# Patient Record
Sex: FEMALE | Race: WHITE | NOT HISPANIC OR LATINO | Employment: OTHER | URBAN - METROPOLITAN AREA
[De-identification: names, ages, dates, MRNs, and addresses within clinical notes are randomized per-mention and may not be internally consistent; named-entity substitution may affect disease eponyms.]

---

## 2019-01-16 ENCOUNTER — HOSPITAL ENCOUNTER (EMERGENCY)
Facility: HOSPITAL | Age: 24
Discharge: HOME | End: 2019-01-17
Attending: EMERGENCY MEDICINE | Admitting: EMERGENCY MEDICINE

## 2019-01-16 DIAGNOSIS — R10.31 RIGHT LOWER QUADRANT ABDOMINAL PAIN: Primary | ICD-10-CM

## 2019-01-16 LAB
ALBUMIN SERPL-MCNC: 4.4 G/DL (ref 3.5–5.2)
ALBUMIN/GLOB SERPL: 1.4 G/DL
ALP SERPL-CCNC: 56 U/L (ref 39–117)
ALT SERPL W P-5'-P-CCNC: 13 U/L (ref 1–33)
ANION GAP SERPL CALCULATED.3IONS-SCNC: 11.7 MMOL/L
AST SERPL-CCNC: 13 U/L (ref 1–32)
BASOPHILS # BLD AUTO: 0.02 10*3/MM3 (ref 0–0.2)
BASOPHILS NFR BLD AUTO: 0.3 % (ref 0–1.5)
BILIRUB SERPL-MCNC: 0.4 MG/DL (ref 0.1–1.2)
BUN BLD-MCNC: 12 MG/DL (ref 6–20)
BUN/CREAT SERPL: 16.7 (ref 7–25)
CALCIUM SPEC-SCNC: 9.7 MG/DL (ref 8.6–10.5)
CHLORIDE SERPL-SCNC: 102 MMOL/L (ref 98–107)
CO2 SERPL-SCNC: 27.3 MMOL/L (ref 22–29)
CREAT BLD-MCNC: 0.72 MG/DL (ref 0.57–1)
DEPRECATED RDW RBC AUTO: 47.3 FL (ref 37–54)
EOSINOPHIL # BLD AUTO: 0.19 10*3/MM3 (ref 0–0.7)
EOSINOPHIL NFR BLD AUTO: 2.5 % (ref 0.3–6.2)
ERYTHROCYTE [DISTWIDTH] IN BLOOD BY AUTOMATED COUNT: 13.1 % (ref 11.7–13)
GFR SERPL CREATININE-BSD FRML MDRD: 101 ML/MIN/1.73
GLOBULIN UR ELPH-MCNC: 3.1 GM/DL
GLUCOSE BLD-MCNC: 111 MG/DL (ref 65–99)
HCG SERPL QL: NEGATIVE
HCT VFR BLD AUTO: 38.6 % (ref 35.6–45.5)
HGB BLD-MCNC: 12.4 G/DL (ref 11.9–15.5)
IMM GRANULOCYTES # BLD AUTO: 0.02 10*3/MM3 (ref 0–0.03)
IMM GRANULOCYTES NFR BLD AUTO: 0.3 % (ref 0–0.5)
LIPASE SERPL-CCNC: 21 U/L (ref 13–60)
LYMPHOCYTES # BLD AUTO: 2.91 10*3/MM3 (ref 0.9–4.8)
LYMPHOCYTES NFR BLD AUTO: 38.4 % (ref 19.6–45.3)
MCH RBC QN AUTO: 31.5 PG (ref 26.9–32)
MCHC RBC AUTO-ENTMCNC: 32.1 G/DL (ref 32.4–36.3)
MCV RBC AUTO: 98 FL (ref 80.5–98.2)
MONOCYTES # BLD AUTO: 0.6 10*3/MM3 (ref 0.2–1.2)
MONOCYTES NFR BLD AUTO: 7.9 % (ref 5–12)
NEUTROPHILS # BLD AUTO: 3.83 10*3/MM3 (ref 1.9–8.1)
NEUTROPHILS NFR BLD AUTO: 50.6 % (ref 42.7–76)
NRBC BLD AUTO-RTO: 0 /100 WBC (ref 0–0)
PLATELET # BLD AUTO: 199 10*3/MM3 (ref 140–500)
PMV BLD AUTO: 10 FL (ref 6–12)
POTASSIUM BLD-SCNC: 3.5 MMOL/L (ref 3.5–5.2)
PROT SERPL-MCNC: 7.5 G/DL (ref 6–8.5)
RBC # BLD AUTO: 3.94 10*6/MM3 (ref 3.9–5.2)
SODIUM BLD-SCNC: 141 MMOL/L (ref 136–145)
WBC NRBC COR # BLD: 7.57 10*3/MM3 (ref 4.5–10.7)

## 2019-01-16 PROCEDURE — 96374 THER/PROPH/DIAG INJ IV PUSH: CPT

## 2019-01-16 PROCEDURE — 84703 CHORIONIC GONADOTROPIN ASSAY: CPT | Performed by: NURSE PRACTITIONER

## 2019-01-16 PROCEDURE — 85025 COMPLETE CBC W/AUTO DIFF WBC: CPT | Performed by: NURSE PRACTITIONER

## 2019-01-16 PROCEDURE — 25010000002 ONDANSETRON PER 1 MG: Performed by: PHYSICIAN ASSISTANT

## 2019-01-16 PROCEDURE — 96361 HYDRATE IV INFUSION ADD-ON: CPT

## 2019-01-16 PROCEDURE — 80053 COMPREHEN METABOLIC PANEL: CPT | Performed by: NURSE PRACTITIONER

## 2019-01-16 PROCEDURE — 83690 ASSAY OF LIPASE: CPT | Performed by: PHYSICIAN ASSISTANT

## 2019-01-16 PROCEDURE — 99284 EMERGENCY DEPT VISIT MOD MDM: CPT

## 2019-01-16 RX ORDER — ONDANSETRON 2 MG/ML
4 INJECTION INTRAMUSCULAR; INTRAVENOUS ONCE
Status: COMPLETED | OUTPATIENT
Start: 2019-01-16 | End: 2019-01-16

## 2019-01-16 RX ORDER — SODIUM CHLORIDE 0.9 % (FLUSH) 0.9 %
10 SYRINGE (ML) INJECTION AS NEEDED
Status: DISCONTINUED | OUTPATIENT
Start: 2019-01-16 | End: 2019-01-17 | Stop reason: HOSPADM

## 2019-01-16 RX ADMIN — SODIUM CHLORIDE 1000 ML: 9 INJECTION, SOLUTION INTRAVENOUS at 23:14

## 2019-01-16 RX ADMIN — ONDANSETRON 4 MG: 2 INJECTION INTRAMUSCULAR; INTRAVENOUS at 23:17

## 2019-01-17 ENCOUNTER — APPOINTMENT (OUTPATIENT)
Dept: ULTRASOUND IMAGING | Facility: HOSPITAL | Age: 24
End: 2019-01-17

## 2019-01-17 ENCOUNTER — APPOINTMENT (OUTPATIENT)
Dept: CT IMAGING | Facility: HOSPITAL | Age: 24
End: 2019-01-17

## 2019-01-17 VITALS
BODY MASS INDEX: 18.49 KG/M2 | WEIGHT: 111 LBS | DIASTOLIC BLOOD PRESSURE: 72 MMHG | RESPIRATION RATE: 18 BRPM | HEART RATE: 88 BPM | OXYGEN SATURATION: 99 % | SYSTOLIC BLOOD PRESSURE: 127 MMHG | TEMPERATURE: 98.3 F | HEIGHT: 65 IN

## 2019-01-17 LAB
BILIRUB UR QL STRIP: NEGATIVE
CLARITY UR: CLEAR
COLOR UR: YELLOW
GLUCOSE UR STRIP-MCNC: NEGATIVE MG/DL
HGB UR QL STRIP.AUTO: NEGATIVE
KETONES UR QL STRIP: NEGATIVE
LEUKOCYTE ESTERASE UR QL STRIP.AUTO: NEGATIVE
NITRITE UR QL STRIP: NEGATIVE
PH UR STRIP.AUTO: 7.5 [PH] (ref 5–8)
PROT UR QL STRIP: NEGATIVE
SP GR UR STRIP: <1.005 (ref 1–1.03)
UROBILINOGEN UR QL STRIP: ABNORMAL

## 2019-01-17 PROCEDURE — 93976 VASCULAR STUDY: CPT

## 2019-01-17 PROCEDURE — 25010000002 IOPAMIDOL 61 % SOLUTION: Performed by: PHYSICIAN ASSISTANT

## 2019-01-17 PROCEDURE — 81003 URINALYSIS AUTO W/O SCOPE: CPT | Performed by: NURSE PRACTITIONER

## 2019-01-17 PROCEDURE — 76830 TRANSVAGINAL US NON-OB: CPT

## 2019-01-17 PROCEDURE — 74177 CT ABD & PELVIS W/CONTRAST: CPT

## 2019-01-17 RX ORDER — ONDANSETRON 4 MG/1
4 TABLET, ORALLY DISINTEGRATING ORAL EVERY 8 HOURS PRN
Qty: 12 TABLET | Refills: 0 | OUTPATIENT
Start: 2019-01-17 | End: 2019-12-31

## 2019-01-17 RX ADMIN — IOPAMIDOL 85 ML: 612 INJECTION, SOLUTION INTRAVENOUS at 00:35

## 2019-01-17 NOTE — ED PROVIDER NOTES
EMERGENCY DEPARTMENT ENCOUNTER    CHIEF COMPLAINT  Chief Complaint: Abdominal pain  History given by: patient   History limited by: n/a  Room Number: 26/26  PMD: System, Provider Not In      HPI:  Pt is a 23 y.o. female who presents complaining of abd pain that have been ongoing for the past 2 days. Pt states that initially, the pain was generalized, but it has since localized to the RLQ. Pt states that the pain is exacerbated by movement. She also reports nausea, but denies vomiting, fever, chills, vaginal discharge, or urinary sx. Pt states that she was seen at an [facility] [place], [place] and referred to the ED for further evaluation.    Duration:  2 days   Onset: gradual  Timing: constant   Location: RLQ  Radiation: none  Quality: pain  Intensity/Severity: moderate  Progression: unchanged   Associated Symptoms: nausea  Aggravating Factors: movement   Alleviating Factors: none    PAST MEDICAL HISTORY  Active Ambulatory Problems     Diagnosis Date Noted   • No Active Ambulatory Problems     Resolved Ambulatory Problems     Diagnosis Date Noted   • No Resolved Ambulatory Problems     Past Medical History:   Diagnosis Date   • Gastric polyp    • GERD (gastroesophageal reflux disease)        PAST SURGICAL HISTORY  Past Surgical History:   Procedure Laterality Date   • ENDOSCOPY  02/10/2015    gastric polyp       FAMILY HISTORY  History reviewed. No pertinent family history.    SOCIAL HISTORY  Social History     Socioeconomic History   • Marital status: Single     Spouse name: Not on file   • Number of children: Not on file   • Years of education: Not on file   • Highest education level: Not on file   Social Needs   • Financial resource strain: Not on file   • Food insecurity - worry: Not on file   • Food insecurity - inability: Not on file   • Transportation needs - medical: Not on file   • Transportation needs - non-medical: Not on file   Occupational History   • Not on file   Tobacco Use   • Smoking status: Never Smoker   • Smokeless  tobacco: Never Used   Substance and Sexual Activity   • Alcohol use: No   • Drug use: No   • Sexual activity: Defer   Other Topics Concern   • Not on file   Social History Narrative   • Not on file       ALLERGIES  Patient has no known allergies.    REVIEW OF SYSTEMS  Review of Systems   Constitutional: Negative for fever.   HENT: Negative for sore throat.    Eyes: Negative.    Respiratory: Negative for cough and shortness of breath.    Cardiovascular: Negative for chest pain.   Gastrointestinal: Positive for abdominal pain and nausea. Negative for diarrhea and vomiting.   Genitourinary: Negative for dysuria.   Musculoskeletal: Negative for neck pain.   Skin: Negative for rash.   Neurological: Negative for weakness, numbness and headaches.   Hematological: Negative.    Psychiatric/Behavioral: Negative.    All other systems reviewed and are negative.      PHYSICAL EXAM  ED Triage Vitals [01/16/19 2226]   Temp Heart Rate Resp BP SpO2   98.3 °F (36.8 °C) (!) 121 16 -- 100 %      Temp src Heart Rate Source Patient Position BP Location FiO2 (%)   Tympanic Monitor -- -- --       Physical Exam   Constitutional: She is oriented to person, place, and time. No distress.   HENT:   Head: Normocephalic and atraumatic.   Eyes: EOM are normal. Pupils are equal, round, and reactive to light.   Neck: Normal range of motion. Neck supple.   Cardiovascular: Normal rate, regular rhythm and normal heart sounds.   Pulmonary/Chest: Effort normal and breath sounds normal. No respiratory distress.   Abdominal: Soft. Bowel sounds are hypoactive. There is tenderness in the right lower quadrant. There is no rebound and no guarding.   Musculoskeletal: Normal range of motion. She exhibits no edema.   Neurological: She is alert and oriented to person, place, and time. She has normal sensation and normal strength.   Skin: Skin is warm and dry. No rash noted.   Psychiatric: Mood and affect normal.   Nursing note and vitals reviewed.      LAB  RESULTS  Lab Results (last 24 hours)     Procedure Component Value Units Date/Time    POC Urinalysis Dipstick, Multipro (Automated dipstick) [[id number]]  (Abnormal) Collected:  01/16/19 1928    Specimen:  Urine Updated:  01/16/19 1929     Color Dark Yellow     Clarity, UA Cloudy     Glucose, UA Negative mg/dL      Bilirubin Negative     Ketones, UA Negative     Specific Gravity  1.030     Blood, UA Trace     pH, Urine 6.5     Protein, POC Negative mg/dL      Urobilinogen, UA Normal     Nitrite, UA Negative     Leukocytes Trace    POCT Pregnancy, urine [[id number]]  (Normal) Collected:  01/16/19 1929    Specimen:  Urine Updated:  01/16/19 1930     HCG, Urine, QL Negative     Lot Number [id number]     Internal Positive Control Positive     Internal Negative Control Negative    CBC & Differential [[id number]] Collected:  01/16/19 2307    Specimen:  Blood Updated:  01/16/19 2322    Narrative:       The following orders were created for panel order CBC & Differential.  Procedure                               Abnormality         Status                     ---------                               -----------         ------                     CBC Auto Differential[[id number]]         Abnormal            Final result                 Please view results for these tests on the individual orders.    Comprehensive Metabolic Panel [[id number]]  (Abnormal) Collected:  01/16/19 2307    Specimen:  Blood Updated:  01/16/19 2339     Glucose 111 mg/dL      BUN 12 mg/dL      Creatinine 0.72 mg/dL      Sodium 141 mmol/L      Potassium 3.5 mmol/L      Chloride 102 mmol/L      CO2 27.3 mmol/L      Calcium 9.7 mg/dL      Total Protein 7.5 g/dL      Albumin 4.40 g/dL      ALT (SGPT) 13 U/L      AST (SGOT) 13 U/L      Alkaline Phosphatase 56 U/L      Total Bilirubin 0.4 mg/dL      eGFR Non African Amer 101 mL/min/1.73      Globulin 3.1 gm/dL      A/G Ratio 1.4 g/dL      BUN/Creatinine Ratio 16.7     Anion Gap 11.7 mmol/L     hCG, Serum, Qualitative  [[id number]]  (Normal) Collected:  01/16/19 2307    Specimen:  Blood Updated:  01/16/19 2340     HCG Qualitative Negative    CBC Auto Differential [[id number]]  (Abnormal) Collected:  01/16/19 2307    Specimen:  Blood Updated:  01/16/19 2322     WBC 7.57 10*3/mm3      RBC 3.94 10*6/mm3      Hemoglobin 12.4 g/dL      Hematocrit 38.6 %      MCV 98.0 fL      MCH 31.5 pg      MCHC 32.1 g/dL      RDW 13.1 %      RDW-SD 47.3 fl      MPV 10.0 fL      Platelets 199 10*3/mm3      Neutrophil % 50.6 %      Lymphocyte % 38.4 %      Monocyte % 7.9 %      Eosinophil % 2.5 %      Basophil % 0.3 %      Immature Grans % 0.3 %      Neutrophils, Absolute 3.83 10*3/mm3      Lymphocytes, Absolute 2.91 10*3/mm3      Monocytes, Absolute 0.60 10*3/mm3      Eosinophils, Absolute 0.19 10*3/mm3      Basophils, Absolute 0.02 10*3/mm3      Immature Grans, Absolute 0.02 10*3/mm3      nRBC 0.0 /100 WBC     Lipase [[id number]]  (Normal) Collected:  01/16/19 2307    Specimen:  Blood Updated:  01/16/19 2339     Lipase 21 U/L     Urinalysis With Microscopic If Indicated (No Culture) - Urine, Clean Catch [[id number]]  (Abnormal) Collected:  01/17/19 0017    Specimen:  Urine, Clean Catch Updated:  01/17/19 0027     Color, UA Yellow     Appearance, UA Clear     pH, UA 7.5     Specific Gravity, UA <1.005     Glucose, UA Negative     Ketones, UA Negative     Bilirubin, UA Negative     Blood, UA Negative     Protein, UA Negative     Leuk Esterase, UA Negative     Nitrite, UA Negative     Urobilinogen, UA 0.2 E.U./dL    Narrative:       Urine microscopic not indicated.          I ordered the above labs and reviewed the results    RADIOLOGY  US Non-ob Transvaginal   Final Result   Normal pelvic sonogram       This report was finalized on 1/17/2019 2:20 AM by Srinath Aviles M.D.          CT Abdomen Pelvis With Contrast   Final Result   IMPRESSION :    1. No acute inflammation or abnormal enhancement                           This report was finalized on 1/17/2019  1:01 AM by Srinath Aviles M.D.          US Testicular or Ovarian Vascular Limited    (Results Pending)        I ordered the above noted radiological studies. Interpreted by radiologist. Reviewed by me in PACS.       PROCEDURES  Procedures      PROGRESS AND CONSULTS       22:31  CMP, CBC, hcg, and UA ordered.     22:47  HR- (!) 121 Temp- 98.3 °F (36.8 °C) (Tympanic) O2 sat- 100%  Informed pt of the plan for labs and CT abd/pelvis. Pt understands and agrees with the plan, all questions answered.    22:53  Lipase and CT abd/pelvis ordered. IVF ordered for hydration. Zofran ordered to treat nausea.     01:00  Discussed pt with Dr. Gibbs who agrees with plan of care.     01:12  U/S pelvis ordered.     01:17  BP- 121/79 HR- 96 Temp- 98.3 °F (36.8 °C) (Tympanic) O2 sat- 100%  Rechecked the patient who is in NAD and is resting comfortably. Informed pt that the labs and CT abd/pelvis show NAD, plan for U/S pelvis. Pt understands and agrees with the plan, all questions answered.     02:32  BP- 122/75 HR- 96 Temp- 98.3 °F (36.8 °C) (Tympanic) O2 sat- 99%  Rechecked the patient who is in NAD and is resting comfortably. Informed pt that the U/S shows NAD. Informed her of the plan for d/c. Pt understands and agrees with the plan, all questions answered.    MEDICAL DECISION MAKING  Results were reviewed/discussed with the patient and they were also made aware of online access. Pt also made aware that some labs, such as cultures, will not be resulted during ER visit and follow up with PMD is necessary.     MDM  Number of Diagnoses or Management Options     Amount and/or Complexity of Data Reviewed  Clinical lab tests: ordered and reviewed (WBC=7.57)  Tests in the radiology section of CPT®: ordered and reviewed (CT abd/pelvis shows NAD, U/S pelvis shows NAD)    Patient Progress  Patient progress: stable         DIAGNOSIS  Final diagnoses:   Right lower quadrant abdominal pain       DISPOSITION  DISCHARGE    Patient discharged in  stable condition.    Reviewed implications of results, diagnosis, meds, responsibility to follow up, warning signs and symptoms of possible worsening, potential complications and reasons to return to ER.    Patient/Family voiced understanding of above instructions.    Discussed plan for discharge, as there is no emergent indication for admission. Patient referred to primary care provider for BP management due to today's BP. Pt/family is agreeable and understands need for follow up and repeat testing.  Pt is aware that discharge does not mean that nothing is wrong but it indicates no emergency is present that requires admission and they must continue care with follow-up as given below or physician of their choice.     FOLLOW-UP  PATIENT LIAISON [facility] [address]  [phone]  Schedule an appointment as soon as possible for a visit in 1 week           Medication List      New Prescriptions    ondansetron ODT 4 MG disintegrating tablet  Commonly known as:  ZOFRAN ODT  Take 1 tablet by mouth Every 8 (Eight) Hours As Needed for Nausea.          Latest Documented Vital Signs:  As of 3:01 AM  BP- 127/72 HR- 88 Temp- 98.3 °F (36.8 °C) (Tympanic) O2 sat- 99%    --  Documentation assistance provided by luann Dempsey for Long Michelle PA-C.  Information recorded by the scribe was done at my direction and has been verified and validated by me.       Cheri Dempsey  01/17/19 0232       Long Michelle PA  01/17/19 3135

## 2019-01-17 NOTE — ED NOTES
Pt sent from [facility] for right lower abdomen pain. Pain beginning Monday morning. Pt describes the pain as constant, worse with movement, and rated 6/10 at this time. +Nausea      Liliane Pang RN  01/16/19 6810

## 2019-01-17 NOTE — ED PROVIDER NOTES
MD ATTESTATION NOTE    The CARTER and I have discussed this patient's history, physical exam, and treatment plan.  I have reviewed the documentation and personally had a face to face interaction with the patient. I affirm the documentation and agree with the treatment and plan.  The attached note describes my personal findings.    Pt with generalized abd pain that began 2 days ago and has localized to the RLQ. She reports some nausea, but denies any vomiting. No  sxs.    GENERAL: not distressed  HENT: nares patent  EYES: no scleral icterus  RESPIRATORY: normal effort  ABDOMEN: soft, RLQ tenderness. No rebound or guarding.   MUSCULOSKELETAL: no deformity  NEURO: alert, CRUZ, FC  SKIN: warm, dry    Vital signs and nursing notes reviewed.    Blood work is unremarkable. CT abd/pel negative. Will get pelvic US.    Documentation assistance provided by luann Álvarez for Dr. Gibbs.  Information recorded by the scribe was done at my direction and has been verified and validated by me.         Robert Álvarez  01/17/19 0109       Robert Álvarez  01/17/19 0111       Ant Gibbs II, MD  01/17/19 0157

## 2019-01-17 NOTE — DISCHARGE INSTRUCTIONS
Home, rest, medicine as directed, tylenol or ibuprofen as needed, keep well hydrated, home medicine as prescribed, follow up with PCP for recheck. Return to care if symptoms worsen or with further concerns.

## 2019-03-01 ENCOUNTER — OFFICE VISIT (OUTPATIENT)
Dept: FAMILY MEDICINE CLINIC | Facility: CLINIC | Age: 24
End: 2019-03-01

## 2019-03-01 VITALS
RESPIRATION RATE: 20 BRPM | DIASTOLIC BLOOD PRESSURE: 50 MMHG | HEART RATE: 115 BPM | SYSTOLIC BLOOD PRESSURE: 98 MMHG | OXYGEN SATURATION: 98 % | BODY MASS INDEX: 17.65 KG/M2 | WEIGHT: 105.9 LBS | HEIGHT: 65 IN | TEMPERATURE: 98 F

## 2019-03-01 DIAGNOSIS — Z00.00 ANNUAL PHYSICAL EXAM: Primary | ICD-10-CM

## 2019-03-01 DIAGNOSIS — R63.6 UNDERWEIGHT: ICD-10-CM

## 2019-03-01 DIAGNOSIS — R00.0 TACHYCARDIA: ICD-10-CM

## 2019-03-01 DIAGNOSIS — R09.81 CONGESTION OF NASAL SINUS: ICD-10-CM

## 2019-03-01 PROCEDURE — 99395 PREV VISIT EST AGE 18-39: CPT | Performed by: FAMILY MEDICINE

## 2019-03-01 PROCEDURE — 93000 ELECTROCARDIOGRAM COMPLETE: CPT | Performed by: FAMILY MEDICINE

## 2019-03-01 NOTE — PROGRESS NOTES
Chief Complaint   Patient presents with   • Establish Care     NP to [facility]   • Sinus Problem     drainage, stuffy then runny nose, ear feel clogged since this am   Previously seen by Dr. Alas. Pt is new to me, problems are new to me.      Subjective   Letitia Hernandez is a 23 y.o. female.     History of Present Illness   Acute illness  Sore throat, bad headache, nasal congestion. She took airborne, slept some more. Mainly her head is killing her and her ears keep popping. Also having running nose now. Throat feels swollen.  Started today.     Follows with Dr. Bains  She is feeling better. She is taking the pantoprazole daily but just ran out and substituting with probiotic culturelle and seems to be helping. She has been doing this for about 1.5 months.   Wakes up with her stomach feeling hollow and irritated, food can help and other times she has vomiting. Vomiting all day. Started about 5 years ago. Last time this happened was 3-4 months ago. Happens about 2-3 times per year.     Tachycardia  Noted over the last 1.5 years she has had a few times where she has darkening of her vision, ringing in the ears, and feels dizzy.  She says sometimes her skin is pale and she has blue lips when this happens.  Seems like cold water on her neck or face helps.  Has not happened recently.  She also has a habit of going 2-3 days sometimes with poor appetite and only having intermittent snacks.  For the most part she has breakfast at , and then prepares her meals during the evening hours for lunch and dinner.  She has about 3 or 4, 12 ounce glasses of water per day.    Sees gynecology   Dr. Ro Mejia  She is physically active more so on the more months.  She is a runner at that time outside.  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  Has been taking birth control from age 18 age 22.  She did get out of a long-term relationship recently and when she tried to resume the  "birth control she had breakout over her chest area with acne.  She decided no birth control.  Now she uses condoms when she has intercourse.    The following portions of the patient's history were reviewed and updated as appropriate: allergies, current medications, past family history, past medical history, past social history, past surgical history and problem list.    Review of Systems   Constitutional: Negative for activity change, appetite change and unexpected weight change.   HENT: Positive for congestion.    Respiratory: Negative for shortness of breath.    Cardiovascular: Negative for chest pain and leg swelling.   Gastrointestinal: Negative for blood in stool, constipation and diarrhea.   Neurological: Positive for headaches.       Vitals:    03/01/19 1404   BP: 98/50   BP Location: Left arm   Patient Position: Sitting   Cuff Size: Adult   Pulse: 115   Resp: 20   Temp: 98 °F (36.7 °C)   TempSrc: Oral   SpO2: 98%   Weight: 48 kg (105 lb 14.4 oz)   Height: 165.1 cm (65\")       Objective   Physical Exam   Constitutional: She is oriented to person, place, and time. She appears well-nourished. No distress.   HENT:   Right Ear: External ear normal.   Left Ear: External ear normal.   Nose: Nose normal.   Mouth/Throat: Oropharynx is clear and moist. No oropharyngeal exudate.   Bilateral ear canals and tympanic membranes appear normal.   Eyes: Conjunctivae and EOM are normal. Right eye exhibits no discharge. Left eye exhibits no discharge. No scleral icterus.   Neck: Neck supple. No thyromegaly present.   Cardiovascular: Normal rate, regular rhythm, normal heart sounds and intact distal pulses. Exam reveals no gallop and no friction rub.   No murmur heard.  Pulmonary/Chest: Effort normal and breath sounds normal. No respiratory distress. She has no wheezes. She has no rales. She exhibits no tenderness.   Abdominal: Soft. Bowel sounds are normal. She exhibits no distension and no mass. There is no tenderness. There " is no guarding.   Musculoskeletal: She exhibits no edema or deformity.   Lymphadenopathy:     She has no cervical adenopathy.   Neurological: She is alert and oriented to person, place, and time. She exhibits normal muscle tone. Coordination normal.   Skin: Skin is warm and dry.   Psychiatric: She has a normal mood and affect. Her behavior is normal.   Vitals reviewed.      Assessment/Plan   Letitia was seen today for establish care and sinus problem.    Diagnoses and all orders for this visit:    Annual physical exam  We discussed her nutrition status.  Her BMI is below normal.  Patient reports she is always been on the smaller side.  However her eating habits and symptoms sounding a bit like hypotension because concerns of her inadequate nutrition.  We discussed fluid intake and regular meals.  I encouraged her to increase her fluid consumption to take 6 of her 12 ounce glasses per day and on days with poor appetite that she really concentrate on eating frequent small snacks if she is unable to eat a meal.  Going to have her follow-up in a few months to check in on her weight and how she is doing with her nutrition.  Congestion of nasal sinus  Likely viral.  Her exam is normal.  She was given some Zyrtec and counseled on using stay hist or Mucinex for her rhinorrhea and congestion respectively.  She can also alternate Tylenol and Advil for her headache.  Encouraged her to stay well-hydrated with cold and warm fluids.  Tachycardia  -     ECG 12 Lead  Normal on EKG.  Was some concern given her symptoms with episodes sounding like presyncopal in nature that there was some potential for arrhythmia like Summer-Parkinson-White.  I think is more likely related to her nutrition status.  Underweight  Close follow-up.  Counseled heavily on her nutrition status and recommendations for her diet and fluid consumption.

## 2019-03-01 NOTE — PROGRESS NOTES
Procedure     ECG 12 Lead  Date/Time: 3/1/2019 3:06 PM  Performed by: Karyna Mcneal MD  Authorized by: Karyna Mcneal MD   Comparison: not compared with previous ECG   Rhythm: sinus rhythm  Rate: normal  ST Segments: ST segments normal  T Waves: T waves normal  QRS axis: normal  Other: no other findings    Clinical impression: normal ECG

## 2019-04-18 ENCOUNTER — TELEPHONE (OUTPATIENT)
Dept: FAMILY MEDICINE CLINIC | Facility: CLINIC | Age: 24
End: 2019-04-18

## 2019-04-18 NOTE — TELEPHONE ENCOUNTER
Pt called about her weight and wanted to know if you could recommend a nutritionist for her to go to?

## 2019-04-30 ENCOUNTER — TELEPHONE (OUTPATIENT)
Dept: FAMILY MEDICINE CLINIC | Facility: CLINIC | Age: 24
End: 2019-04-30

## 2019-05-02 NOTE — TELEPHONE ENCOUNTER
I put in a referral on 4/18/19. I can't tell what has been done toward this. I would recommend checking with Samantha. Thanks.

## 2019-05-02 NOTE — TELEPHONE ENCOUNTER
Per Samantha she mailed patient list of all nutrionist that she had, pt has to schedule her own appt, insurance doesn't pay.

## 2019-10-24 ENCOUNTER — TELEPHONE (OUTPATIENT)
Dept: CARDIOLOGY | Facility: CLINIC | Age: 24
End: 2019-10-24

## 2019-10-24 NOTE — TELEPHONE ENCOUNTER
Patient's mother called today to report that her daughter was at a health fair where [facility]. She had an EKG done and Linda Matthews is the one that did it and referred her to you due to her having an arrhythmia that needs treating. Mother did not say what type of arrhythmia was.    When would you like me to get her in for a new patient appt ? She can't tomorrow because she works. So anytime next week would be fine.    Your next available new patient appt is 11/6.    Let me know what you would like me to do ?    Thanks  Suzette Muse RN

## 2019-10-25 NOTE — TELEPHONE ENCOUNTER
Appointment spot has been held and I just called the mother and left a voice mail to call the office for appt time. Thanks

## 2019-10-28 ENCOUNTER — OFFICE VISIT (OUTPATIENT)
Dept: CARDIOLOGY | Facility: CLINIC | Age: 24
End: 2019-10-28

## 2019-10-28 VITALS
WEIGHT: 109 LBS | DIASTOLIC BLOOD PRESSURE: 68 MMHG | HEIGHT: 65 IN | BODY MASS INDEX: 18.16 KG/M2 | SYSTOLIC BLOOD PRESSURE: 102 MMHG | HEART RATE: 97 BPM

## 2019-10-28 DIAGNOSIS — R55 NEAR SYNCOPE: Primary | ICD-10-CM

## 2019-10-28 PROCEDURE — 99204 OFFICE O/P NEW MOD 45 MIN: CPT | Performed by: INTERNAL MEDICINE

## 2019-10-28 PROCEDURE — 93000 ELECTROCARDIOGRAM COMPLETE: CPT | Performed by: INTERNAL MEDICINE

## 2019-10-28 NOTE — PROGRESS NOTES
Subjective:     Encounter Date:10/28/2019      Patient ID: Letitia Hernandez is a 24 y.o. female.    Chief Complaint: Near syncope.  History of Present Illness    24-year-old female who presents today for evaluation.  Patient was at a health fair and underwent rhythm screening.  Patient had a T wave inversion as well as a merged P wave with a QRS.  She occasionally has some shortness of breath both at rest or with exertion but more over she is had some intermittent episodes of near syncope for the past 4 years.  Patient says that she will start getting tunnel vision one time she was looking in her mirror and her lips turned blue.  She says she usually puts her head under a cold faucet which can stop the event.  Afterwards she says it takes about 20 to 30 minutes to recover.  She says it happens about 2 or 3 times a year it happens very randomly and is not associated with anything in specific.    Review of Systems   Constitution: Positive for malaise/fatigue.   Eyes: Positive for blurred vision.   Respiratory: Positive for shortness of breath.    Neurological: Positive for dizziness.   Psychiatric/Behavioral: The patient is nervous/anxious.          ECG 12 Lead  Date/Time: 10/28/2019 9:51 AM  Performed by: Migue Matthews MD  Authorized by: Migue Matthews MD   Comparison: compared with previous ECG from 3/1/2019  Similar to previous ECG  Rhythm: sinus rhythm    Clinical impression: normal ECG               Objective:     Physical Exam   Constitutional: She is oriented to person, place, and time. She appears well-developed.   HENT:   Head: Normocephalic.   Eyes: Conjunctivae are normal.   Neck: Normal range of motion.   Cardiovascular: Normal rate, regular rhythm and normal heart sounds.   Pulmonary/Chest: Breath sounds normal.   Abdominal: Soft. Bowel sounds are normal.   Musculoskeletal: Normal range of motion. She exhibits no edema.   Neurological: She is alert and oriented to person, place, and time.    Skin: Skin is warm and dry.   Psychiatric: She has a normal mood and affect. Her behavior is normal.   Vitals reviewed.      Lab Review:       Assessment:          Diagnosis Plan   1. Near syncope  Adult Transthoracic Echo Complete W/ Cont if Necessary Per Protocol          Plan:       1.  Recurrent syncope/near syncope.  It interesting one time she said she completely lost vision during wise episodes and her friend had to guide her outside to cool air and she subsequently recovered.  She really does not describe her heart racing extensively she says that it has occurred in groups from time to time.  Her ECG is not really remarkable I do not see a delta wave.  Her symptoms are breaking as if that was an SVT but she does not describe fast rate.  I will start with an echocardiogram to rule out structural heart disease.  If her echo is unremarkable then I told her to contact my office when she has another event give her a 30-day monitor to see if we can capture any types of arrhythmias.  Also patient has a prodrome so we do not need to limit any activities at this time.

## 2019-11-07 ENCOUNTER — HOSPITAL ENCOUNTER (OUTPATIENT)
Dept: CARDIOLOGY | Facility: HOSPITAL | Age: 24
Discharge: HOME | End: 2019-11-07
Admitting: INTERNAL MEDICINE

## 2019-11-07 VITALS
DIASTOLIC BLOOD PRESSURE: 52 MMHG | HEART RATE: 92 BPM | BODY MASS INDEX: 18.16 KG/M2 | WEIGHT: 109 LBS | HEIGHT: 65 IN | SYSTOLIC BLOOD PRESSURE: 90 MMHG

## 2019-11-07 DIAGNOSIS — R55 NEAR SYNCOPE: ICD-10-CM

## 2019-11-07 PROCEDURE — 93306 TTE W/DOPPLER COMPLETE: CPT

## 2019-11-07 PROCEDURE — 93306 TTE W/DOPPLER COMPLETE: CPT | Performed by: INTERNAL MEDICINE

## 2019-11-07 PROCEDURE — 25010000002 PERFLUTREN (DEFINITY) 8.476 MG IN SODIUM CHLORIDE 0.9 % 10 ML INJECTION: Performed by: INTERNAL MEDICINE

## 2019-11-07 RX ADMIN — PERFLUTREN 1.5 ML: 6.52 INJECTION, SUSPENSION INTRAVENOUS at 09:35

## 2019-11-08 LAB
AORTIC ARCH: 1.9 CM
AORTIC ROOT ANNULUS: 1.4 CM
ASCENDING AORTA: 1.9 CM
BH CV ECHO MEAS - ACS: 1.5 CM
BH CV ECHO MEAS - AO MAX PG (FULL): 5.8 MMHG
BH CV ECHO MEAS - AO MAX PG: 8.8 MMHG
BH CV ECHO MEAS - AO MEAN PG (FULL): 2.9 MMHG
BH CV ECHO MEAS - AO MEAN PG: 4.9 MMHG
BH CV ECHO MEAS - AO V2 MAX: 148 CM/SEC
BH CV ECHO MEAS - AO V2 MEAN: 106.7 CM/SEC
BH CV ECHO MEAS - AO V2 VTI: 32.4 CM
BH CV ECHO MEAS - ASC AORTA: 1.9 CM
BH CV ECHO MEAS - AVA(I,A): 1.2 CM^2
BH CV ECHO MEAS - AVA(I,D): 1.2 CM^2
BH CV ECHO MEAS - AVA(V,A): 1.2 CM^2
BH CV ECHO MEAS - AVA(V,D): 1.2 CM^2
BH CV ECHO MEAS - BSA(HAYCOCK): 1.5 M^2
BH CV ECHO MEAS - BSA: 1.5 M^2
BH CV ECHO MEAS - BZI_BMI: 18.1 KILOGRAMS/M^2
BH CV ECHO MEAS - BZI_METRIC_HEIGHT: 165.1 CM
BH CV ECHO MEAS - BZI_METRIC_WEIGHT: 49.4 KG
BH CV ECHO MEAS - EDV(MOD-SP2): 58 ML
BH CV ECHO MEAS - EDV(MOD-SP4): 51 ML
BH CV ECHO MEAS - EDV(TEICH): 57.5 ML
BH CV ECHO MEAS - EF(CUBED): 73.6 %
BH CV ECHO MEAS - EF(MOD-BP): 56 %
BH CV ECHO MEAS - EF(MOD-SP2): 55.2 %
BH CV ECHO MEAS - EF(MOD-SP4): 54.9 %
BH CV ECHO MEAS - EF(TEICH): 66.3 %
BH CV ECHO MEAS - ESV(MOD-SP2): 26 ML
BH CV ECHO MEAS - ESV(MOD-SP4): 23 ML
BH CV ECHO MEAS - ESV(TEICH): 19.4 ML
BH CV ECHO MEAS - FS: 35.9 %
BH CV ECHO MEAS - IVS/LVPW: 1.1
BH CV ECHO MEAS - IVSD: 0.98 CM
BH CV ECHO MEAS - LAT PEAK E' VEL: 22 CM/SEC
BH CV ECHO MEAS - LV DIASTOLIC VOL/BSA (35-75): 33.4 ML/M^2
BH CV ECHO MEAS - LV MASS(C)D: 104.9 GRAMS
BH CV ECHO MEAS - LV MASS(C)DI: 68.6 GRAMS/M^2
BH CV ECHO MEAS - LV MAX PG: 3 MMHG
BH CV ECHO MEAS - LV MEAN PG: 2 MMHG
BH CV ECHO MEAS - LV SYSTOLIC VOL/BSA (12-30): 15 ML/M^2
BH CV ECHO MEAS - LV V1 MAX: 86.5 CM/SEC
BH CV ECHO MEAS - LV V1 MEAN: 68.6 CM/SEC
BH CV ECHO MEAS - LV V1 VTI: 17.9 CM
BH CV ECHO MEAS - LVIDD: 3.7 CM
BH CV ECHO MEAS - LVIDS: 2.4 CM
BH CV ECHO MEAS - LVLD AP2: 6.8 CM
BH CV ECHO MEAS - LVLD AP4: 6.9 CM
BH CV ECHO MEAS - LVLS AP2: 5.6 CM
BH CV ECHO MEAS - LVLS AP4: 6 CM
BH CV ECHO MEAS - LVOT AREA (M): 2 CM^2
BH CV ECHO MEAS - LVOT AREA: 2.1 CM^2
BH CV ECHO MEAS - LVOT DIAM: 1.6 CM
BH CV ECHO MEAS - LVPWD: 0.93 CM
BH CV ECHO MEAS - MED PEAK E' VEL: 13 CM/SEC
BH CV ECHO MEAS - MV A DUR: 0.1 SEC
BH CV ECHO MEAS - MV A MAX VEL: 47.6 CM/SEC
BH CV ECHO MEAS - MV DEC SLOPE: 482.2 CM/SEC^2
BH CV ECHO MEAS - MV DEC TIME: 0.18 SEC
BH CV ECHO MEAS - MV E MAX VEL: 91.7 CM/SEC
BH CV ECHO MEAS - MV E/A: 1.9
BH CV ECHO MEAS - MV MAX PG: 5.7 MMHG
BH CV ECHO MEAS - MV MEAN PG: 1.9 MMHG
BH CV ECHO MEAS - MV P1/2T MAX VEL: 92.5 CM/SEC
BH CV ECHO MEAS - MV P1/2T: 56.2 MSEC
BH CV ECHO MEAS - MV V2 MAX: 119.4 CM/SEC
BH CV ECHO MEAS - MV V2 MEAN: 64.6 CM/SEC
BH CV ECHO MEAS - MV V2 VTI: 31.4 CM
BH CV ECHO MEAS - MVA P1/2T LCG: 2.4 CM^2
BH CV ECHO MEAS - MVA(P1/2T): 3.9 CM^2
BH CV ECHO MEAS - MVA(VTI): 1.2 CM^2
BH CV ECHO MEAS - PA ACC TIME: 0.16 SEC
BH CV ECHO MEAS - PA MAX PG (FULL): 2.2 MMHG
BH CV ECHO MEAS - PA MAX PG: 5 MMHG
BH CV ECHO MEAS - PA PR(ACCEL): 6.5 MMHG
BH CV ECHO MEAS - PA V2 MAX: 111.3 CM/SEC
BH CV ECHO MEAS - PULM A REVS DUR: 0.09 SEC
BH CV ECHO MEAS - PULM A REVS VEL: 21.9 CM/SEC
BH CV ECHO MEAS - PULM DIAS VEL: 53.1 CM/SEC
BH CV ECHO MEAS - PULM S/D: 0.98
BH CV ECHO MEAS - PULM SYS VEL: 52.3 CM/SEC
BH CV ECHO MEAS - PVA(V,A): 1.2 CM^2
BH CV ECHO MEAS - PVA(V,D): 1.2 CM^2
BH CV ECHO MEAS - QP/QS: 0.82
BH CV ECHO MEAS - RAP SYSTOLE: 8 MMHG
BH CV ECHO MEAS - RV MAX PG: 2.7 MMHG
BH CV ECHO MEAS - RV MEAN PG: 1.6 MMHG
BH CV ECHO MEAS - RV V1 MAX: 82.3 CM/SEC
BH CV ECHO MEAS - RV V1 MEAN: 60.5 CM/SEC
BH CV ECHO MEAS - RV V1 VTI: 19.2 CM
BH CV ECHO MEAS - RVOT AREA: 1.6 CM^2
BH CV ECHO MEAS - RVOT DIAM: 1.4 CM
BH CV ECHO MEAS - SI(CUBED): 24.1 ML/M^2
BH CV ECHO MEAS - SI(LVOT): 24.6 ML/M^2
BH CV ECHO MEAS - SI(MOD-SP2): 20.9 ML/M^2
BH CV ECHO MEAS - SI(MOD-SP4): 18.3 ML/M^2
BH CV ECHO MEAS - SI(TEICH): 25 ML/M^2
BH CV ECHO MEAS - SUP REN AO DIAM: 1.3 CM
BH CV ECHO MEAS - SV(CUBED): 36.8 ML
BH CV ECHO MEAS - SV(LVOT): 37.5 ML
BH CV ECHO MEAS - SV(MOD-SP2): 32 ML
BH CV ECHO MEAS - SV(MOD-SP4): 28 ML
BH CV ECHO MEAS - SV(RVOT): 30.7 ML
BH CV ECHO MEAS - SV(TEICH): 38.2 ML
BH CV ECHO MEAS - TAPSE (>1.6): 2.1 CM2
BH CV ECHO MEASUREMENTS AVERAGE E/E' RATIO: 5.24
BH CV XLRA - RV BASE: 2.3 CM
BH CV XLRA - RV LENGTH: 5.9 CM
BH CV XLRA - RV MID: 2.4 CM
BH CV XLRA - TDI S': 15 CM/SEC
LEFT ATRIUM VOLUME INDEX: 13 ML/M2
MAXIMAL PREDICTED HEART RATE: 197 BPM
SINUS: 2.2 CM
STJ: 2.1 CM
STRESS TARGET HR: 167 BPM

## 2019-11-11 ENCOUNTER — TELEPHONE (OUTPATIENT)
Dept: CARDIOLOGY | Facility: CLINIC | Age: 24
End: 2019-11-11

## 2019-11-11 NOTE — TELEPHONE ENCOUNTER
"Pt left msg for ECHO results from 11/07/19.    [phone] <--Letitia        Interpretation Summary     · Left ventricular systolic function is normal.  · Left ventricular systolic function is normal. Calculated EF = 56%. Estimated EF was in agreement with the calculated EF. Normal left ventricular cavity size and wall thickness noted. All left ventricular wall segments contract normally.  · Left ventricular diastolic function is normal.  · No significant valvular stenosis or insufficiency noted.      Patient Hx Of Height, Weight, and Vitals     Height Weight BSA (Calculated - sq m) BMI (kg/m2) Pulse BP   165.1 cm (65\") 49.4 kg (109 lb) 1.53 sq meters 18.18 92 90/52   Reason For Exam     Syncope   Dx: Near syncope [R55 (ICD-10-CM)]   Cardiac History     No past medical history on file.   Study Description     A two-dimensional transthoracic echocardiogram with color flow and Doppler was performed.   The study is technically good for diagnosis.Verbal consent was obtained from the patient to use Definity contrast in order to optimize the study. The use of Definity was indicated as two or more contiguous segments of the left ventricular endocardial border were unable to be adequately visualized by standard imaging methods. 1.3 mL of mechanically activated Definity was mixed with 8.7 mL of normal saline. A total of 1.5 mL of the resulting Definity solution was administered, and the remaining contrast was wasted and discarded.   No adverse reaction to contrast was noted.   O2 sat 99%.   Echocardiogram Findings     Left Ventricle Left ventricular systolic function is normal. Calculated EF = 56%. Estimated EF was in agreement with the calculated EF. Normal left ventricular cavity size and wall thickness noted. All left ventricular wall segments contract normally. Left ventricular diastolic function is normal.   Right Ventricle Normal right ventricular cavity size, wall thickness, systolic function and septal motion noted. "   Left Atrium Normal left atrial size and volume noted.   Right Atrium Normal right atrial size noted.   Aortic Valve The aortic valve is structurally normal. No aortic valve regurgitation is present. No aortic valve stenosis is present.   Mitral Valve The mitral valve is normal in structure. No mitral valve regurgitation is present. No significant mitral valve stenosis is present.   Tricuspid Valve The tricuspid valve is normal. No tricuspid valve regurgitation is present.   Pulmonic Valve The pulmonic valve is structurally normal. There is no significant pulmonic valve stenosis present. There is no pulmonic valve regurgitation present.   Greater Vessels No dilation of the aortic root is present. No dilation of the sinuses of Valsalva is present. No dilation of the proximal aorta is present. No dilation of the ascending aorta is present. No dilation of the aortic arch is present. No dilation of the descending aorta present. The inferior vena cava is normally sized. Partial IVC inspiratory collapse of less than 50% noted.   Pericardium The pericardium is normal. There is no evidence of pericardial effusion

## 2020-09-25 ENCOUNTER — OFFICE VISIT (OUTPATIENT)
Dept: FAMILY MEDICINE CLINIC | Facility: CLINIC | Age: 25
End: 2020-09-25

## 2020-09-25 VITALS
TEMPERATURE: 97.7 F | DIASTOLIC BLOOD PRESSURE: 70 MMHG | RESPIRATION RATE: 16 BRPM | HEIGHT: 65 IN | BODY MASS INDEX: 19.86 KG/M2 | WEIGHT: 119.2 LBS | HEART RATE: 104 BPM | SYSTOLIC BLOOD PRESSURE: 124 MMHG | OXYGEN SATURATION: 98 %

## 2020-09-25 DIAGNOSIS — R10.13 EPIGASTRIC PAIN: ICD-10-CM

## 2020-09-25 DIAGNOSIS — Z00.00 ANNUAL PHYSICAL EXAM: Primary | ICD-10-CM

## 2020-09-25 PROCEDURE — 90471 IMMUNIZATION ADMIN: CPT | Performed by: FAMILY MEDICINE

## 2020-09-25 PROCEDURE — 90686 IIV4 VACC NO PRSV 0.5 ML IM: CPT | Performed by: FAMILY MEDICINE

## 2020-09-25 PROCEDURE — 99395 PREV VISIT EST AGE 18-39: CPT | Performed by: FAMILY MEDICINE

## 2020-09-25 RX ORDER — OMEPRAZOLE 40 MG/1
40 CAPSULE, DELAYED RELEASE ORAL DAILY
Qty: 90 CAPSULE | Refills: 0 | Status: SHIPPED | OUTPATIENT
Start: 2020-09-25 | End: 2022-01-18 | Stop reason: SDUPTHER

## 2020-09-26 LAB
ALBUMIN SERPL-MCNC: 5.2 G/DL (ref 3.5–5.2)
ALBUMIN/GLOB SERPL: 2.6 G/DL
ALP SERPL-CCNC: 62 U/L (ref 39–117)
ALT SERPL-CCNC: 11 U/L (ref 1–33)
AST SERPL-CCNC: 16 U/L (ref 1–32)
BASOPHILS # BLD AUTO: 0.04 10*3/MM3 (ref 0–0.2)
BASOPHILS NFR BLD AUTO: 0.5 % (ref 0–1.5)
BILIRUB SERPL-MCNC: 0.4 MG/DL (ref 0–1.2)
BUN SERPL-MCNC: 14 MG/DL (ref 6–20)
BUN/CREAT SERPL: 19.4 (ref 7–25)
CALCIUM SERPL-MCNC: 9.2 MG/DL (ref 8.6–10.5)
CHLORIDE SERPL-SCNC: 102 MMOL/L (ref 98–107)
CO2 SERPL-SCNC: 23.9 MMOL/L (ref 22–29)
CREAT SERPL-MCNC: 0.72 MG/DL (ref 0.57–1)
EOSINOPHIL # BLD AUTO: 0.27 10*3/MM3 (ref 0–0.4)
EOSINOPHIL NFR BLD AUTO: 3.5 % (ref 0.3–6.2)
ERYTHROCYTE [DISTWIDTH] IN BLOOD BY AUTOMATED COUNT: 11.9 % (ref 12.3–15.4)
GLOBULIN SER CALC-MCNC: 2 GM/DL
GLUCOSE SERPL-MCNC: 75 MG/DL (ref 65–99)
HCT VFR BLD AUTO: 40.8 % (ref 34–46.6)
HCV AB S/CO SERPL IA: 0.1 S/CO RATIO (ref 0–0.9)
HGB BLD-MCNC: 14 G/DL (ref 12–15.9)
IMM GRANULOCYTES # BLD AUTO: 0.03 10*3/MM3 (ref 0–0.05)
IMM GRANULOCYTES NFR BLD AUTO: 0.4 % (ref 0–0.5)
LYMPHOCYTES # BLD AUTO: 2.98 10*3/MM3 (ref 0.7–3.1)
LYMPHOCYTES NFR BLD AUTO: 39.2 % (ref 19.6–45.3)
MCH RBC QN AUTO: 31.5 PG (ref 26.6–33)
MCHC RBC AUTO-ENTMCNC: 34.3 G/DL (ref 31.5–35.7)
MCV RBC AUTO: 91.9 FL (ref 79–97)
MONOCYTES # BLD AUTO: 0.61 10*3/MM3 (ref 0.1–0.9)
MONOCYTES NFR BLD AUTO: 8 % (ref 5–12)
NEUTROPHILS # BLD AUTO: 3.68 10*3/MM3 (ref 1.7–7)
NEUTROPHILS NFR BLD AUTO: 48.4 % (ref 42.7–76)
NRBC BLD AUTO-RTO: 0 /100 WBC (ref 0–0.2)
PLATELET # BLD AUTO: 227 10*3/MM3 (ref 140–450)
POTASSIUM SERPL-SCNC: 3.8 MMOL/L (ref 3.5–5.2)
PROT SERPL-MCNC: 7.2 G/DL (ref 6–8.5)
RBC # BLD AUTO: 4.44 10*6/MM3 (ref 3.77–5.28)
SODIUM SERPL-SCNC: 136 MMOL/L (ref 136–145)
TSH SERPL DL<=0.005 MIU/L-ACNC: 0.67 UIU/ML (ref 0.27–4.2)
WBC # BLD AUTO: 7.61 10*3/MM3 (ref 3.4–10.8)

## 2021-03-26 ENCOUNTER — OFFICE VISIT (OUTPATIENT)
Dept: FAMILY MEDICINE CLINIC | Facility: CLINIC | Age: 26
End: 2021-03-26

## 2021-03-26 VITALS
SYSTOLIC BLOOD PRESSURE: 100 MMHG | WEIGHT: 125.3 LBS | HEIGHT: 65 IN | HEART RATE: 109 BPM | RESPIRATION RATE: 17 BRPM | OXYGEN SATURATION: 100 % | BODY MASS INDEX: 20.88 KG/M2 | TEMPERATURE: 96.8 F | DIASTOLIC BLOOD PRESSURE: 62 MMHG

## 2021-03-26 DIAGNOSIS — R11.0 NAUSEA: ICD-10-CM

## 2021-03-26 DIAGNOSIS — R10.30 LOWER ABDOMINAL PAIN: Primary | ICD-10-CM

## 2021-03-26 DIAGNOSIS — N92.6 IRREGULAR MENSES: ICD-10-CM

## 2021-03-26 DIAGNOSIS — R30.9 PAIN WITH URINATION: ICD-10-CM

## 2021-03-26 DIAGNOSIS — R82.998 OTHER ABNORMAL FINDINGS IN URINE: ICD-10-CM

## 2021-03-26 DIAGNOSIS — R10.31 RLQ ABDOMINAL PAIN: ICD-10-CM

## 2021-03-26 LAB
ALBUMIN SERPL-MCNC: 4.8 G/DL (ref 3.5–5.2)
ALBUMIN/GLOB SERPL: 2 G/DL
ALP SERPL-CCNC: 66 U/L (ref 39–117)
ALT SERPL-CCNC: 9 U/L (ref 1–33)
AST SERPL-CCNC: 12 U/L (ref 1–32)
BASOPHILS # BLD AUTO: 0.03 10*3/MM3 (ref 0–0.2)
BASOPHILS NFR BLD AUTO: 0.5 % (ref 0–1.5)
BILIRUB BLD-MCNC: NEGATIVE MG/DL
BILIRUB SERPL-MCNC: 0.4 MG/DL (ref 0–1.2)
BUN SERPL-MCNC: 10 MG/DL (ref 6–20)
BUN/CREAT SERPL: 14.5 (ref 7–25)
CALCIUM SERPL-MCNC: 9.6 MG/DL (ref 8.6–10.5)
CHLORIDE SERPL-SCNC: 103 MMOL/L (ref 98–107)
CLARITY, POC: CLEAR
CO2 SERPL-SCNC: 24.9 MMOL/L (ref 22–29)
COLOR UR: YELLOW
CREAT SERPL-MCNC: 0.69 MG/DL (ref 0.57–1)
EOSINOPHIL # BLD AUTO: 0.23 10*3/MM3 (ref 0–0.4)
EOSINOPHIL NFR BLD AUTO: 3.5 % (ref 0.3–6.2)
ERYTHROCYTE [DISTWIDTH] IN BLOOD BY AUTOMATED COUNT: 12.1 % (ref 12.3–15.4)
GLOBULIN SER CALC-MCNC: 2.4 GM/DL
GLUCOSE SERPL-MCNC: 101 MG/DL (ref 65–99)
GLUCOSE UR STRIP-MCNC: NEGATIVE MG/DL
HCT VFR BLD AUTO: 41.3 % (ref 34–46.6)
HGB BLD-MCNC: 13.9 G/DL (ref 12–15.9)
IMM GRANULOCYTES # BLD AUTO: 0.01 10*3/MM3 (ref 0–0.05)
IMM GRANULOCYTES NFR BLD AUTO: 0.2 % (ref 0–0.5)
KETONES UR QL: NEGATIVE
LEUKOCYTE EST, POC: ABNORMAL
LYMPHOCYTES # BLD AUTO: 2.33 10*3/MM3 (ref 0.7–3.1)
LYMPHOCYTES NFR BLD AUTO: 35.4 % (ref 19.6–45.3)
MCH RBC QN AUTO: 30.8 PG (ref 26.6–33)
MCHC RBC AUTO-ENTMCNC: 33.7 G/DL (ref 31.5–35.7)
MCV RBC AUTO: 91.4 FL (ref 79–97)
MONOCYTES # BLD AUTO: 0.46 10*3/MM3 (ref 0.1–0.9)
MONOCYTES NFR BLD AUTO: 7 % (ref 5–12)
NEUTROPHILS # BLD AUTO: 3.52 10*3/MM3 (ref 1.7–7)
NEUTROPHILS NFR BLD AUTO: 53.4 % (ref 42.7–76)
NITRITE UR-MCNC: NEGATIVE MG/ML
NRBC BLD AUTO-RTO: 0 /100 WBC (ref 0–0.2)
PH UR: 7 [PH] (ref 5–8)
PLATELET # BLD AUTO: 258 10*3/MM3 (ref 140–450)
POTASSIUM SERPL-SCNC: 4.2 MMOL/L (ref 3.5–5.2)
PROT SERPL-MCNC: 7.2 G/DL (ref 6–8.5)
PROT UR STRIP-MCNC: NEGATIVE MG/DL
RBC # BLD AUTO: 4.52 10*6/MM3 (ref 3.77–5.28)
RBC # UR STRIP: NEGATIVE /UL
SODIUM SERPL-SCNC: 140 MMOL/L (ref 136–145)
SP GR UR: 1.02 (ref 1–1.03)
UROBILINOGEN UR QL: NORMAL
WBC # BLD AUTO: 6.58 10*3/MM3 (ref 3.4–10.8)

## 2021-03-26 PROCEDURE — 99214 OFFICE O/P EST MOD 30 MIN: CPT | Performed by: FAMILY MEDICINE

## 2021-03-26 PROCEDURE — 81003 URINALYSIS AUTO W/O SCOPE: CPT | Performed by: FAMILY MEDICINE

## 2021-03-26 RX ORDER — ONDANSETRON 4 MG/1
4 TABLET, FILM COATED ORAL EVERY 8 HOURS PRN
Qty: 20 TABLET | Refills: 0 | Status: SHIPPED | OUTPATIENT
Start: 2021-03-26 | End: 2022-01-18 | Stop reason: SDUPTHER

## 2021-03-26 NOTE — PROGRESS NOTES
"Chief Complaint  GI Problem (stomach issues, right. 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 Lifting is not bad but if child is sitting on her belly, any of her body weight is very painful.   Running around ok.   She can't think of any new activities where she would have pulled a muscle or hurt.   Says no changes to her BMs, no blood in the stool.   She does get a little nauseous, worst today. Still has appetite. He has not vomited.   Says no fever.   If she urinates with a full bladder she has pain in the right lower side, pain then and better after urination.   No blood in the urine.   Says her periods have changed. Little inconsistent. She has different timing. End of the month to middle of the month  Was having bad cramps historically and now the cramps are not as bad. Usually can't do much the first day of her period and now not bad.   Last period was send week of march.   She has irritation with tampons.  Pain is more so on the lower right side.   She is maybe having some more vaginal mucous with wiping after urination but says no abnormal or new vaginal discharge.   She has not been sexually active in 2 years.   She noticed recently that she has some dark spots flat with the skin in the labial fold on the left side only. She also noticed a white little pimple at the vaginal opening on the lower left as well.     Objective   Vital Signs:   /62 (BP Location: Right arm, Patient Position: Sitting, Cuff Size: Adult)   Pulse 109   Temp 96.8 °F (36 °C) (Infrared)   Resp 17   Ht 165.1 cm (65\")   Wt 56.8 kg (125 lb 4.8 oz)   SpO2 100%  "  BMI 20.85 kg/m²     Physical Exam  Vitals reviewed.   Constitutional:       General: She is not in acute distress.  Eyes:      General: No scleral icterus.     Conjunctiva/sclera: Conjunctivae normal.   Pulmonary:      Effort: Pulmonary effort is normal. No respiratory distress.   Abdominal:      General: Bowel sounds are normal. There is no distension.      Palpations: Abdomen is soft. There is no mass.      Tenderness: There is no abdominal tenderness. There is no guarding or rebound.      Comments: Bowel sounds soft, little sluggish.   Genitourinary:     Comments: There are several hyperpigmented macules, small just mm diameter, irregular border  Neurological:      Mental Status: She is alert and oriented to person, place, and time.   Psychiatric:         Mood and Affect: Mood normal.         Behavior: Behavior normal.        Result Review :                 Assessment and Plan    Diagnoses and all orders for this visit:    1. Lower abdominal pain (Primary)  -     Comprehensive Metabolic Panel  -     CBC & Differential  -     POC Urinalysis Dipstick, Automated    2. RLQ abdominal pain  -     Comprehensive Metabolic Panel  -     CBC & Differential  -     POC Urinalysis Dipstick, Automated    3. Pain with urination  -     Comprehensive Metabolic Panel  -     CBC & Differential  -     POC Urinalysis Dipstick, Automated    4. Other abnormal findings in urine  -     Urine Culture - Urine, Urine, Clean Catch; Future  -     Urine Culture - Urine, Urine, Clean Catch    5. Nausea    6. Irregular menses    Other orders  -     ondansetron (Zofran) 4 MG tablet; Take 1 tablet by mouth Every 8 (Eight) Hours As Needed for Nausea or Vomiting.  Dispense: 20 tablet; Refill: 0        Follow Up   No follow-ups on file.  Patient was given instructions and counseling regarding her condition or for health maintenance advice. Please see specific information pulled into the AVS if appropriate.     Overall she is doing a little better  today. The abdominal pain is LRQ, pinching pr tugging sensation. Feels more with urination like there is a forward/anterior pulling sensation that worsens with urination and improves after. Also pain with outward pressure on her abdomen like when the little girl she nannies for sits on her lap and leans back on the stomach. Had bad day, good day, bad day and now today good day again so far.   We checked the urine. Not overwhelming for infection so we will hold abx and culture.   Check other labs for signs of infection or biliary disruption.   She has had changes to her periods. She has been having inconsistent timing and more frequent periods though less painful cramping then prior periods.   If her pain persists I recommend pelvic and transvaginal ultrasound. She has close follow up already arranged with my partner, if she is better could cancel.

## 2021-03-28 LAB
BACTERIA UR CULT: NORMAL
BACTERIA UR CULT: NORMAL

## 2021-06-22 ENCOUNTER — TELEPHONE (OUTPATIENT)
Dept: FAMILY MEDICINE CLINIC | Facility: CLINIC | Age: 26
End: 2021-06-22

## 2021-06-22 NOTE — TELEPHONE ENCOUNTER
Caller: Letitia Hernandez    Relationship to patient: Self    Best call back number: [phone]    Patient is needing: PATIENT HAS HAD A SCRATCHY THROAT AND FEELS LIKE IT MAY BE SWOLLEN. AND HER CHEST FEELS VERY CONGESTED. SHE IS WONDERING IF SHE SHOULD KEEP TAKING OVER THE COUNTER MEDICATION OR IF AN ANTIBIOTIC WOULD BE BETTER. PLEASE REACH OUT AND ADVISE

## 2021-06-23 ENCOUNTER — OFFICE VISIT (OUTPATIENT)
Dept: FAMILY MEDICINE CLINIC | Facility: CLINIC | Age: 26
End: 2021-06-23

## 2021-06-23 VITALS
BODY MASS INDEX: 20.71 KG/M2 | HEART RATE: 73 BPM | SYSTOLIC BLOOD PRESSURE: 108 MMHG | OXYGEN SATURATION: 100 % | TEMPERATURE: 97.5 F | HEIGHT: 65 IN | DIASTOLIC BLOOD PRESSURE: 69 MMHG | WEIGHT: 124.3 LBS

## 2021-06-23 DIAGNOSIS — J02.9 PHARYNGITIS, UNSPECIFIED ETIOLOGY: Primary | ICD-10-CM

## 2021-06-23 PROCEDURE — 99213 OFFICE O/P EST LOW 20 MIN: CPT | Performed by: NURSE PRACTITIONER

## 2021-06-23 NOTE — PROGRESS NOTES
"Chief Complaint  Sore Throat (c/o achy throat, throat feels swollen,  persistant cough, L earache since friday )    Subjective          Letitia Hernandez presents to [facility]  History of Present Illness new patient to me.  She is here for sore throat that began this past Friday, seems to be improving a little bit recently.  Seems worse in the morning and tends to improve over the course of the day.  Yesterday she had a little bit of a left earache but today it seems better.  She feels like there is something thicker achy in her throat and makes it hard to swallow.  She denies any sick contacts.  She is up-to-date on Covid vaccine.  She has no fever no chills.  She has no congestion, runny nose.  She does have a dry cough, no dyspnea.  She does not have much in the way of seasonal allergies.    She has started having a little bit of nausea over the last day or so.    She has had a history of abdominal pain and acid reflux.  She is no longer on a PPI.  She also has quite a bit of motion sickness and her father would like her to have a test for H. pylori.  She does not know if this was ever tested for previously with EGD.      Objective   Vital Signs:   /69   Pulse 73   Temp 97.5 °F (36.4 °C)   Ht 165.1 cm (65\")   Wt 56.4 kg (124 lb 4.8 oz)   SpO2 100%   BMI 20.68 kg/m²     Physical Exam  Vitals and nursing note reviewed.   Constitutional:       General: She is not in acute distress.     Appearance: She is well-developed. She is not ill-appearing or diaphoretic.   HENT:      Head: Normocephalic and atraumatic.      Right Ear: Tympanic membrane, ear canal and external ear normal.      Left Ear: Tympanic membrane, ear canal and external ear normal.      Ears:      Comments: Left TM is a little pink, no bulge     Mouth/Throat:      Mouth: Mucous membranes are moist.      Pharynx: No oropharyngeal exudate or posterior oropharyngeal erythema.   Eyes:      General:         Right " eye: No discharge.         Left eye: No discharge.      Conjunctiva/sclera: Conjunctivae normal.   Cardiovascular:      Rate and Rhythm: Normal rate and regular rhythm.      Heart sounds: Normal heart sounds.   Pulmonary:      Effort: Pulmonary effort is normal.      Breath sounds: Normal breath sounds.   Abdominal:      General: Bowel sounds are normal.      Palpations: Abdomen is soft.      Tenderness: There is no abdominal tenderness.   Musculoskeletal:         General: No deformity.      Cervical back: Neck supple.      Comments: Gait smooth and steady   Lymphadenopathy:      Cervical: Cervical adenopathy (right anterior cervical LAD) present.   Skin:     General: Skin is warm and dry.   Neurological:      General: No focal deficit present.      Mental Status: She is alert and oriented to person, place, and time.   Psychiatric:         Mood and Affect: Mood normal.         Behavior: Behavior normal.        Result Review :                 Assessment and Plan    Diagnoses and all orders for this visit:    1. Pharyngitis, unspecified etiology (Primary)  -     H. Pylori Breath Test - Breath, Lung      Her exam was pretty benign.  She does have some right anterior cervical lymphadenopathy so suspect there may be drainage causing a cough and the sensation of difficulty swallowing due to PND.  We will have her start Mucinex daily with extra hydration and let me know how she is doing in the next several days.  If this does not seem to improve her symptoms I will have her restart her PPI.  I wonder if she has some acid reflux causing her symptoms.  Reasonable to get an H. pylori test today given her history of GI complaints.    Discussed signs and symptoms of worsening that would require follow-up visit.    Will have her follow-up with PCP.          Follow Up   Return if symptoms worsen or fail to improve.  Patient was given instructions and counseling regarding her condition or for health maintenance advice. Please see  specific information pulled into the AVS if appropriate.

## 2021-06-24 LAB — UREA BREATH TEST QL: NEGATIVE

## 2021-08-10 ENCOUNTER — TELEPHONE (OUTPATIENT)
Dept: FAMILY MEDICINE CLINIC | Facility: CLINIC | Age: 26
End: 2021-08-10

## 2021-08-10 NOTE — TELEPHONE ENCOUNTER
Caller: Letitia Hernandez    Relationship: Self    Best call back number:     What is the medical concern/diagnosis:     What specialty or service is being requested: ALLERGY    What is the provider, practice or medical service name:     What is the office location:     What is the office phone number:     Any additional details: PATIENT IS CALLING IN WANTING TO KNOW IF DR MORALES CAN REFER HER TO SEE SOMEONE SO THAT SHE CAN BE TESTED FOR ALLERGIES.

## 2021-08-13 DIAGNOSIS — T78.40XS ALLERGY, SEQUELA: Primary | ICD-10-CM

## 2021-12-27 ENCOUNTER — TELEPHONE (OUTPATIENT)
Dept: FAMILY MEDICINE CLINIC | Facility: CLINIC | Age: 26
End: 2021-12-27

## 2021-12-27 NOTE — TELEPHONE ENCOUNTER
Caller: Letitia Hernandez    Relationship to patient: Self    Best call back number: [phone]       Date of positive COVID19 test: 12/24/2021    COVID19 symptoms:     HEADACHE, BODY ACHES, RUNNY NOSE, SWOLLEN THROAT, SHORTNESS OF AIR WHEN LAYING DOWN, AND LOW GRADE FEVER AT TIMES    Date of initial quarantine: 12/24/2021    Additional information or concerns:     PATIENT IS EATING SOME NOT A LOT, DRINKING PLENTY OF FLUIDS.      What is the patients preferred pharmacy:     [id number] [address] AT [facility] [address] - [phone] [facility] [phone]

## 2021-12-28 NOTE — TELEPHONE ENCOUNTER
Patient symptoms are mild. She has been advised to push fluids, treat her symptoms, and if symptoms become worse and she is unable to keep fluids down, becomes sob, and is unable to control her fever to go to ER. She and her family have received the COVID vaccine. She has been advised to continue the quartine per the  notes.

## 2022-01-03 ENCOUNTER — TELEPHONE (OUTPATIENT)
Dept: FAMILY MEDICINE CLINIC | Facility: CLINIC | Age: 27
End: 2022-01-03

## 2022-01-03 LAB — QT INTERVAL: 333 MS

## 2022-01-03 PROCEDURE — 93005 ELECTROCARDIOGRAM TRACING: CPT

## 2022-01-03 PROCEDURE — 93010 ELECTROCARDIOGRAM REPORT: CPT | Performed by: INTERNAL MEDICINE

## 2022-01-03 PROCEDURE — 99283 EMERGENCY DEPT VISIT LOW MDM: CPT

## 2022-01-03 PROCEDURE — 93005 ELECTROCARDIOGRAM TRACING: CPT | Performed by: EMERGENCY MEDICINE

## 2022-01-03 PROCEDURE — 99284 EMERGENCY DEPT VISIT MOD MDM: CPT

## 2022-01-04 ENCOUNTER — APPOINTMENT (OUTPATIENT)
Dept: GENERAL RADIOLOGY | Facility: HOSPITAL | Age: 27
End: 2022-01-04

## 2022-01-04 ENCOUNTER — HOSPITAL ENCOUNTER (EMERGENCY)
Facility: HOSPITAL | Age: 27
Discharge: HOME | End: 2022-01-04
Attending: EMERGENCY MEDICINE | Admitting: EMERGENCY MEDICINE

## 2022-01-04 ENCOUNTER — APPOINTMENT (OUTPATIENT)
Dept: CT IMAGING | Facility: HOSPITAL | Age: 27
End: 2022-01-04

## 2022-01-04 VITALS
RESPIRATION RATE: 20 BRPM | WEIGHT: 125 LBS | TEMPERATURE: 97.7 F | SYSTOLIC BLOOD PRESSURE: 101 MMHG | BODY MASS INDEX: 20.83 KG/M2 | DIASTOLIC BLOOD PRESSURE: 68 MMHG | HEIGHT: 65 IN | HEART RATE: 91 BPM | OXYGEN SATURATION: 100 %

## 2022-01-04 DIAGNOSIS — R06.00 DYSPNEA, UNSPECIFIED TYPE: Primary | ICD-10-CM

## 2022-01-04 DIAGNOSIS — R07.89 CHEST WALL PAIN: ICD-10-CM

## 2022-01-04 LAB
ALBUMIN SERPL-MCNC: 4.9 G/DL (ref 3.5–5.2)
ALBUMIN/GLOB SERPL: 1.9 G/DL
ALP SERPL-CCNC: 72 U/L (ref 39–117)
ALT SERPL W P-5'-P-CCNC: 20 U/L (ref 1–33)
ANION GAP SERPL CALCULATED.3IONS-SCNC: 12.8 MMOL/L (ref 5–15)
AST SERPL-CCNC: 19 U/L (ref 1–32)
BASOPHILS # BLD AUTO: 0.04 10*3/MM3 (ref 0–0.2)
BASOPHILS NFR BLD AUTO: 0.4 % (ref 0–1.5)
BILIRUB SERPL-MCNC: 0.3 MG/DL (ref 0–1.2)
BUN SERPL-MCNC: 11 MG/DL (ref 6–20)
BUN/CREAT SERPL: 18.3 (ref 7–25)
CALCIUM SPEC-SCNC: 9.8 MG/DL (ref 8.6–10.5)
CHLORIDE SERPL-SCNC: 102 MMOL/L (ref 98–107)
CO2 SERPL-SCNC: 25.2 MMOL/L (ref 22–29)
CREAT SERPL-MCNC: 0.6 MG/DL (ref 0.57–1)
DEPRECATED RDW RBC AUTO: 39.9 FL (ref 37–54)
EOSINOPHIL # BLD AUTO: 0.14 10*3/MM3 (ref 0–0.4)
EOSINOPHIL NFR BLD AUTO: 1.6 % (ref 0.3–6.2)
ERYTHROCYTE [DISTWIDTH] IN BLOOD BY AUTOMATED COUNT: 12.3 % (ref 12.3–15.4)
GFR SERPL CREATININE-BSD FRML MDRD: 122 ML/MIN/1.73
GLOBULIN UR ELPH-MCNC: 2.6 GM/DL
GLUCOSE SERPL-MCNC: 110 MG/DL (ref 65–99)
HCG SERPL QL: NEGATIVE
HCT VFR BLD AUTO: 39.3 % (ref 34–46.6)
HGB BLD-MCNC: 13.5 G/DL (ref 12–15.9)
HOLD SPECIMEN: NORMAL
HOLD SPECIMEN: NORMAL
IMM GRANULOCYTES # BLD AUTO: 0.03 10*3/MM3 (ref 0–0.05)
IMM GRANULOCYTES NFR BLD AUTO: 0.3 % (ref 0–0.5)
LYMPHOCYTES # BLD AUTO: 2.9 10*3/MM3 (ref 0.7–3.1)
LYMPHOCYTES NFR BLD AUTO: 32.4 % (ref 19.6–45.3)
MCH RBC QN AUTO: 30.5 PG (ref 26.6–33)
MCHC RBC AUTO-ENTMCNC: 34.4 G/DL (ref 31.5–35.7)
MCV RBC AUTO: 88.7 FL (ref 79–97)
MONOCYTES # BLD AUTO: 0.63 10*3/MM3 (ref 0.1–0.9)
MONOCYTES NFR BLD AUTO: 7 % (ref 5–12)
NEUTROPHILS NFR BLD AUTO: 5.2 10*3/MM3 (ref 1.7–7)
NEUTROPHILS NFR BLD AUTO: 58.3 % (ref 42.7–76)
NRBC BLD AUTO-RTO: 0 /100 WBC (ref 0–0.2)
NT-PROBNP SERPL-MCNC: 46.9 PG/ML (ref 0–450)
PLATELET # BLD AUTO: 260 10*3/MM3 (ref 140–450)
PMV BLD AUTO: 9.9 FL (ref 6–12)
POTASSIUM SERPL-SCNC: 3.7 MMOL/L (ref 3.5–5.2)
PROT SERPL-MCNC: 7.5 G/DL (ref 6–8.5)
RBC # BLD AUTO: 4.43 10*6/MM3 (ref 3.77–5.28)
SODIUM SERPL-SCNC: 140 MMOL/L (ref 136–145)
TROPONIN T SERPL-MCNC: <0.01 NG/ML (ref 0–0.03)
WBC NRBC COR # BLD: 8.94 10*3/MM3 (ref 3.4–10.8)
WHOLE BLOOD HOLD SPECIMEN: NORMAL
WHOLE BLOOD HOLD SPECIMEN: NORMAL

## 2022-01-04 PROCEDURE — 93005 ELECTROCARDIOGRAM TRACING: CPT | Performed by: EMERGENCY MEDICINE

## 2022-01-04 PROCEDURE — 71275 CT ANGIOGRAPHY CHEST: CPT

## 2022-01-04 PROCEDURE — 84484 ASSAY OF TROPONIN QUANT: CPT | Performed by: EMERGENCY MEDICINE

## 2022-01-04 PROCEDURE — 85025 COMPLETE CBC W/AUTO DIFF WBC: CPT | Performed by: EMERGENCY MEDICINE

## 2022-01-04 PROCEDURE — 83880 ASSAY OF NATRIURETIC PEPTIDE: CPT | Performed by: EMERGENCY MEDICINE

## 2022-01-04 PROCEDURE — 0 IOPAMIDOL PER 1 ML: Performed by: EMERGENCY MEDICINE

## 2022-01-04 PROCEDURE — 71046 X-RAY EXAM CHEST 2 VIEWS: CPT

## 2022-01-04 PROCEDURE — 80053 COMPREHEN METABOLIC PANEL: CPT | Performed by: EMERGENCY MEDICINE

## 2022-01-04 PROCEDURE — 84703 CHORIONIC GONADOTROPIN ASSAY: CPT | Performed by: EMERGENCY MEDICINE

## 2022-01-04 RX ADMIN — IOPAMIDOL 90 ML: 755 INJECTION, SOLUTION INTRAVENOUS at 07:00

## 2022-01-04 RX ADMIN — SODIUM CHLORIDE 1000 ML: 9 INJECTION, SOLUTION INTRAVENOUS at 05:21

## 2022-01-04 NOTE — ED NOTES
Pt reassessed, does not appear to be in distress. Pt states she can always feel the chest pain but it increases and reduces in waves. States it feels like a pill is stuck but it is horizontally across the chest.      Paul Paul, RN  01/04/22 0354

## 2022-01-04 NOTE — ED TRIAGE NOTES
Pt presents the to ED from home with c/o sob and chest pain. She tested positive for COVID 19 on 12/24. Pt is tachycardic in triage

## 2022-01-04 NOTE — DISCHARGE INSTRUCTIONS
Go home and rest today.  No strenuous activity for the next 48 hours.  Follow-up with your doctor if not better in 48 hours or return the emergency room if worse.

## 2022-01-04 NOTE — ED NOTES
"Spoke with pt, updated them on current plan. Pt states pain in her chest feels like it is \"expanding and mushtaq\", occasionally radiating to back.     Pt thanked for her continued patience.      Trinh Bennett RN  01/04/22 0106    "

## 2022-01-04 NOTE — ED PROVIDER NOTES
EMERGENCY DEPARTMENT ENCOUNTER    Room Number:  12/12  Date of encounter:  1/5/2022  PCP: Karyan Mcneal MD  Historian: Patient      HPI:  Chief Complaint: Dyspnea chest pain  A complete HPI/ROS/PMH/PSH/SH/FH are unobtainable due to: None    Context: Letitia Hernandez is a 26 y.o. female who presents to the ED c/o indigestion-like chest pain and shortness of breath since this morning.  Patient reports that she was diagnosed with Covid on 12/24/2021.  Patient completed her isolation and started work today.  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  States she got winded.  Patient then called her PMD who told her that she needed to come be evaluated secondary to concern for blood clots.  Patient denies fever chills at this time.      PAST MEDICAL HISTORY  Active Ambulatory Problems     Diagnosis Date Noted   • Body mass index (BMI) of 19 or less in adult 04/18/2019     Resolved Ambulatory Problems     Diagnosis Date Noted   • No Resolved Ambulatory Problems     Past Medical History:   Diagnosis Date   • Gastric polyp    • GERD (gastroesophageal reflux disease)          PAST SURGICAL HISTORY  Past Surgical History:   Procedure Laterality Date   • ENDOSCOPY  02/10/2015    gastric polyp         FAMILY HISTORY  Family History   Problem Relation Age of Onset   • Hypertension Father    • Seizures Brother    • Hypertension Mother    • Cancer Maternal Aunt    • Cancer Maternal Grandmother          SOCIAL HISTORY  Social History     Socioeconomic History   • Marital status: Single   Tobacco Use   • Smoking status: Never Smoker   • Smokeless tobacco: Never Used   • Tobacco comment: tried smoking in high school, caffeine use maybe twice weekly   Substance and Sexual Activity   • Alcohol use: Yes     Comment: socially   • Drug use: Yes     Comment: marijuana twice monthly   • Sexual activity: Yes     Partners: Male     Birth control/protection: Condom         ALLERGIES  Patient has no known allergies.        REVIEW  OF SYSTEMS  Review of Systems     All systems reviewed and negative except for those discussed in HPI.       PHYSICAL EXAM    I have reviewed the triage vital signs and nursing notes.    ED Triage Vitals   Temp Heart Rate Resp BP SpO2   01/03/22 2044 01/03/22 2044 01/03/22 2044 01/03/22 2044 01/03/22 2243   97.7 °F (36.5 °C) (!) 138 16 138/68 100 %      Temp src Heart Rate Source Patient Position BP Location FiO2 (%)   01/03/22 2044 01/03/22 2044 01/03/22 2044 01/03/22 2044 --   Tympanic Monitor Standing Right arm        Physical Exam  GENERAL: not distressed  HENT: nares patent  EYES: no scleral icterus  CV: regular rhythm, regular rate  RESPIRATORY: normal effort, clear to auscultation bilaterally  ABDOMEN: soft nontender nondistended  MUSCULOSKELETAL: no deformity  NEURO: alert, moves all extremities, follows commands  SKIN: warm, dry        LAB RESULTS  No results found for this or any previous visit (from the past 24 hour(s)).    Ordered the above labs and independently reviewed the results.        RADIOLOGY  No Radiology Exams Resulted Within Past 24 Hours    I ordered the above noted radiological studies. Reviewed by me and discussed with radiologist.  See dictation for official radiology interpretation.      PROCEDURES    Procedures      MEDICATIONS GIVEN IN ER    Medications   sodium chloride 0.9 % bolus 1,000 mL (0 mL Intravenous Stopped 1/4/22 0647)   iopamidol (ISOVUE-370) 76 % injection 100 mL (90 mL Intravenous Given by Other 1/4/22 0700)         PROGRESS, DATA ANALYSIS, CONSULTS, AND MEDICAL DECISION MAKING    All labs have been independently reviewed by me.  All radiology studies have been reviewed by me and discussed with radiologist dictating the report.   EKG's independently viewed and interpreted by me.  Discussion below represents my analysis of pertinent findings related to patient's condition, differential diagnosis, treatment plan and final disposition.        ED Course as of 01/05/22 0706    Tue Jan 04, 2022   0515 EKG          EKG time: 2054  Rhythm/Rate: Sinus tachycardia rate 113  P waves and PA: Normal  QRS, axis: Narrow regular  ST and T waves: Normal    Interpreted Contemporaneously by me, independently viewed [TJ]   0702 Cared to Dr Silva.  Pending CTA results. [TJ]   0746 The patient's CTA chest was read as negative.  Upon repeat evaluation the patient is resting comfortably in the room in no acute distress.  I advised her of her negative work-up.  We have agreed that she is stable for discharge and outpatient follow-up.  I will discharge her per Dr. Choe's recommendations with her negative CT angiogram of the chest. [GP]      ED Course User Index  [GP] Kyle Silva MD  [TJ] Ant Choe MD           PPE: The patient wore a surgical mask throughout the entire patient encounter. I wore an N95.    AS OF 07:06 EST VITALS:    BP - 101/68  HR - 91  TEMP - 97.7 °F (36.5 °C)  O2 SATS - 100%        DIAGNOSIS  Final diagnoses:   Dyspnea, unspecified type   Chest wall pain         DISPOSITION  pending           Ant Choe MD  01/04/22 0703       Ant Choe MD  01/05/22 0707

## 2022-01-10 ENCOUNTER — TELEPHONE (OUTPATIENT)
Dept: FAMILY MEDICINE CLINIC | Facility: CLINIC | Age: 27
End: 2022-01-10

## 2022-01-10 NOTE — TELEPHONE ENCOUNTER
Caller: Letitia Hernandez    Relationship: Self    Best call back number: [phone]     What form or medical record are you requesting: LETTER STATING SHE IS NO LONGER CONTAGIOUS     Who is requesting this form or medical record from you:     How would you like to receive the form or medical records (pick-up, mail, fax):     Timeframe paperwork needed: ASAP    Additional notes: PATIENT STATES SHE TESTED POSITIVE FOR COVID ON 12/24 AND A  IS REQUESTING A NOTE THAT SHE IS NOT LONGER CONTAGIOUS. PATIENT WOULD LIKE IT UPLOADED TO [employer] IF POSSIBLE

## 2022-01-10 NOTE — TELEPHONE ENCOUNTER
PATIENT CALLED STATING THAT SHE WOULD NOT BE ABLE TO  THE NOTE THIS WEEK. PATIENT STATED THAT IT WOULD BE EASIER IF IT COULD BE MAILED TO HER OR UPLOADED TO MY CHART IF POSSIBLE PLEASE.

## 2022-01-10 NOTE — TELEPHONE ENCOUNTER
I called pt to check on if she has any residual symptoms, when was last fever and I can write the note.

## 2022-01-18 ENCOUNTER — OFFICE VISIT (OUTPATIENT)
Dept: FAMILY MEDICINE CLINIC | Facility: CLINIC | Age: 27
End: 2022-01-18

## 2022-01-18 VITALS
TEMPERATURE: 99.4 F | WEIGHT: 130.1 LBS | SYSTOLIC BLOOD PRESSURE: 108 MMHG | HEART RATE: 95 BPM | BODY MASS INDEX: 21.67 KG/M2 | DIASTOLIC BLOOD PRESSURE: 70 MMHG | HEIGHT: 65 IN | OXYGEN SATURATION: 98 % | RESPIRATION RATE: 18 BRPM

## 2022-01-18 DIAGNOSIS — Z00.00 ANNUAL PHYSICAL EXAM: Primary | ICD-10-CM

## 2022-01-18 DIAGNOSIS — R00.2 PALPITATIONS: ICD-10-CM

## 2022-01-18 PROCEDURE — 99395 PREV VISIT EST AGE 18-39: CPT | Performed by: FAMILY MEDICINE

## 2022-01-18 RX ORDER — OMEPRAZOLE 40 MG/1
40 CAPSULE, DELAYED RELEASE ORAL DAILY
Qty: 90 CAPSULE | Refills: 3 | Status: SHIPPED | OUTPATIENT
Start: 2022-01-18 | End: 2023-02-21 | Stop reason: SDUPTHER

## 2022-01-18 RX ORDER — ESCITALOPRAM OXALATE 5 MG/1
5 TABLET ORAL DAILY
Qty: 90 TABLET | Refills: 0 | Status: SHIPPED | OUTPATIENT
Start: 2022-01-18 | End: 2022-08-15 | Stop reason: ALTCHOICE

## 2022-01-18 RX ORDER — ONDANSETRON 4 MG/1
4 TABLET, FILM COATED ORAL EVERY 8 HOURS PRN
Qty: 20 TABLET | Refills: 0 | Status: SHIPPED | OUTPATIENT
Start: 2022-01-18 | End: 2022-02-15 | Stop reason: CLARIF

## 2022-01-18 NOTE — PROGRESS NOTES
"Chief Complaint  Annual Exam    Subjective          Letiita Hernandez presents to [facility]  History of Present Illness  Annual exam    Feels like her anxiety is worse.   Feels like her heart rate is changing. Feels it a lot more.   Managed it before with talking it through and her breathing. She used to get hot and would get cold air  Now feels like brain is getting foggy and can't think her self through it.   Feels open to try medication.     Episodes of near syncope. Cold feels better and resolves.   She lies down after for about 30 minutes.   Has not happened for 2-3 years.   Feels her body getting hot.     Objective   Vital Signs:   /70 (BP Location: Right arm, Patient Position: Sitting, Cuff Size: Adult)   Pulse 95   Temp 99.4 °F (37.4 °C) (Temporal)   Resp 18   Ht 165.1 cm (65\")   Wt 59 kg (130 lb 1.6 oz)   SpO2 98%   BMI 21.65 kg/m²     Physical Exam  Vitals reviewed.   Constitutional:       General: She is not in acute distress.  HENT:      Right Ear: Tympanic membrane, ear canal and external ear normal.      Left Ear: Tympanic membrane, ear canal and external ear normal.      Nose: Nose normal.      Mouth/Throat:      Mouth: Mucous membranes are moist.      Pharynx: Oropharynx is clear. No oropharyngeal exudate or posterior oropharyngeal erythema.   Eyes:      General: No scleral icterus.        Right eye: No discharge.         Left eye: No discharge.      Conjunctiva/sclera: Conjunctivae normal.   Neck:      Thyroid: No thyromegaly.      Vascular: No carotid bruit.   Cardiovascular:      Rate and Rhythm: Normal rate and regular rhythm.      Heart sounds: Normal heart sounds. No murmur heard.  No friction rub. No gallop.    Pulmonary:      Effort: Pulmonary effort is normal. No respiratory distress.      Breath sounds: Normal breath sounds. No wheezing or rales.   Chest:      Chest wall: No tenderness.   Abdominal:      General: Bowel sounds are normal. There is " no distension.      Palpations: Abdomen is soft. There is no mass.      Tenderness: There is no abdominal tenderness. There is no guarding.   Musculoskeletal:         General: No deformity.      Cervical back: Neck supple.      Right lower leg: No edema.      Left lower leg: No edema.   Lymphadenopathy:      Cervical: No cervical adenopathy.   Skin:     General: Skin is warm and dry.   Neurological:      Mental Status: She is alert and oriented to person, place, and time.      Motor: No abnormal muscle tone.      Coordination: Coordination normal.   Psychiatric:         Mood and Affect: Mood normal.         Behavior: Behavior normal.        Result Review :                 Assessment and Plan    Diagnoses and all orders for this visit:    1. Annual physical exam (Primary)    2. Palpitations  -     TSH    Other orders  -     omeprazole (priLOSEC) 40 MG capsule; Take 1 capsule by mouth Daily.  Dispense: 90 capsule; Refill: 3  -     ondansetron (Zofran) 4 MG tablet; Take 1 tablet by mouth Every 8 (Eight) Hours As Needed for Nausea or Vomiting.  Dispense: 20 tablet; Refill: 0  -     escitalopram (Lexapro) 5 MG tablet; Take 1 tablet by mouth Daily.  Dispense: 90 tablet; Refill: 0        Follow Up   No follow-ups on file.  Patient was given instructions and counseling regarding her condition or for health maintenance advice. Please see specific information pulled into the AVS if appropriate.     Preventive counseling  Discussed COVID and timing for booster.   She is recommended for gyn care. Encouraged to call for follow up. She has gyn she sees but hard to get in with her doctor at this time. Can see NP there for her exam. Pt voiced understanding.     Thermal dysregulation? Pt concerned about her symptoms. Last time she had any symptoms was about 2 years ago. She has been seen by cards. Discussed holter but hard to suggest without symptoms in so long.   She senses temperature chain. Hot and sweating and then lightheaded,  changes in vision, fast heart rate. Improves with cooling herself off with cool compress on her neck or going outside in cool air.     Anxiety worse since COVID. She would like to start medication. We discussed the medication and going to start low dose. We discussed possible side effects. She is going to let me know if she has any concerns or questions.

## 2022-01-19 LAB — TSH SERPL DL<=0.005 MIU/L-ACNC: 0.43 UIU/ML (ref 0.45–4.5)

## 2022-01-21 ENCOUNTER — TELEPHONE (OUTPATIENT)
Dept: FAMILY MEDICINE CLINIC | Facility: CLINIC | Age: 27
End: 2022-01-21

## 2022-01-21 DIAGNOSIS — R79.89 LOW SERUM THYROID STIMULATING HORMONE (TSH): Primary | ICD-10-CM

## 2022-01-21 NOTE — TELEPHONE ENCOUNTER
PATIENT IS CALLING IN STATING THAT SHE IS ON HER DADS INSURANCE AND HER MEDICATION ARE NORMALLY FILLED FOR 90 DAYS.  SHE WAS TOLD THAT FOR THE 90 DAY REFILLS SHE WILL NOW HAVE TO GET THOSE FROM THE [facility].  SHE WANTS TO KNOW WHERE THIS IS LOCATED.    PATIENT CALL BACK  [phone]  OK TO LEAVE A VOICEMAIL

## 2022-01-22 LAB
Lab: NORMAL
T4 FREE SERPL-MCNC: 1.33 NG/DL (ref 0.82–1.77)
T4 SERPL-MCNC: 8.8 UG/DL (ref 4.5–12)
WRITTEN AUTHORIZATION: NORMAL

## 2022-01-31 ENCOUNTER — TELEPHONE (OUTPATIENT)
Dept: FAMILY MEDICINE CLINIC | Facility: CLINIC | Age: 27
End: 2022-01-31

## 2022-01-31 NOTE — TELEPHONE ENCOUNTER
PATIENT CALLED AND STATES SHE HAD AN EPISODE OF HER BODY BEING FLUSH AND HOT, HEART RHYTHM GET FAST, LIPS GET PURPLE, AND FACE GETS PALE  THIS HAPPENED LAST NIGHT, THE DURATION WAS 20-30 MINUTES.     WHEN SHE STARTS TO FEEL IT COMING ON SHE PUTS COLD WATER ON THE BACK OF HER NECK.      SHE STATES DR. MORALES WANTED TO KNOW THE NEXT TIME SHE HAS AN EPISODE.    CALL BACK NUMBER [phone]

## 2022-02-01 ENCOUNTER — TELEPHONE (OUTPATIENT)
Dept: CARDIOLOGY | Facility: CLINIC | Age: 27
End: 2022-02-01

## 2022-02-01 NOTE — TELEPHONE ENCOUNTER
Called transferred to me from scheduling.,  Pt had an episode Sunday as stated above.  She clocked her heart rate as 170-180 at the time of the event.  She said she has not had reoccurrence. I have advised she go to the ER if her symptoms return between now and her apt tomorrow with [facility]  Thanks  Alanna Wu RN  Triage nurse

## 2022-02-02 ENCOUNTER — OFFICE VISIT (OUTPATIENT)
Dept: CARDIOLOGY | Facility: CLINIC | Age: 27
End: 2022-02-02

## 2022-02-02 VITALS
DIASTOLIC BLOOD PRESSURE: 76 MMHG | HEART RATE: 86 BPM | HEIGHT: 65 IN | WEIGHT: 128 LBS | BODY MASS INDEX: 21.33 KG/M2 | SYSTOLIC BLOOD PRESSURE: 115 MMHG

## 2022-02-02 DIAGNOSIS — R00.0 TACHYCARDIA: ICD-10-CM

## 2022-02-02 DIAGNOSIS — R00.2 PALPITATIONS: Primary | ICD-10-CM

## 2022-02-02 PROCEDURE — 93000 ELECTROCARDIOGRAM COMPLETE: CPT | Performed by: NURSE PRACTITIONER

## 2022-02-02 PROCEDURE — 99214 OFFICE O/P EST MOD 30 MIN: CPT | Performed by: NURSE PRACTITIONER

## 2022-02-02 RX ORDER — CETIRIZINE HYDROCHLORIDE 10 MG/1
10 TABLET ORAL DAILY
COMMUNITY

## 2022-02-02 NOTE — PROGRESS NOTES
Date of Office Visit: 2022  Encounter Provider: Marianna Finch, MARIA ALEJANDRA, APRN  Place of Service: [facility] CARDIOLOGY  Patient Name: Letitia Hernandez  :1995        Subjective:     Chief Complaint:  Near syncope, reported tachycardia      History of Present Illness:  Letitia Hernandez is a 26 y.o. female patient of Dr. Matthews.  This patient is new to me and I have reviewed her records.    Patient has a history of near syncope, abnormal EKG.    Patient was seen by Dr. Matthews 10/2019.  She apparently had had a rhythm screening at a health [facility] and had a T wave inversion as well as merged P wave with a QRS was noted.  She reported occasional shortness of breath symptoms.  She also reported intermittent near syncope for the past several years and reported that in the past her lips had turned blue with an episode.  She reported improvement in symptoms with putting her head under cold faucet.  She reported that episodes would happen a couple times a year randomly without associated symptoms.  Echocardiogram 2019 showed normal LV systolic function with EF of 56%, normal left atrial size and volume, no significant valvular issues.    Patient was diagnosed with COVID-19 2021.  She developed some shortness of breath and tachycardia and was seen in the ER 2022 and had a CTA chest showing no evidence of PE.  Thoracic aorta showed no evidence of aneurysm or dissection.  There is no evidence of pneumonia or pericardial effusion.  Chest x-ray was also unremarkable.  Troponin was negative and proBNP was normal.      Patient presents to office today for follow-up appointment.  Patient reports that at least since  she will get some rare episodes of feeling flushed and feeling like she develops tunnel vision and symptoms improved if she is able to put cold water on the back of her neck.  She reports she looks pale with episodes and sometimes her lips look almost blue.  She feels  like her heart rate is elevated at these times as well.  Episodes can occur while sitting or standing.  She reports that similar to previous episodes, this past Sunday she did not have tunnel vision but felt flushed and hot and felt like her heart rate was elevated.  She was able to use her parents pulse ox and heart rate was 178 on pulse ox per patient.  She also reports that sometimes she will get some tingling to her fingers with the symptoms.  She does think that stress seems to trigger the symptoms.  She is not sure if episodes may be related to a panic attack although do not always seem to be when she is feeling panicked.  She has been dealing with more anxiety recently.  She will occasionally get some mild shortness of breath his heart rate elevated or lightheadedness or near syncope with the above symptoms.  Denies exertional issues or concerns.  Denies chest pain or discomfort, exertional shortness of breath, edema, syncope, or abnormal bleeding.  Blood pressure and heart rate well controlled in the office today.          Past Medical History:   Diagnosis Date   • Gastric polyp    • GERD (gastroesophageal reflux disease)      Past Surgical History:   Procedure Laterality Date   • ENDOSCOPY  02/10/2015    gastric polyp     Outpatient Medications Prior to Visit   Medication Sig Dispense Refill   • cetirizine (zyrTEC) 10 MG tablet Take 10 mg by mouth Daily.     • escitalopram (Lexapro) 5 MG tablet Take 1 tablet by mouth Daily. 90 tablet 0   • fluticasone (FLONASE) 50 MCG/ACT nasal spray Shake liquid and use 2 sprays in each nostril daily. 16 g 10   • Multiple Vitamins-Minerals (MULTIVITAMIN WITH MINERALS) tablet Take  by mouth.     • omeprazole (priLOSEC) 40 MG capsule Take 1 capsule by mouth Daily. 90 capsule 3   • ondansetron (Zofran) 4 MG tablet Take 1 tablet by mouth Every 8 (Eight) Hours As Needed for Nausea or Vomiting. 20 tablet 0   • cetirizine (zyrTEC) 10 MG tablet Take 1 tablet by mouth Daily As  "Needed for runny nose, sneezing, itching. May take twice daily as needed for hives. 30 tablet 10   • ondansetron (ZOFRAN) 4 MG tablet Take 1 tablet by mouth Every 8 (Eight) Hours As Needed nausea or vomiting. 20 tablet 0     No facility-administered medications prior to visit.       Allergies as of 02/02/2022   • (No Known Allergies)     Social History     Socioeconomic History   • Marital status: Single   Tobacco Use   • Smoking status: Never Smoker   • Smokeless tobacco: Never Used   • Tobacco comment: tried smoking in high school, caffeine use maybe twice weekly   Substance and Sexual Activity   • Alcohol use: Not Currently     Comment: socially   • Drug use: Not Currently     Comment: marijuana twice monthly   • Sexual activity: Yes     Partners: Male     Birth control/protection: Condom     Comment: not since 2019     Family History   Problem Relation Age of Onset   • Hypertension Father    • Seizures Brother    • Hypertension Mother    • Cancer Maternal Aunt    • Cancer Maternal Grandmother        Review of Systems   Constitutional: Positive for malaise/fatigue (Chronic, unchanged).   Cardiovascular:        SEE HPI   Respiratory: Positive for shortness of breath (Chronic, unchanged).    Hematologic/Lymphatic: Negative for bleeding problem.   Musculoskeletal: Negative for falls.   Gastrointestinal: Negative for melena.   Genitourinary: Negative for hematuria.   Neurological: Positive for dizziness (Chronic, unchanged).   Psychiatric/Behavioral: Negative for altered mental status.          Objective:     Vitals:    02/02/22 1033   BP: 115/76   BP Location: Left arm   Pulse: 86   Weight: 58.1 kg (128 lb)   Height: 165.1 cm (65\")     Body mass index is 21.3 kg/m².      PHYSICAL EXAM:  Constitutional:       General: Not in acute distress.     Appearance: Well-developed. Not diaphoretic.   Eyes:      Pupils: Pupils are equal, round, and reactive to light.   HENT:      Head: Normocephalic and atraumatic.   Neck:      " Vascular: No carotid bruit or JVD.   Pulmonary:      Effort: Pulmonary effort is normal. No respiratory distress.      Breath sounds: Normal breath sounds. No wheezing. No rales.   Cardiovascular:      Normal rate. Regular rhythm.      Murmurs: There is no murmur.      No gallop. No click. No rub.   Edema:     Peripheral edema absent.   Abdominal:      General: Bowel sounds are normal. There is no distension.      Palpations: Abdomen is soft.   Musculoskeletal:         General: No tenderness or deformity.      Cervical back: Neck supple. Skin:     General: Skin is warm and dry.      Findings: No erythema or rash.   Neurological:      Mental Status: Alert and oriented to person, place, and time.   Psychiatric:         Behavior: Behavior normal.         Judgment: Judgment normal.             ECG 12 Lead    Date/Time: 2/2/2022 10:52 AM  Performed by: Marianna Finch DNP, APRN  Authorized by: Marianna Finch DNP, APRN   Comparison: compared with previous ECG from 1/3/2022  Rhythm: sinus rhythm  BPM: 86  Comments: Patient no longer tachycardic.  Otherwise no significant changes from previous EKG.              Assessment:       Diagnosis Plan   1. Palpitations  Holter Monitor - 72 Hour Up To 15 Days   2. Tachycardia  Holter Monitor - 72 Hour Up To 15 Days         Plan:     1. Reported tachycardia: She has had intermittent episodes since at least 2015.  She did not have episode for quite some time after her last office visit but feels with increased stress she has noticed recurrence.  Symptoms possibly vasovagal or anxiety related?  She had echocardiogram in 2019 that was unremarkable.  We will check Zio patch to evaluate further.  She does also have an apple watch which she believes she can check an EKG strip on though has had issues pairing this with her phone.  If unable to catch any episodes on the Zio patch then she will look into getting this sorted out so she can hopefully catch an EKG strip via Apple Watch if  she were to have recurrent episode in the future while Zio patch not in place.  In the meantime, I did also recommend continuing to work on stress and anxiety management we also discussed using for some light walking and possibly looking into some yoga as well.  2. Near syncope: Plan as above.  Possibly vasovagal associated with overheating?  3. History of COVID-19  4. Anxiety: PCP managing.  On Lexapro.  5. Low TSH: Defer to PCP    Patient follow-up with Dr. Matthews in 2 to 3 months or sooner if needed for any new, recurrent, or worsening symptoms or concerns.  Discussed in detail signs/symptoms that warrant sooner call or follow-up to the office.               Your medication list          Accurate as of February 2, 2022 11:29 AM. If you have any questions, ask your nurse or doctor.            CONTINUE taking these medications      Instructions Last Dose Given Next Dose Due   cetirizine 10 MG tablet  Commonly known as: zyrTEC      Take 10 mg by mouth Daily.       escitalopram 5 MG tablet  Commonly known as: Lexapro      Take 1 tablet by mouth Daily.       fluticasone 50 MCG/ACT nasal spray  Commonly known as: FLONASE      Shake liquid and use 2 sprays in each nostril daily.       multivitamin with minerals tablet tablet  Generic drug: multivitamin with minerals      Take  by mouth.       omeprazole 40 MG capsule  Commonly known as: priLOSEC      Take 1 capsule by mouth Daily.       ondansetron 4 MG tablet  Commonly known as: Zofran      Take 1 tablet by mouth Every 8 (Eight) Hours As Needed for Nausea or Vomiting.              The above medication changes may not have been made by this provider.  Patient's medication list was updated to reflect medications they are currently taking including medication changes made by other providers.            Thanks,    Marianna Finch, DNP, APRN  02/02/2022         Dictated utilizing Dragon dictation

## 2022-02-07 NOTE — TELEPHONE ENCOUNTER
Pt states saw Dr. Matthews's APRN.  She is currently wearing a zio patch right now. Has the 14 day one on.

## 2022-02-15 RX ORDER — TRAZODONE HYDROCHLORIDE 50 MG/1
25-50 TABLET ORAL NIGHTLY
Qty: 30 TABLET | Refills: 0 | Status: SHIPPED | OUTPATIENT
Start: 2022-02-15 | End: 2022-08-15 | Stop reason: ALTCHOICE

## 2022-02-15 RX ORDER — ONDANSETRON 4 MG/1
4 TABLET, ORALLY DISINTEGRATING ORAL EVERY 8 HOURS PRN
Qty: 20 TABLET | Refills: 0 | Status: SHIPPED | OUTPATIENT
Start: 2022-02-15 | End: 2022-08-31 | Stop reason: SDUPTHER

## 2022-02-17 ENCOUNTER — TELEPHONE (OUTPATIENT)
Dept: FAMILY MEDICINE CLINIC | Facility: CLINIC | Age: 27
End: 2022-02-17

## 2022-02-17 NOTE — TELEPHONE ENCOUNTER
"I spoke with this pt about her symptoms.  She said lots of trouble sleeping.   She is feeling a little better but had episodes where she was getting those hot flashes and feeling like she was \"disociating\" multiple times in one day. She does have the holter monitor on and did record the episodes so they will be marked when it is reviewed. She will turn in her holter monitor tomorrow.   Her nausea is not really improved with the zofran. Takes 30-40 min for it to help. She has pills to swallow. We are going to switch to the ODT. These work better for her.   Talked about the sleep.  Not crazy about using sleeping pills but she really wants to sleep.   The medications for sleep can effect heart rhythm. Going to do small dose and counseled pt. She is going to try half of the trazodone to start.   We will see what the holter shows and if her symptoms are not improving she will call and we will further evaluate.  "

## 2022-02-17 NOTE — TELEPHONE ENCOUNTER
----- Message from Julianne Zuluaga LPN sent at 2/14/2022  8:49 AM EST -----  Regarding: FW: Persistent nausea     ----- Message -----  From: Letitia Hernandez  Sent: 2/13/2022   5:41 AM EST  To: [facility]  Subject: Persistent nausea                                Hello, for the last 2 weeks I’ve been having a terrible time sleeping— I’m exhausted during the day but trouble sleeping at night; restless, tired but can’t fall asleep or stay asleep.     For the last week I’ve been waking up nauseous and continuing to feel nauseous throughout the day, but have not thrown up. The pills work but take a while to kick in. The nausea is affecting my sleep too.     I’m not sure exactly what’s going on but wanted to message to see if you might have any solutions or want me to come in.     No fever or any other signs of illness.    The lack of sleep has made me feel very weird including slight headaches, grogginess, and feels like I’m disassociating  at times. Also today I woke up around 4am unable to get back to sleep and it seems my vision is a little off but could be the grogginess.     I also haven’t had a period since December (currently 23 days late) but there is absolutely zero chance of pregnancy.    Thank you!

## 2022-02-25 ENCOUNTER — TELEPHONE (OUTPATIENT)
Dept: CARDIOLOGY | Facility: CLINIC | Age: 27
End: 2022-02-25

## 2022-02-25 LAB
MAXIMAL PREDICTED HEART RATE: 195 BPM
STRESS TARGET HR: 166 BPM

## 2022-02-25 NOTE — TELEPHONE ENCOUNTER
No abnormal heart rhythms noted on the monitor.    Would have her stay hydrated, avoid stimulants, change positions slowly, avoid overheating, and if symptoms are positional she could look into knee-high compression socks as well.    Would have her send prescription via [employer] if she has recurrence.    Otherwise keep follow-up with Dr. Matthews as scheduled.

## 2022-02-25 NOTE — TELEPHONE ENCOUNTER
Notified pt. She verbalized understanding.    Thank you,    Shereen Davis, RN  Triage [facility],

## 2022-02-25 NOTE — TELEPHONE ENCOUNTER
Call patient to discuss monitor results but no answer.  Left a voicemail asking her to call the office back.      TRIAGE RN---   please let patient know that monitor looks normal.  Would see if she can get her apple watch to sync with her phone, as previously discussed, that way if she has another episode of palpitations in the future she can check an EKG strip on apple watch and send it to us in [facility].  Would stay hydrated and avoid stimulants.  Would keep follow-up with Dr. Matthews as scheduled or call sooner for new, recurrent, or worsening symptoms prior to that time.

## 2022-02-25 NOTE — TELEPHONE ENCOUNTER
Patient called back. Went over results. She verbalized understanding. She has still been trying to get her apple watch to sync with her phone. She hasn't been successful, even after talking with [employer] support. She is going to look into the [employer] device.    She said the episodes of palpitations are still happening, but they aren't causing her as much trouble with tunnel vision. They are a little better. She said she only noticed about two episodes while she was wearing the monitor.    Thank you,    Shereen Davis RN  Triage [facility]

## 2022-03-03 ENCOUNTER — OFFICE VISIT (OUTPATIENT)
Dept: FAMILY MEDICINE CLINIC | Facility: CLINIC | Age: 27
End: 2022-03-03

## 2022-03-03 VITALS
BODY MASS INDEX: 22.06 KG/M2 | HEIGHT: 65 IN | WEIGHT: 132.4 LBS | OXYGEN SATURATION: 94 % | SYSTOLIC BLOOD PRESSURE: 100 MMHG | DIASTOLIC BLOOD PRESSURE: 62 MMHG | TEMPERATURE: 98.9 F | HEART RATE: 100 BPM | RESPIRATION RATE: 19 BRPM

## 2022-03-03 DIAGNOSIS — R41.840 ATTENTION DEFICIT: Primary | ICD-10-CM

## 2022-03-03 DIAGNOSIS — F41.1 GENERALIZED ANXIETY DISORDER: ICD-10-CM

## 2022-03-03 PROCEDURE — 99213 OFFICE O/P EST LOW 20 MIN: CPT | Performed by: FAMILY MEDICINE

## 2022-03-14 DIAGNOSIS — R55 SYNCOPE, UNSPECIFIED SYNCOPE TYPE: Primary | ICD-10-CM

## 2022-03-23 ENCOUNTER — HOSPITAL ENCOUNTER (OUTPATIENT)
Dept: CARDIOLOGY | Facility: HOSPITAL | Age: 27
Discharge: HOME | End: 2022-03-23
Admitting: FAMILY MEDICINE

## 2022-03-23 DIAGNOSIS — R55 SYNCOPE, UNSPECIFIED SYNCOPE TYPE: ICD-10-CM

## 2022-03-23 LAB
BH CV XLRA MEAS LEFT DIST CCA EDV: 36 CM/SEC
BH CV XLRA MEAS LEFT DIST CCA PSV: 140 CM/SEC
BH CV XLRA MEAS LEFT DIST ICA EDV: 39 CM/SEC
BH CV XLRA MEAS LEFT DIST ICA PSV: 105 CM/SEC
BH CV XLRA MEAS LEFT ICA/CCA RATIO: 0.86
BH CV XLRA MEAS LEFT MID ICA EDV: 44 CM/SEC
BH CV XLRA MEAS LEFT MID ICA PSV: 121 CM/SEC
BH CV XLRA MEAS LEFT PROX CCA EDV: 34 CM/SEC
BH CV XLRA MEAS LEFT PROX CCA PSV: 226 CM/SEC
BH CV XLRA MEAS LEFT PROX ECA EDV: 15 CM/SEC
BH CV XLRA MEAS LEFT PROX ECA PSV: 93 CM/SEC
BH CV XLRA MEAS LEFT PROX ICA EDV: 35 CM/SEC
BH CV XLRA MEAS LEFT PROX ICA PSV: 115 CM/SEC
BH CV XLRA MEAS LEFT PROX SCLA PSV: 206 CM/SEC
BH CV XLRA MEAS LEFT VERTEBRAL A EDV: 24 CM/SEC
BH CV XLRA MEAS LEFT VERTEBRAL A PSV: 102 CM/SEC
BH CV XLRA MEAS RIGHT DIST CCA EDV: 40 CM/SEC
BH CV XLRA MEAS RIGHT DIST CCA PSV: 201 CM/SEC
BH CV XLRA MEAS RIGHT DIST ICA EDV: 44 CM/SEC
BH CV XLRA MEAS RIGHT DIST ICA PSV: 129 CM/SEC
BH CV XLRA MEAS RIGHT ICA/CCA RATIO: 0.64
BH CV XLRA MEAS RIGHT MID ICA EDV: 31 CM/SEC
BH CV XLRA MEAS RIGHT MID ICA PSV: 121 CM/SEC
BH CV XLRA MEAS RIGHT PROX CCA EDV: 26 CM/SEC
BH CV XLRA MEAS RIGHT PROX CCA PSV: 236 CM/SEC
BH CV XLRA MEAS RIGHT PROX ECA EDV: 17 CM/SEC
BH CV XLRA MEAS RIGHT PROX ECA PSV: 118 CM/SEC
BH CV XLRA MEAS RIGHT PROX ICA EDV: 26 CM/SEC
BH CV XLRA MEAS RIGHT PROX ICA PSV: 116 CM/SEC
BH CV XLRA MEAS RIGHT PROX SCLA EDV: 0 CM/SEC
BH CV XLRA MEAS RIGHT PROX SCLA PSV: 143 CM/SEC
BH CV XLRA MEAS RIGHT VERTEBRAL A EDV: 19 CM/SEC
BH CV XLRA MEAS RIGHT VERTEBRAL A PSV: 117 CM/SEC
MAXIMAL PREDICTED HEART RATE: 195 BPM
STRESS TARGET HR: 166 BPM

## 2022-03-23 PROCEDURE — 93880 EXTRACRANIAL BILAT STUDY: CPT

## 2022-04-26 ENCOUNTER — OFFICE VISIT (OUTPATIENT)
Dept: FAMILY MEDICINE CLINIC | Facility: CLINIC | Age: 27
End: 2022-04-26

## 2022-04-26 VITALS
HEIGHT: 65 IN | OXYGEN SATURATION: 99 % | HEART RATE: 105 BPM | WEIGHT: 133 LBS | RESPIRATION RATE: 12 BRPM | TEMPERATURE: 97.3 F | DIASTOLIC BLOOD PRESSURE: 73 MMHG | SYSTOLIC BLOOD PRESSURE: 115 MMHG | BODY MASS INDEX: 22.16 KG/M2

## 2022-04-26 DIAGNOSIS — L25.9 CONTACT DERMATITIS, UNSPECIFIED CONTACT DERMATITIS TYPE, UNSPECIFIED TRIGGER: Primary | ICD-10-CM

## 2022-04-26 PROCEDURE — 99213 OFFICE O/P EST LOW 20 MIN: CPT | Performed by: FAMILY MEDICINE

## 2022-04-26 RX ORDER — TRIAMCINOLONE ACETONIDE 0.25 MG/G
1 CREAM TOPICAL 2 TIMES DAILY
Qty: 15 G | Refills: 0 | Status: SHIPPED | OUTPATIENT
Start: 2022-04-26 | End: 2022-08-15

## 2022-04-26 NOTE — PROGRESS NOTES
"Chief Complaint  Rash (Pt states under both arms. Pt states been going on for 8 days)    Subjective          Letitia Hernandez presents to [facility]  History of Present Illness     Bilateral axillary rash.  Started first on the right and 1 to the left.  It is itching and slightly irritating but otherwise does not bother her.  No fever.  Started a first is 1 small red bump.  She stopped her deodorant.  She is had no new deodorants.  No no new soaps.  No new perfumes or other skin ointments.  She then placed something on her mother gave her and it made it burn.  She then got some of it on her face and there was a red spot on her face but then that went away.  Now she just has mild axillary symptoms.    Objective   Vital Signs:   /73   Pulse 105   Temp 97.3 °F (36.3 °C) (Infrared)   Resp 12   Ht 165.1 cm (65\")   Wt 60.3 kg (133 lb)   SpO2 99%   BMI 22.13 kg/m²     Physical Exam  Constitutional:       General: She is not in acute distress.     Appearance: She is not ill-appearing.   Cardiovascular:      Rate and Rhythm: Normal rate.   Pulmonary:      Effort: Pulmonary effort is normal.   Skin:     Comments: The bilateral axillary exam reveals some minimal erythema, mostly macular.  No evidence of hidradenitis suppurativa.  No evidence of abscess otherwise.  It is fairly confluent.  It is equally bilateral.  It does not fluoresce with a UV/Woods lamp.  No rash elsewhere.  Examination face is unremarkable.        Result Review :                 Assessment and Plan    Diagnoses and all orders for this visit:    1. Contact dermatitis, unspecified contact dermatitis type, unspecified trigger (Primary)    Other orders  -     triamcinolone (KENALOG) 0.025 % cream; Apply 1 application topically to the appropriate area as directed 2 (Two) Times a Day.  Dispense: 15 g; Refill: 0      Bilateral axillary dermatitis.  Likely not erythrasma.  Does not fluoresce with Woods lamp.  Unlikely to " have bilateral infection.  There is no evidence of hidradenitis suppurativa.  More likely a type of contact dermatitis.  Prescribing Kenalog 0.025% cream twice a day as needed.  If getting worse she is going to let us know and we will switch to clindamycin lotion.  She will otherwise follow-up with her primary care doctor as needed.    BMI is within normal parameters. No follow-up required.         Follow Up   No follow-ups on file.  Patient was given instructions and counseling regarding her condition or for health maintenance advice. Please see specific information pulled into the AVS if appropriate.

## 2022-05-10 ENCOUNTER — OFFICE VISIT (OUTPATIENT)
Dept: FAMILY MEDICINE CLINIC | Facility: CLINIC | Age: 27
End: 2022-05-10

## 2022-05-10 VITALS
BODY MASS INDEX: 21.36 KG/M2 | OXYGEN SATURATION: 98 % | HEIGHT: 65 IN | DIASTOLIC BLOOD PRESSURE: 72 MMHG | RESPIRATION RATE: 12 BRPM | TEMPERATURE: 97.5 F | HEART RATE: 93 BPM | SYSTOLIC BLOOD PRESSURE: 113 MMHG | WEIGHT: 128.2 LBS

## 2022-05-10 DIAGNOSIS — L50.9 HIVES: Primary | ICD-10-CM

## 2022-05-10 PROCEDURE — 99214 OFFICE O/P EST MOD 30 MIN: CPT | Performed by: NURSE PRACTITIONER

## 2022-05-10 RX ORDER — METHYLPREDNISOLONE 4 MG/1
TABLET ORAL
Qty: 21 TABLET | Refills: 0 | Status: SHIPPED | OUTPATIENT
Start: 2022-05-10 | End: 2022-05-11 | Stop reason: SDDI

## 2022-05-10 NOTE — PATIENT INSTRUCTIONS
Discharge instructions    You have symptoms of a systemic allergic reaction presently mild and looks like it is resolving    Should you have increased itching  Or hives recur  Benadryl 25 to 50 mg 4 times a day until better do not drive with Benadryl  Start  Medrol for increased uncomfortable itching or hives  First day dose at once  Then spread out throughout the day the following day    Pepcid as addition only 40 mg twice daily x7 days    If severe rash, shortness of breath increased facial swelling fever  Worsening symptoms emergency room    Recurrent allergy rash send us a message so we can refer you to allergy and asthma     If your hives return please send me a message and update your condition

## 2022-05-11 ENCOUNTER — OFFICE VISIT (OUTPATIENT)
Dept: FAMILY MEDICINE CLINIC | Facility: CLINIC | Age: 27
End: 2022-05-11

## 2022-05-11 VITALS
WEIGHT: 129.6 LBS | SYSTOLIC BLOOD PRESSURE: 103 MMHG | HEIGHT: 65 IN | BODY MASS INDEX: 21.59 KG/M2 | DIASTOLIC BLOOD PRESSURE: 67 MMHG | OXYGEN SATURATION: 99 % | HEART RATE: 86 BPM | TEMPERATURE: 97.7 F

## 2022-05-11 DIAGNOSIS — T78.40XD ALLERGIC REACTION, SUBSEQUENT ENCOUNTER: Primary | ICD-10-CM

## 2022-05-11 PROCEDURE — 99213 OFFICE O/P EST LOW 20 MIN: CPT | Performed by: NURSE PRACTITIONER

## 2022-05-11 PROCEDURE — 96372 THER/PROPH/DIAG INJ SC/IM: CPT | Performed by: NURSE PRACTITIONER

## 2022-05-11 RX ORDER — TRIAMCINOLONE ACETONIDE 40 MG/ML
40 INJECTION, SUSPENSION INTRA-ARTICULAR; INTRAMUSCULAR ONCE
Status: COMPLETED | OUTPATIENT
Start: 2022-05-11 | End: 2022-05-11

## 2022-05-11 RX ADMIN — TRIAMCINOLONE ACETONIDE 40 MG: 40 INJECTION, SUSPENSION INTRA-ARTICULAR; INTRAMUSCULAR at 15:38

## 2022-05-11 NOTE — PROGRESS NOTES
"Chief Complaint  eye swelling (C/o both eyes swelling since yesterday)    Subjective          Letitia Hernandez presents to [facility]  History of Present Illness new patient to me.  She is here for bilateral upper eyelid swelling that she woke up with yesterday morning.  She was here for this same thing yesterday and given a Medrol Dosepak which she did not take.  Reports that she slept in  Mascara 3-4 days and when she washed off the mascara with normal water she noticed the swelling.  Eyes are not itchy or runny.  Might of been a little bit more crusty in her eyelash area but not significant.  No vision changes or eye pain.  No changes to foods, detergents, personal care.  Has not changed mascara.  She has something similar to this a long time ago and had to be seen by an ophthalmologist and was given topical treatment, not sure what it was.    She does remember that just prior to this she had been at a restaurant and had a new drink that she had not had in the past but has had all ingredients by themselves previously.  She did brushed against a plant there and developed a rash on her leg after taking off her jeans.  Does not know if she had some sort of oil on her jeans that inoculated her leg.  The rash resolved right away.      Objective   Vital Signs:  /67   Pulse 86   Temp 97.7 °F (36.5 °C)   Ht 165.1 cm (65\")   Wt 58.8 kg (129 lb 9.6 oz)   SpO2 99%   BMI 21.57 kg/m²     BMI is within normal parameters. No follow-up required.      Physical Exam  Vitals and nursing note reviewed.   Constitutional:       General: She is not in acute distress.     Appearance: She is well-developed. She is not ill-appearing or diaphoretic.   HENT:      Head: Normocephalic and atraumatic.   Eyes:      General: Gaze aligned appropriately. No scleral icterus.        Right eye: No foreign body, discharge or hordeolum.         Left eye: No foreign body, discharge or hordeolum.      Extraocular " Movements: Extraocular movements intact.      Right eye: No nystagmus.      Left eye: No nystagmus.      Conjunctiva/sclera: Conjunctivae normal.      Right eye: Right conjunctiva is not injected. No chemosis, exudate or hemorrhage.     Left eye: Left conjunctiva is not injected. No chemosis, exudate or hemorrhage.     Pupils: Pupils are equal, round, and reactive to light.      Comments: Bilateral upper eyelids mildly erythematous and swollen, no discharge   Cardiovascular:      Rate and Rhythm: Normal rate and regular rhythm.      Heart sounds: Normal heart sounds.   Pulmonary:      Effort: Pulmonary effort is normal.      Breath sounds: Normal breath sounds.   Abdominal:      General: Bowel sounds are normal.      Palpations: Abdomen is soft.      Tenderness: There is no abdominal tenderness.   Musculoskeletal:         General: No deformity.      Comments: Gait smooth and steady   Skin:     General: Skin is warm and dry.   Neurological:      Mental Status: She is alert and oriented to person, place, and time.        Result Review :                 Assessment and Plan    Diagnoses and all orders for this visit:    1. Allergic reaction, subsequent encounter (Primary)  -     triamcinolone acetonide (KENALOG-40) injection 40 mg    This really appears to be some sort of allergic reaction.  Not sure what caused it.  She may have rubbed her eyes after plant which she apparently was allergic to.  Or may be had some sort of reaction to the mascara.  I recommend she contact her optometrist to see if she can be evaluated today.  In the meantime I will give her Kenalog to see if that helps her symptoms.  If she worsens or has any changes in her vision or eye pain she needs to seek emergent evaluation.  We will have her continue allergy control.  Avoid any make-up around her eyes until evaluated and symptoms resolved.         Follow Up   Return if symptoms worsen or fail to improve.  Patient was given instructions and  counseling regarding her condition or for health maintenance advice. Please see specific information pulled into the AVS if appropriate.

## 2022-05-16 NOTE — PROGRESS NOTES
"Chief Complaint  Rash (Pt states broke out shannon rash yesterday)    Subjective          Letitia Hernandez presents to [facility]  Patient complains of sudden onset red raised rash mostly her legs but arms, itchy moving around, nothing helping took elbows little better now she took some pictures of the rash on her leg no difficulty swallowing chest pain shortness of breath no fever no chills no new detergents soaps etc. to explain.  No recurrent hives or allergy rash symptoms mild to moderate.    Rash        Objective   Vital Signs:  /72   Pulse 93   Temp 97.5 °F (36.4 °C) (Infrared)   Resp 12   Ht 165.1 cm (65\")   Wt 58.2 kg (128 lb 3.2 oz)   SpO2 98%   BMI 21.33 kg/m²     BMI is within normal parameters. No follow-up required.      Physical Exam  Vitals reviewed.   Constitutional:       Appearance: Normal appearance. She is well-developed.      Comments: Pleasant appears well   HENT:      Head: Normocephalic.      Nose: Nose normal.   Eyes:      General: No scleral icterus.     Conjunctiva/sclera: Conjunctivae normal.      Pupils: Pupils are equal, round, and reactive to light.   Neck:      Thyroid: No thyromegaly.      Vascular: No JVD.   Cardiovascular:      Rate and Rhythm: Normal rate and regular rhythm.      Heart sounds: Normal heart sounds. No murmur heard.    No friction rub. No gallop.   Pulmonary:      Effort: Pulmonary effort is normal. No respiratory distress.      Breath sounds: Normal breath sounds. No stridor. No wheezing or rales.   Abdominal:      General: Bowel sounds are normal. There is no distension.      Palpations: Abdomen is soft.      Tenderness: There is no abdominal tenderness.      Comments: No hepatosplenomegaly, no ascites,   Musculoskeletal:         General: No tenderness.      Cervical back: Neck supple.   Lymphadenopathy:      Cervical: No cervical adenopathy.   Skin:     General: Skin is warm and dry.      Findings: No erythema or rash.      " Comments: Few scattered lacy like red raised hives legs few on her arms mild presently no localized angioedema no deep red hot areas   Neurological:      Mental Status: She is alert and oriented to person, place, and time.      Deep Tendon Reflexes: Reflexes are normal and symmetric.   Psychiatric:         Behavior: Behavior normal.         Thought Content: Thought content normal.         Judgment: Judgment normal.        Result Review :                 Assessment and Plan    Diagnoses and all orders for this visit:    1. Hives (Primary)    Other orders  -     Discontinue: methylPREDNISolone (MEDROL) 4 MG dose pack; Take as directed on package instructions.  Dispense: 21 tablet; Refill: 0             Follow Up   No follow-ups on file.  Patient was given instructions and counseling regarding her condition or for health maintenance advice. Please see specific information pulled into the AVS if appropriate.   Discharge instructions    You have symptoms of a systemic allergic reaction presently mild and looks like it is resolving    Should you have increased itching  Or hives recur  Benadryl 25 to 50 mg 4 times a day until better do not drive with Benadryl  Start  Medrol for increased uncomfortable itching or hives  First day dose at once  Then spread out throughout the day the following day    Pepcid as addition only 40 mg twice daily x7 days    If severe rash, shortness of breath increased facial swelling fever  Worsening symptoms emergency room    Recurrent allergy rash send us a message so we can refer you to allergy and asthma     If your hives return please send me a message and update your condition

## 2022-07-19 ENCOUNTER — TELEPHONE (OUTPATIENT)
Dept: FAMILY MEDICINE CLINIC | Facility: CLINIC | Age: 27
End: 2022-07-19

## 2022-07-19 NOTE — TELEPHONE ENCOUNTER
Caller: Letitia Hernandez    Relationship: Self    Best call back number:     What medication are you requesting:     What are your current symptoms: FREQUENT URINATION, NOT A LOT OF URINE, SOME BLOOD WHEN WIPING    How long have you been experiencing symptoms: 1 DAY    Have you had these symptoms before:    [x] Yes  [] No    Have you been treated for these symptoms before:   [x] Yes  [] No    If a prescription is needed, what is your preferred pharmacy and phone number:  [facility]  [address] [place] [address]  [phone]    Additional notes: PATIENT IS CALLING IN WITH THE SYMPTOMS LISTED ABOVE. SHE SAYS THAT SHE WANTS TO KNOW IF ANY MEDICATIONS CAN BE CALLED IN FOR HER.  SHE SAYS THAT SHE TOOK A PLAN B PILL YESTERDAY SO SHE IS NOT SURE IF THAT WILL INTERACT WITH ANY MEDICATIONS THAT ARE PRESCRIBED FOR HER.

## 2022-07-20 ENCOUNTER — OFFICE VISIT (OUTPATIENT)
Dept: FAMILY MEDICINE CLINIC | Facility: CLINIC | Age: 27
End: 2022-07-20

## 2022-07-20 VITALS
OXYGEN SATURATION: 100 % | HEIGHT: 65 IN | SYSTOLIC BLOOD PRESSURE: 114 MMHG | WEIGHT: 125 LBS | HEART RATE: 82 BPM | BODY MASS INDEX: 20.83 KG/M2 | DIASTOLIC BLOOD PRESSURE: 72 MMHG | TEMPERATURE: 97.3 F

## 2022-07-20 DIAGNOSIS — R35.0 URINARY FREQUENCY: Primary | ICD-10-CM

## 2022-07-20 LAB
BILIRUB BLD-MCNC: NEGATIVE MG/DL
CLARITY, POC: CLEAR
COLOR UR: YELLOW
EXPIRATION DATE: ABNORMAL
GLUCOSE UR STRIP-MCNC: NEGATIVE MG/DL
KETONES UR QL: NEGATIVE
LEUKOCYTE EST, POC: ABNORMAL
Lab: ABNORMAL
NITRITE UR-MCNC: NEGATIVE MG/ML
PH UR: 6 [PH] (ref 5–8)
PROT UR STRIP-MCNC: NEGATIVE MG/DL
RBC # UR STRIP: NEGATIVE /UL
SP GR UR: 1.03 (ref 1–1.03)
UROBILINOGEN UR QL: NORMAL

## 2022-07-20 PROCEDURE — 99213 OFFICE O/P EST LOW 20 MIN: CPT | Performed by: NURSE PRACTITIONER

## 2022-07-20 PROCEDURE — 81003 URINALYSIS AUTO W/O SCOPE: CPT | Performed by: NURSE PRACTITIONER

## 2022-07-20 NOTE — PROGRESS NOTES
"Chief Complaint  Urinary Retention (C/o urine retention )    Subjective        Letitia Hernandez presents to [facility]  History of Present Illness new patient to me.  Here for urinary  frequency and bladder spasms at end of urination since Monday-IC on Friday.  Has been drinking extra water since and feeling better.  Now has minimal symptoms and just feels some slight bladder spasm at the end of micturition.  No fever, chills.  Otherwise feels well.  No unusual vaginal discharge or bleeding.  Denies risk for STI.  Denies pregnancy risk.       Objective   Vital Signs:  /72   Pulse 82   Temp 97.3 °F (36.3 °C)   Ht 165.1 cm (65\")   Wt 56.7 kg (125 lb)   SpO2 100%   BMI 20.80 kg/m²   Estimated body mass index is 20.8 kg/m² as calculated from the following:    Height as of this encounter: 165.1 cm (65\").    Weight as of this encounter: 56.7 kg (125 lb).    BMI is within normal parameters. No other follow-up for BMI required.      Physical Exam  Vitals and nursing note reviewed.   Constitutional:       General: She is not in acute distress.     Appearance: She is well-developed. She is not ill-appearing or diaphoretic.   HENT:      Head: Normocephalic and atraumatic.   Eyes:      General:         Right eye: No discharge.         Left eye: No discharge.      Conjunctiva/sclera: Conjunctivae normal.   Pulmonary:      Effort: Pulmonary effort is normal.   Musculoskeletal:         General: No deformity.      Comments: Gait smooth and steady   Skin:     General: Skin is warm and dry.   Neurological:      General: No focal deficit present.      Mental Status: She is alert and oriented to person, place, and time.   Psychiatric:         Mood and Affect: Mood normal.         Behavior: Behavior normal.        Result Review :                Assessment and Plan   Diagnoses and all orders for this visit:    1. Urinary frequency (Primary)    UA in office was not significant for indication for " UTI.  Recommend adequate hydration.  Since her symptoms are improving with extra hydration we discussed waiting and watching.  We will send for culture.  She will let me know on Friday if her symptoms are better, worse, or staying the same since her culture will likely not be back before weekend.  If needed we will send antibiotics for presumptive UTI pending culture results.  She requests Diflucan as she typically gets vaginal yeast after antibiotics.             Follow Up   Return if symptoms worsen or fail to improve.  Patient was given instructions and counseling regarding her condition or for health maintenance advice. Please see specific information pulled into the AVS if appropriate.

## 2022-07-25 LAB
BACTERIA UR CULT: ABNORMAL
BACTERIA UR CULT: ABNORMAL
OTHER ANTIBIOTIC SUSC ISLT: ABNORMAL

## 2022-07-25 RX ORDER — NITROFURANTOIN 25; 75 MG/1; MG/1
100 CAPSULE ORAL 2 TIMES DAILY
Qty: 14 CAPSULE | Refills: 0 | Status: SHIPPED | OUTPATIENT
Start: 2022-07-25 | End: 2022-08-15

## 2022-09-01 NOTE — TELEPHONE ENCOUNTER
Rx Refill Note  Requested Prescriptions     Pending Prescriptions Disp Refills   • ondansetron ODT (ZOFRAN-ODT) 4 MG disintegrating tablet 20 tablet 0     Sig: Place 1 tablet on the tongue Every 8 (Eight) Hours As Needed for Nausea or Vomiting.      Last office visit with prescribing clinician: 3/3/2022      Next office visit with prescribing clinician: 1/26/2023            Julianne Zuluaga LPN  09/01/22, 14:55 EDT

## 2022-09-02 RX ORDER — ONDANSETRON 4 MG/1
4 TABLET, ORALLY DISINTEGRATING ORAL EVERY 8 HOURS PRN
Qty: 20 TABLET | Refills: 0 | Status: SHIPPED | OUTPATIENT
Start: 2022-09-02 | End: 2023-02-21 | Stop reason: SDUPTHER

## 2023-01-10 ENCOUNTER — TELEPHONE (OUTPATIENT)
Dept: CARDIOLOGY | Facility: CLINIC | Age: 28
End: 2023-01-10

## 2023-01-10 NOTE — TELEPHONE ENCOUNTER
We did not note a murmur on exam last visit or in 2019.  Would have her schedule an appointment to see Dr. Matthews or his nurse practitioner, Kathie Solitario.  Looks like Kathie has opening this week and next week.

## 2023-01-10 NOTE — TELEPHONE ENCOUNTER
LOV: 2/2 with Sheeren Finch    Please let me know how you would like to proceed.    Thank you,  Angelita Palafox, RN  Triage Nurse G

## 2023-01-10 NOTE — TELEPHONE ENCOUNTER
Caller: MILES    Relationship to patient: SELF    Best call back number: [phone]    Patient is needing: PT WENT TO [place] THIS MORNING FOR HER JOB AND THE DOCTOR LISTENED TO HER HEART AND TOLD HER THAT SHE HAD A HEART MURMUR AND WAS TOLD TO ADVISE HER CARDIOLOGIST. PT IS NOT SURE IF THAT WAS ON HER RECORDS BEFORE BUT WANTED TO LET US KNOW. AND ALSO NOT SURE IF SHE NEEDED TO MAKE AN APT OR SHOULD BE CONCERNED.     IF YOU HAVE ANY QUESTIONS PLEASE CONTACT PT.     THANK YOU.

## 2023-01-10 NOTE — TELEPHONE ENCOUNTER
Left voicemail for Letitia Hernandez requesting callback.    Thank you,  Angelita Palafox RN  Triage Nurse G

## 2023-01-11 NOTE — TELEPHONE ENCOUNTER
I called and was able to schedule pt an appt to see LEX Forde on 2/10/23.    Thank you,    Prerna Shoemaker RN  [place] [facility] [place]  01/11/23 11:34 EST

## 2023-02-10 ENCOUNTER — OFFICE VISIT (OUTPATIENT)
Dept: CARDIOLOGY | Facility: CLINIC | Age: 28
End: 2023-02-10
Payer: MEDICAID

## 2023-02-10 VITALS
DIASTOLIC BLOOD PRESSURE: 70 MMHG | WEIGHT: 120.2 LBS | HEART RATE: 95 BPM | HEIGHT: 65 IN | SYSTOLIC BLOOD PRESSURE: 100 MMHG | OXYGEN SATURATION: 100 % | BODY MASS INDEX: 20.03 KG/M2

## 2023-02-10 DIAGNOSIS — R01.1 HEART MURMUR: Primary | ICD-10-CM

## 2023-02-10 PROCEDURE — 99213 OFFICE O/P EST LOW 20 MIN: CPT | Performed by: NURSE PRACTITIONER

## 2023-02-10 PROCEDURE — 93000 ELECTROCARDIOGRAM COMPLETE: CPT | Performed by: NURSE PRACTITIONER

## 2023-02-10 NOTE — PROGRESS NOTES
"    CARDIOLOGY        Patient Name: Letitia Hernandez  :1995  Age: 27 y.o.  Primary Cardiologist: Migue Matthews MD  Encounter Provider:  LEX Fabian    Date of Service: 02/10/23            CHIEF COMPLAINT / REASON FOR OFFICE VISIT     Heart Murmur      HISTORY OF PRESENT ILLNESS       HPI  Letitia Hernandez is a 27 y.o. female who presents today for annual evaluation.     Pt has a  history significant for near syncope.    Patient reports today stating that she had a physical exam in a [facility] facility and was told that she has a heart murmur.  She has never been told that she has a heart murmur before so she was encouraged to come to the office for further evaluation.  Patient reports that she has been asymptomatic over the past several months.  Denying chest pain, dyspnea at rest or with exertion, palpitations, lightheadedness, edema, fatigue.  She does continue to have infrequent episodes of near syncope, most recent was 4 months ago.  These have been worked up in the past with no clear etiology.      The following portions of the patient's history were reviewed and updated as appropriate: allergies, current medications, past family history, past medical history, past social history, past surgical history and problem list.      VITAL SIGNS     Visit Vitals  /70 (BP Location: Left arm, Patient Position: Sitting, Cuff Size: Adult)   Pulse 95   Ht 165.1 cm (65\")   Wt 54.5 kg (120 lb 3.2 oz)   SpO2 100%   BMI 20.00 kg/m²         Wt Readings from Last 3 Encounters:   02/10/23 54.5 kg (120 lb 3.2 oz)   08/15/22 56.2 kg (124 lb)   22 56.7 kg (125 lb)     Body mass index is 20 kg/m².      REVIEW OF SYSTEMS   Review of Systems   Constitutional: Negative for chills, fever, weight gain and weight loss.   Cardiovascular: Negative for leg swelling.   Respiratory: Negative for cough, snoring and wheezing.    Hematologic/Lymphatic: Negative for bleeding problem. Does not bruise/bleed easily. "   Skin: Negative for color change.   Musculoskeletal: Negative for falls, joint pain and myalgias.   Gastrointestinal: Negative for melena.   Genitourinary: Negative for hematuria.   Neurological: Negative for excessive daytime sleepiness.   Psychiatric/Behavioral: Negative for depression. The patient is not nervous/anxious.            PHYSICAL EXAMINATION     Vitals and nursing note reviewed.   Constitutional:       Appearance: Normal appearance. Well-developed.   Eyes:      Conjunctiva/sclera: Conjunctivae normal.   Neck:      Vascular: No carotid bruit.   Pulmonary:      Breath sounds: Normal breath sounds.   Cardiovascular:      Normal rate. Regular rhythm. Normal S1 with normal intensity. Normal S2 with normal intensity.      Murmurs: There is no murmur.      No gallop. No click. No rub.   Edema:     Peripheral edema absent.   Musculoskeletal: Normal range of motion. Skin:     General: Skin is warm and dry.   Neurological:      Mental Status: Alert and oriented to person, place, and time.      GCS: GCS eye subscore is 4. GCS verbal subscore is 5. GCS motor subscore is 6.   Psychiatric:         Speech: Speech normal.         Behavior: Behavior normal.         Thought Content: Thought content normal.         Judgment: Judgment normal.           REVIEWED DATA       ECG 12 Lead    Date/Time: 2/10/2023 2:06 PM  Performed by: Emely Solitario APRN  Authorized by: Emely Solitario APRN   Comparison: compared with previous ECG from 2/2/2022  Similar to previous ECG  Rhythm: sinus rhythm  Rate: normal  BPM: 95  Conduction: conduction normal  ST Segments: ST segments normal  T Waves: T waves normal  QRS axis: normal    Clinical impression: normal ECG            Cardiac Procedures:  1. Echocardiogram 11/7/2019.  LVEF 56%.  LV diastolic function normal.  No significant valvular stenosis or insufficiency.  2. ZIO monitor 2/25/2022.  Normal monitor study.  3. Carotid ultrasound 3/23/2022.  Normal bilaterally.      BUN    Date Value Ref Range Status   01/04/2022 11 6 - 20 mg/dL Final     Creatinine   Date Value Ref Range Status   01/04/2022 0.60 0.57 - 1.00 mg/dL Final     Potassium   Date Value Ref Range Status   01/04/2022 3.7 3.5 - 5.2 mmol/L Final     ALT (SGPT)   Date Value Ref Range Status   01/04/2022 20 1 - 33 U/L Final     AST (SGOT)   Date Value Ref Range Status   01/04/2022 19 1 - 32 U/L Final           ASSESSMENT & PLAN     Diagnoses and all orders for this visit:    1. Heart murmur (Primary)  · Patient was told that at [facility] physical exam they heard a heart murmur that required further evaluation.  · I do not appreciate a murmur on exam in clinic today.  · Patient was educated that sometimes murmurs can be transient and that these are typically benign.  · Normal structure and function of heart valves on echocardiogram in 2019  · Patient would like to pursue further evaluation, I recommend echocardiogram.  -     Adult Transthoracic Echo Complete W/ Cont if Necessary Per Protocol; Future          Return in about 1 year (around 2/10/2024) for Dr. Matthews- Routine.    Future Appointments       Provider Department Center    3/31/2023 1:15 PM [facility] OUTPATIENT ECHOCARDIOGRAPHY [facility]    2/12/2024 2:00 PM Migue Matthews MD [facility] CARDIOLOGY [facility]                MEDICATIONS         Discharge Medications          Accurate as of February 10, 2023  3:10 PM. If you have any questions, ask your nurse or doctor.            Continue These Medications      Instructions Start Date   cetirizine 10 MG tablet  Commonly known as: zyrTEC   10 mg, Oral, Daily      fluticasone 50 MCG/ACT nasal spray  Commonly known as: FLONASE   Shake liquid and use 2 sprays in each nostril daily.      multivitamin with minerals tablet tablet  Generic drug: multivitamin with minerals   Oral      omeprazole 40 MG capsule  Commonly known as: priLOSEC   40 mg, Oral, Daily      ondansetron ODT  4 MG disintegrating tablet  Commonly known as: ZOFRAN-ODT   4 mg, Translingual, Every 8 Hours PRN      Vitamin D3 1.25 MG (07052 UT) capsule   50,000 Units, Oral, Weekly                 **Dragon Disclaimer:   Much of this encounter note is an electronic transcription/translation of spoken language to printed text. The electronic translation of spoken language may permit erroneous, or at times, nonsensical words or phrases to be inadvertently transcribed. Although I have reviewed the note for such errors, some may still exist.

## 2023-02-15 ENCOUNTER — TELEPHONE (OUTPATIENT)
Dept: FAMILY MEDICINE CLINIC | Facility: CLINIC | Age: 28
End: 2023-02-15

## 2023-02-15 NOTE — TELEPHONE ENCOUNTER
CALLED TO CONFIRM PHYSICAL APPT WITH DR MORALES FOR 2/21.     HUB PLEASE TRANSFER PT CALL BACK TO MULUGETA IN OFFICE. THANK YOU.

## 2023-02-15 NOTE — TELEPHONE ENCOUNTER
Caller: Letitia Hernandez    Relationship to patient: Self    Best call back number: [phone]    Chief complaint: PHYSICAL    Type of visit: PHYSICAL    Requested date: BEFORE DR MORALES LEAVES     Additional notes: PATIENT WOULD LIKE TO DO A PHYSICAL WITH DR MORALES BEFORE SHE LEAVES, PLEASE ADVISE IF SHE CAN BE SCHEDULED IN.

## 2023-02-21 ENCOUNTER — OFFICE VISIT (OUTPATIENT)
Dept: FAMILY MEDICINE CLINIC | Facility: CLINIC | Age: 28
End: 2023-02-21
Payer: MEDICAID

## 2023-02-21 VITALS
HEART RATE: 82 BPM | OXYGEN SATURATION: 100 % | DIASTOLIC BLOOD PRESSURE: 64 MMHG | BODY MASS INDEX: 19.63 KG/M2 | WEIGHT: 117.8 LBS | SYSTOLIC BLOOD PRESSURE: 128 MMHG | HEIGHT: 65 IN | RESPIRATION RATE: 17 BRPM | TEMPERATURE: 98.6 F

## 2023-02-21 DIAGNOSIS — E55.9 VITAMIN D DEFICIENCY: ICD-10-CM

## 2023-02-21 DIAGNOSIS — R55 PRE-SYNCOPE: ICD-10-CM

## 2023-02-21 DIAGNOSIS — K21.9 GASTROESOPHAGEAL REFLUX DISEASE, UNSPECIFIED WHETHER ESOPHAGITIS PRESENT: ICD-10-CM

## 2023-02-21 DIAGNOSIS — R51.9 ACUTE NONINTRACTABLE HEADACHE, UNSPECIFIED HEADACHE TYPE: ICD-10-CM

## 2023-02-21 DIAGNOSIS — N92.1 MENORRHAGIA WITH IRREGULAR CYCLE: ICD-10-CM

## 2023-02-21 DIAGNOSIS — Z00.00 ANNUAL PHYSICAL EXAM: Primary | ICD-10-CM

## 2023-02-21 PROBLEM — J30.9 ALLERGIC RHINITIS: Status: ACTIVE | Noted: 2023-02-21

## 2023-02-21 PROCEDURE — 2014F MENTAL STATUS ASSESS: CPT | Performed by: FAMILY MEDICINE

## 2023-02-21 PROCEDURE — 3008F BODY MASS INDEX DOCD: CPT | Performed by: FAMILY MEDICINE

## 2023-02-21 PROCEDURE — 99395 PREV VISIT EST AGE 18-39: CPT | Performed by: FAMILY MEDICINE

## 2023-02-21 RX ORDER — ONDANSETRON 4 MG/1
4 TABLET, ORALLY DISINTEGRATING ORAL EVERY 8 HOURS PRN
Qty: 20 TABLET | Refills: 0 | Status: SHIPPED | OUTPATIENT
Start: 2023-02-21

## 2023-02-21 RX ORDER — OMEPRAZOLE 40 MG/1
40 CAPSULE, DELAYED RELEASE ORAL DAILY
Qty: 90 CAPSULE | Refills: 3 | Status: SHIPPED | OUTPATIENT
Start: 2023-02-21

## 2023-02-22 LAB
25(OH)D3+25(OH)D2 SERPL-MCNC: 20.4 NG/ML (ref 30–100)
BASOPHILS # BLD AUTO: 0 X10E3/UL (ref 0–0.2)
BASOPHILS NFR BLD AUTO: 1 %
EOSINOPHIL # BLD AUTO: 0.2 X10E3/UL (ref 0–0.4)
EOSINOPHIL NFR BLD AUTO: 3 %
ERYTHROCYTE [DISTWIDTH] IN BLOOD BY AUTOMATED COUNT: 12.4 % (ref 11.7–15.4)
HCT VFR BLD AUTO: 36.5 % (ref 34–46.6)
HGB BLD-MCNC: 12.5 G/DL (ref 11.1–15.9)
IMM GRANULOCYTES # BLD AUTO: 0 X10E3/UL (ref 0–0.1)
IMM GRANULOCYTES NFR BLD AUTO: 0 %
LYMPHOCYTES # BLD AUTO: 2.7 X10E3/UL (ref 0.7–3.1)
LYMPHOCYTES NFR BLD AUTO: 47 %
MCH RBC QN AUTO: 31.1 PG (ref 26.6–33)
MCHC RBC AUTO-ENTMCNC: 34.2 G/DL (ref 31.5–35.7)
MCV RBC AUTO: 91 FL (ref 79–97)
MONOCYTES # BLD AUTO: 0.4 X10E3/UL (ref 0.1–0.9)
MONOCYTES NFR BLD AUTO: 7 %
NEUTROPHILS # BLD AUTO: 2.4 X10E3/UL (ref 1.4–7)
NEUTROPHILS NFR BLD AUTO: 42 %
PLATELET # BLD AUTO: 202 X10E3/UL (ref 150–450)
RBC # BLD AUTO: 4.02 X10E6/UL (ref 3.77–5.28)
TSH SERPL DL<=0.005 MIU/L-ACNC: 1.31 UIU/ML (ref 0.45–4.5)
WBC # BLD AUTO: 5.8 X10E3/UL (ref 3.4–10.8)

## 2023-02-24 ENCOUNTER — TELEPHONE (OUTPATIENT)
Dept: FAMILY MEDICINE CLINIC | Facility: CLINIC | Age: 28
End: 2023-02-24

## 2023-02-24 NOTE — TELEPHONE ENCOUNTER
Caller: Letitia Hernandez    Relationship: Self    Best call back number: [phone]    What medication are you requesting: SOMETHING FOR CLAUSTROPHOBIA    How long have you been experiencing symptoms: HAVING MRI MARCH 15    Have you had these symptoms before:    [] Yes  [] No    Have you been treated for these symptoms before:   [x] Yes  [] No    If a prescription is needed, what is your preferred pharmacy and phone number:    [id number] [address] AT [facility] [address] - [phone] [facility] [phone] FX      Additional notes: HAS MRI SCHEDULED ON MARCH 15, WANTS TO MAKE SURE THIS IS SOON ENOUGH. ALSO IS CLAUSTROPHOBIC AND WANTED TO KNOW IF SHE CAN HAVE ANY MEDICATION TO HELP WITH THIS.

## 2023-02-27 DIAGNOSIS — F40.240 CLAUSTROPHOBIA: Primary | ICD-10-CM

## 2023-02-27 RX ORDER — DIAZEPAM 2 MG/1
1-2 TABLET ORAL ONCE AS NEEDED
Qty: 1 TABLET | Refills: 0 | Status: SHIPPED | OUTPATIENT
Start: 2023-02-27 | End: 2023-03-17

## 2023-02-27 NOTE — TELEPHONE ENCOUNTER
I sent in a low dose valium. She could try a half of a tablet, to take after she arrives for imaging and the she should not drive herself home.

## 2023-03-13 ENCOUNTER — OFFICE VISIT (OUTPATIENT)
Dept: FAMILY MEDICINE CLINIC | Facility: CLINIC | Age: 28
End: 2023-03-13
Payer: MEDICAID

## 2023-03-13 VITALS
HEIGHT: 65 IN | WEIGHT: 117.5 LBS | HEART RATE: 88 BPM | DIASTOLIC BLOOD PRESSURE: 75 MMHG | OXYGEN SATURATION: 98 % | BODY MASS INDEX: 19.58 KG/M2 | TEMPERATURE: 97.3 F | SYSTOLIC BLOOD PRESSURE: 124 MMHG

## 2023-03-13 DIAGNOSIS — J06.9 VIRAL URI: Primary | ICD-10-CM

## 2023-03-13 DIAGNOSIS — R68.83 CHILLS: ICD-10-CM

## 2023-03-13 LAB
EXPIRATION DATE: NORMAL
FLUAV AG UPPER RESP QL IA.RAPID: NOT DETECTED
FLUBV AG UPPER RESP QL IA.RAPID: NOT DETECTED
INTERNAL CONTROL: NORMAL
Lab: NORMAL
SARS-COV-2 AG UPPER RESP QL IA.RAPID: NOT DETECTED

## 2023-03-13 PROCEDURE — 87428 SARSCOV & INF VIR A&B AG IA: CPT | Performed by: FAMILY MEDICINE

## 2023-03-13 PROCEDURE — 99213 OFFICE O/P EST LOW 20 MIN: CPT | Performed by: FAMILY MEDICINE

## 2023-03-13 NOTE — PROGRESS NOTES
"Chief Complaint  Sore Throat (Left side of neck feels swollen ), Dizziness, and Chills (Highest temp of 99 on saturday 3/11/23)    Subjective        Letitia Hernandez presents to [facility]  History of Present Illness    3 days of sore throat, little ear pressure, some nonspecific dizziness not vertigo, and feeling rundown with some chills for couple days.  She had some swollen glands she thinks on her neck.  She works at a  like facility.  Thinks she may have gotten sick there.  Some nausea but she has this in the past.  She has an MRI pending, ordered by her primary care provider who is not available today.  Previous syncopal episode reportedly.  None recently.      Objective   Vital Signs:  /75   Pulse 88   Temp 97.3 °F (36.3 °C) (Temporal)   Ht 165.1 cm (65\")   Wt 53.3 kg (117 lb 8 oz)   SpO2 98%   BMI 19.55 kg/m²   Estimated body mass index is 19.55 kg/m² as calculated from the following:    Height as of this encounter: 165.1 cm (65\").    Weight as of this encounter: 53.3 kg (117 lb 8 oz).       BMI is within normal parameters. No other follow-up for BMI required.      Physical Exam  Vitals and nursing note reviewed.   Constitutional:       General: She is not in acute distress.     Comments: No acute distress.  Nontoxic.   HENT:      Right Ear: Tympanic membrane, ear canal and external ear normal.      Left Ear: Tympanic membrane, ear canal and external ear normal.      Nose: Nose normal.      Mouth/Throat:      Pharynx: No oropharyngeal exudate or posterior oropharyngeal erythema.      Comments: Oral cavity unremarkable  Eyes:      General: No scleral icterus.        Right eye: No discharge.         Left eye: No discharge.      Conjunctiva/sclera: Conjunctivae normal.   Neck:      Comments: She has some swollen slightly tender left posterior chain lymphadenopathy  Cardiovascular:      Rate and Rhythm: Normal rate.   Pulmonary:      Effort: Pulmonary effort is " normal. No respiratory distress.      Breath sounds: Normal breath sounds. No stridor. No wheezing or rales.   Lymphadenopathy:      Cervical: Cervical adenopathy present.   Skin:     Findings: No rash.        Result Review :                   Assessment and Plan   Diagnoses and all orders for this visit:    1. Viral URI (Primary)    2. Chills  -     POCT SARS-CoV-2 Antigen ALMA + Flu      Probable non-COVID viral URI syndrome.  She has some left posterior chain lymphadenopathy with a slight sore throat.  And some mild constitutional symptoms.  At this time no indication for antibiotics.  With severe symptoms she is going to seek medical attention.  If not improved in 1 week I want her to see her primary care provider.  Differential diagnosis does include infectious mononucleosis.  Too early for testing.  Do consider if symptoms persist.           Follow Up   No follow-ups on file.  Patient was given instructions and counseling regarding her condition or for health maintenance advice. Please see specific information pulled into the AVS if appropriate.

## 2023-03-14 ENCOUNTER — TELEPHONE (OUTPATIENT)
Dept: FAMILY MEDICINE CLINIC | Facility: CLINIC | Age: 28
End: 2023-03-14
Payer: MEDICAID

## 2023-03-14 ENCOUNTER — HOSPITAL ENCOUNTER (OUTPATIENT)
Dept: MRI IMAGING | Facility: HOSPITAL | Age: 28
Discharge: HOME | End: 2023-03-14
Payer: MEDICAID

## 2023-03-14 ENCOUNTER — HOSPITAL ENCOUNTER (OUTPATIENT)
Dept: GENERAL RADIOLOGY | Facility: HOSPITAL | Age: 28
Discharge: HOME | End: 2023-03-14
Payer: MEDICAID

## 2023-03-14 DIAGNOSIS — R51.9 ACUTE NONINTRACTABLE HEADACHE, UNSPECIFIED HEADACHE TYPE: ICD-10-CM

## 2023-03-14 DIAGNOSIS — R55 PRE-SYNCOPE: ICD-10-CM

## 2023-03-14 DIAGNOSIS — R55 SYNCOPE, UNSPECIFIED SYNCOPE TYPE: ICD-10-CM

## 2023-03-14 PROCEDURE — 72050 X-RAY EXAM NECK SPINE 4/5VWS: CPT

## 2023-03-14 PROCEDURE — A9577 INJ MULTIHANCE: HCPCS | Performed by: FAMILY MEDICINE

## 2023-03-14 PROCEDURE — 0 GADOBENATE DIMEGLUMINE 529 MG/ML SOLUTION: Performed by: FAMILY MEDICINE

## 2023-03-14 PROCEDURE — 70553 MRI BRAIN STEM W/O & W/DYE: CPT

## 2023-03-14 RX ADMIN — GADOBENATE DIMEGLUMINE 10 ML: 529 INJECTION, SOLUTION INTRAVENOUS at 14:30

## 2023-03-14 NOTE — TELEPHONE ENCOUNTER
CALLED AND LVM FOR PT TO CONFIRM APPT WITH DR MORALES ON 3/21 AT 11:15 FOR FU.     HUB FAHEEM TO INFORM PT AND CONFIRM OR TRANSFER CALL TO MULUGETA IN OFFICE.

## 2023-03-16 ENCOUNTER — TELEPHONE (OUTPATIENT)
Dept: FAMILY MEDICINE CLINIC | Facility: CLINIC | Age: 28
End: 2023-03-16
Payer: MEDICAID

## 2023-03-16 NOTE — TELEPHONE ENCOUNTER
PT WOULD LIKE TO DO THE 12:15 TOMORROW WITH DR MORALES IF YOU WILL PLEASE PUT HER IN. I WILL CANCEL THE APPT FOR Tuesday WITH HER. THANK YOU.

## 2023-03-17 ENCOUNTER — OFFICE VISIT (OUTPATIENT)
Dept: FAMILY MEDICINE CLINIC | Facility: CLINIC | Age: 28
End: 2023-03-17
Payer: MEDICAID

## 2023-03-17 VITALS
OXYGEN SATURATION: 100 % | HEART RATE: 102 BPM | HEIGHT: 65 IN | TEMPERATURE: 99.3 F | WEIGHT: 116.8 LBS | BODY MASS INDEX: 19.46 KG/M2 | SYSTOLIC BLOOD PRESSURE: 128 MMHG | RESPIRATION RATE: 18 BRPM | DIASTOLIC BLOOD PRESSURE: 64 MMHG

## 2023-03-17 DIAGNOSIS — E23.7 PITUITARY ABNORMALITY: ICD-10-CM

## 2023-03-17 DIAGNOSIS — R55 POSTURAL DIZZINESS WITH PRESYNCOPE: Primary | ICD-10-CM

## 2023-03-17 DIAGNOSIS — R42 POSTURAL DIZZINESS WITH PRESYNCOPE: Primary | ICD-10-CM

## 2023-03-17 PROCEDURE — 99214 OFFICE O/P EST MOD 30 MIN: CPT | Performed by: FAMILY MEDICINE

## 2023-03-17 NOTE — PROGRESS NOTES
"Chief Complaint  Imaging Only (F/U)    Subjective        Letitia Hernandez presents to [facility]  History of Present Illness    Letitia Hernandez is a 27-year-old female who is following up for a couple of things. She was recently seen in the office by Dr. Newberry and diagnosed with a viral URI noting that she did have some left posterior chain lymphadenopathy and mild sore throat. She was determined to treat conservatively with symptom management and follow up with me in a week. It was too early to test for mono, but that was part of the differential. She is back a little early, but she also had an MRI of the brain on 03/14/2023. This was related to having orthostatic and dizziness episodes for going on greater than 5 years. The episodes have been shorter but more frequent and so we decided to get imaging. 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 She has had cervical spine imaging without abnormality and carotid artery dopplers which were bilaterally normal.    Objective   Vital Signs:  /64 (BP Location: Right arm, Patient Position: Sitting, Cuff Size: Adult)   Pulse 102   Temp 99.3 °F (37.4 °C) (Infrared) Comment: wearing hat  Resp 18   Ht 165.1 cm (65\")   Wt 53 kg (116 lb 12.8 oz)   SpO2 100%   BMI 19.44 kg/m²     BMI is within normal parameters. No other follow-up for BMI required.      Physical Exam  Vitals reviewed.   Constitutional:       General: She is not in acute distress.     Appearance: Normal appearance. She is normal weight. She is not ill-appearing or toxic-appearing.   Eyes:      General: No scleral icterus.     Conjunctiva/sclera: Conjunctivae normal.   Pulmonary:      Effort: Pulmonary effort is normal. No respiratory distress.   Neurological:      Mental Status: She is alert " and oriented to person, place, and time.   Psychiatric:         Behavior: Behavior normal.          Result Review :                    Assessment and Plan   Diagnoses and all orders for this visit:    1. Postural dizziness with presyncope (Primary)    2. Pituitary abnormality (HCC)        Lightheaded and positional presyncope  Happening more often at this time. We still don't have a good explanation. She had MRI that was overall reassuring.  Recommendations to follow up on pituitary labs so will refer to endocrine.     Will discuss with radiology best study for looking at the vertebrobasilar system.   Of note father is in office with pt today.   He reports he had similar episodes. Never identified cause. He had from childhood. Had a couple of falls off ladders related to passing out when looking up. Now he just avoids looking up. At a certain point he gets dizzy when raises his chin so he overall just avoids this position change.              Follow Up   No follow-ups on file.  Patient was given instructions and counseling regarding her condition or for health maintenance advice. Please see specific information pulled into the AVS if appropriate.       Transcribed from ambient dictation for Karyna Mcneal MD by Ro oCnnor.  03/17/23   13:47 EDT    Patient or patient representative verbalized consent to the visit recording.  I have personally performed the services described in this document as transcribed by the above individual, and it is both accurate and complete.

## 2023-03-20 ENCOUNTER — TELEPHONE (OUTPATIENT)
Dept: FAMILY MEDICINE CLINIC | Facility: CLINIC | Age: 28
End: 2023-03-20

## 2023-03-20 NOTE — TELEPHONE ENCOUNTER
Caller: Letitia Hernandez    Relationship: Self    Best call back number: [phone]    What is the best time to reach you: ANYTIME    Who are you requesting to speak with (clinical staff, provider,  specific staff member): MA      What was the call regarding: PATIENT IS CALLING IN SHE STATES THAT SHE HAS DEVELOPED A BAD SORE THROAT, IS GOING HORSE AND SHE IS FEELING IT IN HER CHEST, NOT SURE IF SHE NEEDS TO COME BACK IN OR IF DOCTOR CAN SEND MEDICATION.    Do you require a callback: YES

## 2023-03-23 ENCOUNTER — OFFICE VISIT (OUTPATIENT)
Dept: GASTROENTEROLOGY | Facility: CLINIC | Age: 28
End: 2023-03-23
Payer: MEDICAID

## 2023-03-23 ENCOUNTER — PREP FOR SURGERY (OUTPATIENT)
Dept: SURGERY | Facility: SURGERY CENTER | Age: 28
End: 2023-03-23
Payer: MEDICAID

## 2023-03-23 VITALS
OXYGEN SATURATION: 99 % | DIASTOLIC BLOOD PRESSURE: 72 MMHG | HEART RATE: 84 BPM | BODY MASS INDEX: 19.47 KG/M2 | TEMPERATURE: 97.6 F | SYSTOLIC BLOOD PRESSURE: 104 MMHG | HEIGHT: 65 IN | WEIGHT: 116.9 LBS

## 2023-03-23 DIAGNOSIS — R14.0 BLOATING: ICD-10-CM

## 2023-03-23 DIAGNOSIS — R10.13 EPIGASTRIC PAIN: ICD-10-CM

## 2023-03-23 DIAGNOSIS — R11.14 BILIOUS VOMITING WITH NAUSEA: ICD-10-CM

## 2023-03-23 DIAGNOSIS — K21.9 GASTROESOPHAGEAL REFLUX DISEASE, UNSPECIFIED WHETHER ESOPHAGITIS PRESENT: Primary | ICD-10-CM

## 2023-03-23 PROCEDURE — 1160F RVW MEDS BY RX/DR IN RCRD: CPT | Performed by: NURSE PRACTITIONER

## 2023-03-23 PROCEDURE — 1159F MED LIST DOCD IN RCRD: CPT | Performed by: NURSE PRACTITIONER

## 2023-03-23 PROCEDURE — 99214 OFFICE O/P EST MOD 30 MIN: CPT | Performed by: NURSE PRACTITIONER

## 2023-03-23 RX ORDER — SODIUM CHLORIDE 0.9 % (FLUSH) 0.9 %
10 SYRINGE (ML) INJECTION AS NEEDED
OUTPATIENT
Start: 2023-03-23

## 2023-03-23 RX ORDER — SODIUM CHLORIDE, SODIUM LACTATE, POTASSIUM CHLORIDE, CALCIUM CHLORIDE 600; 310; 30; 20 MG/100ML; MG/100ML; MG/100ML; MG/100ML
30 INJECTION, SOLUTION INTRAVENOUS CONTINUOUS PRN
OUTPATIENT
Start: 2023-03-23

## 2023-03-23 RX ORDER — SODIUM CHLORIDE 0.9 % (FLUSH) 0.9 %
3 SYRINGE (ML) INJECTION EVERY 12 HOURS SCHEDULED
OUTPATIENT
Start: 2023-03-23

## 2023-03-23 NOTE — PATIENT INSTRUCTIONS
Schedule EGD for further evaluation of symptoms.     Schedule gastric emptying study to assess for gastroparesis.     For GERD, follow antireflux precautions.  Recommend avoiding eating within 3 to 4 hours of bedtime.  Avoid foods that can trigger symptoms which may include citrus fruits, spicy foods, tomatoes and red sauces, chocolate, coffee/tea, caffeinated or carbonated beverages, alcohol.

## 2023-03-23 NOTE — PROGRESS NOTES
"Chief Complaint   Patient presents with   • GI Problem         History of Present Illness  Patient is a 27-year-old female who presents today for evaluation.  She was referred for GERD.    Patient reports a longstanding history of GI issues dating back until around 2014.  She had an EGD completed at that time which she reports showed polyps.  She also underwent gallbladder evaluation with ultrasound and HIDA scan and CT imaging.    Patient reports she will have episodic symptoms around 2 times per year where her abdomen feels tight, she has gnawing epigastric pain, and then will develop bilious vomiting the last for 24 to 48 hours.    She reports a longstanding history of reflux.  Symptoms have been present for years and she has taken omeprazole for several years.  Reports frequent hiccups and heartburn.  She had been diagnosed with LPR and sleeps on a wedge pillow which does help somewhat.    She reports frequent abdominal bloating.    Denies any blood in the stool or dark stool.  Denies any significant bowel issues.       Result Review :       CBC & Differential (02/21/2023 15:20)   Referral to Gastroenterology for Gastroesophageal reflux disease, unspecified whether esophagitis present (02/21/2023)       Vital Signs:   /72   Pulse 84   Temp 97.6 °F (36.4 °C)   Ht 165.1 cm (65\")   Wt 53 kg (116 lb 14.4 oz)   SpO2 99%   BMI 19.45 kg/m²     Body mass index is 19.45 kg/m².     Physical Exam  Vitals reviewed.   Constitutional:       General: She is not in acute distress.     Appearance: She is well-developed.   HENT:      Head: Normocephalic and atraumatic.   Pulmonary:      Effort: Pulmonary effort is normal. No respiratory distress.   Skin:     General: Skin is dry.      Coloration: Skin is not pale.   Neurological:      Mental Status: She is alert and oriented to person, place, and time.   Psychiatric:         Thought Content: Thought content normal.           Assessment and Plan    Diagnoses and all " orders for this visit:    1. Gastroesophageal reflux disease, unspecified whether esophagitis present (Primary)  -     NM Gastric Emptying; Future    2. Bloating  -     NM Gastric Emptying; Future    3. Epigastric pain  -     NM Gastric Emptying; Future    4. Bilious vomiting with nausea  -     NM Gastric Emptying; Future         Discussion  Today for evaluation GERD, bloating, epigastric pain with nausea and vomiting.  Recommended EGD for further evaluation, assess for any evidence of peptic ulcer disease, gastritis, H. pylori infection, or esophagitis.  Also recommended gastric emptying study to evaluate for gastroparesis which could be contributing to symptoms.  Reviewed dietary modifications to help with reflux at today's office visit.  We will continue omeprazole at this time and provide further recommendations regarding ongoing treatment dependent upon exam findings.          Follow Up   Return for Follow up to review results after testing complete.    Patient Instructions   Schedule EGD for further evaluation of symptoms.     Schedule gastric emptying study to assess for gastroparesis.     For GERD, follow antireflux precautions.  Recommend avoiding eating within 3 to 4 hours of bedtime.  Avoid foods that can trigger symptoms which may include citrus fruits, spicy foods, tomatoes and red sauces, chocolate, coffee/tea, caffeinated or carbonated beverages, alcohol.

## 2023-03-28 ENCOUNTER — TELEPHONE (OUTPATIENT)
Dept: FAMILY MEDICINE CLINIC | Facility: CLINIC | Age: 28
End: 2023-03-28

## 2023-03-28 ENCOUNTER — TELEPHONE (OUTPATIENT)
Dept: FAMILY MEDICINE CLINIC | Facility: CLINIC | Age: 28
End: 2023-03-28
Payer: MEDICAID

## 2023-03-28 NOTE — TELEPHONE ENCOUNTER
Caller: Letitia Hernandez    Relationship: Self    Best call back number: [phone] (Mobile)    What is the medical concern/diagnosis: ENDO     What specialty or service is being requested:      What is the provider, practice or medical service name:      What is the office location:      What is the office phone number:      Any additional details:  PLEASE CONTACT PATIENT TO ADVISE.        THANKS

## 2023-03-29 DIAGNOSIS — R42 POSTURAL DIZZINESS WITH PRESYNCOPE: Primary | ICD-10-CM

## 2023-03-29 DIAGNOSIS — R55 POSTURAL DIZZINESS WITH PRESYNCOPE: Primary | ICD-10-CM

## 2023-03-31 ENCOUNTER — HOSPITAL ENCOUNTER (OUTPATIENT)
Dept: CARDIOLOGY | Facility: HOSPITAL | Age: 28
Discharge: HOME | End: 2023-03-31
Admitting: NURSE PRACTITIONER
Payer: MEDICAID

## 2023-03-31 VITALS
WEIGHT: 116 LBS | HEART RATE: 89 BPM | SYSTOLIC BLOOD PRESSURE: 106 MMHG | DIASTOLIC BLOOD PRESSURE: 73 MMHG | OXYGEN SATURATION: 98 % | BODY MASS INDEX: 19.33 KG/M2 | HEIGHT: 65 IN

## 2023-03-31 DIAGNOSIS — R01.1 HEART MURMUR: ICD-10-CM

## 2023-03-31 LAB
AORTIC ARCH: 1.8 CM
AORTIC DIMENSIONLESS INDEX: 0.9 (DI)
ASCENDING AORTA: 2.3 CM
BH CV ECHO MEAS - ACS: 1.34 CM
BH CV ECHO MEAS - AO MAX PG: 7.8 MMHG
BH CV ECHO MEAS - AO MEAN PG: 4 MMHG
BH CV ECHO MEAS - AO ROOT DIAM: 1.46 CM
BH CV ECHO MEAS - AO V2 MAX: 139.8 CM/SEC
BH CV ECHO MEAS - AO V2 VTI: 26.9 CM
BH CV ECHO MEAS - AVA(I,D): 1.9 CM2
BH CV ECHO MEAS - EDV(CUBED): 39 ML
BH CV ECHO MEAS - EDV(MOD-SP2): 47 ML
BH CV ECHO MEAS - EDV(MOD-SP4): 41 ML
BH CV ECHO MEAS - EF(MOD-BP): 55.3 %
BH CV ECHO MEAS - EF(MOD-SP2): 55.3 %
BH CV ECHO MEAS - EF(MOD-SP4): 56.1 %
BH CV ECHO MEAS - ESV(CUBED): 9.5 ML
BH CV ECHO MEAS - ESV(MOD-SP2): 21 ML
BH CV ECHO MEAS - ESV(MOD-SP4): 18 ML
BH CV ECHO MEAS - FS: 37.5 %
BH CV ECHO MEAS - IVS/LVPW: 1.05 CM
BH CV ECHO MEAS - IVSD: 0.78 CM
BH CV ECHO MEAS - LAT PEAK E' VEL: 18.5 CM/SEC
BH CV ECHO MEAS - LV DIASTOLIC VOL/BSA (35-75): 26.1 CM2
BH CV ECHO MEAS - LV MASS(C)D: 67.5 GRAMS
BH CV ECHO MEAS - LV MAX PG: 6.5 MMHG
BH CV ECHO MEAS - LV MEAN PG: 3.1 MMHG
BH CV ECHO MEAS - LV SYSTOLIC VOL/BSA (12-30): 11.5 CM2
BH CV ECHO MEAS - LV V1 MAX: 127.2 CM/SEC
BH CV ECHO MEAS - LV V1 VTI: 24 CM
BH CV ECHO MEAS - LVIDD: 3.4 CM
BH CV ECHO MEAS - LVIDS: 2.12 CM
BH CV ECHO MEAS - LVOT AREA: 2.13 CM2
BH CV ECHO MEAS - LVOT DIAM: 1.65 CM
BH CV ECHO MEAS - LVPWD: 0.75 CM
BH CV ECHO MEAS - MED PEAK E' VEL: 13.3 CM/SEC
BH CV ECHO MEAS - MV A DUR: 0.1 SEC
BH CV ECHO MEAS - MV A MAX VEL: 61.6 CM/SEC
BH CV ECHO MEAS - MV DEC SLOPE: 657.2 CM/SEC2
BH CV ECHO MEAS - MV DEC TIME: 156 MSEC
BH CV ECHO MEAS - MV E MAX VEL: 91.3 CM/SEC
BH CV ECHO MEAS - MV E/A: 1.48
BH CV ECHO MEAS - MV MAX PG: 5.7 MMHG
BH CV ECHO MEAS - MV MEAN PG: 2.29 MMHG
BH CV ECHO MEAS - MV P1/2T: 53.8 MSEC
BH CV ECHO MEAS - MV V2 VTI: 28.6 CM
BH CV ECHO MEAS - MVA(P1/2T): 4.1 CM2
BH CV ECHO MEAS - MVA(VTI): 1.79 CM2
BH CV ECHO MEAS - PA ACC TIME: 0.16 SEC
BH CV ECHO MEAS - PA PR(ACCEL): 7.7 MMHG
BH CV ECHO MEAS - PA V2 MAX: 122.2 CM/SEC
BH CV ECHO MEAS - PULM A REVS DUR: 0.13 SEC
BH CV ECHO MEAS - PULM A REVS VEL: 39.1 CM/SEC
BH CV ECHO MEAS - PULM DIAS VEL: 68.7 CM/SEC
BH CV ECHO MEAS - PULM S/D: 0.92
BH CV ECHO MEAS - PULM SYS VEL: 63.2 CM/SEC
BH CV ECHO MEAS - QP/QS: 0.47
BH CV ECHO MEAS - RAP SYSTOLE: 3 MMHG
BH CV ECHO MEAS - RV MAX PG: 2.7 MMHG
BH CV ECHO MEAS - RV V1 MAX: 82.3 CM/SEC
BH CV ECHO MEAS - RV V1 VTI: 20.4 CM
BH CV ECHO MEAS - RVOT DIAM: 1.22 CM
BH CV ECHO MEAS - SI(MOD-SP2): 16.6 ML/M2
BH CV ECHO MEAS - SI(MOD-SP4): 14.7 ML/M2
BH CV ECHO MEAS - SUP REN AO DIAM: 1.8 CM
BH CV ECHO MEAS - SV(LVOT): 51.1 ML
BH CV ECHO MEAS - SV(MOD-SP2): 26 ML
BH CV ECHO MEAS - SV(MOD-SP4): 23 ML
BH CV ECHO MEAS - SV(RVOT): 24 ML
BH CV ECHO MEAS - TAPSE (>1.6): 1.99 CM
BH CV ECHO MEASUREMENTS AVERAGE E/E' RATIO: 5.74
BH CV XLRA - RV BASE: 3.1 CM
BH CV XLRA - RV LENGTH: 5.7 CM
BH CV XLRA - RV MID: 3 CM
BH CV XLRA - TDI S': 9.6 CM/SEC
MAXIMAL PREDICTED HEART RATE: 194 BPM
SINUS: 1.91 CM
STJ: 1.68 CM
STRESS TARGET HR: 165 BPM

## 2023-03-31 PROCEDURE — 93306 TTE W/DOPPLER COMPLETE: CPT | Performed by: INTERNAL MEDICINE

## 2023-03-31 PROCEDURE — 93306 TTE W/DOPPLER COMPLETE: CPT

## 2023-04-03 ENCOUNTER — TELEPHONE (OUTPATIENT)
Dept: CARDIOLOGY | Facility: CLINIC | Age: 28
End: 2023-04-03
Payer: MEDICAID

## 2023-04-03 NOTE — TELEPHONE ENCOUNTER
Called and left VM. Will continue to try to reach patient.     Rosangela Balderas RN  Triage [facility] [place]

## 2023-04-03 NOTE — TELEPHONE ENCOUNTER
Please inform patient the echo shows no significant changes from her last. Her heart remains strong and there is no valve disease. She has borderline hyperdynamic function of the LV and hyperdynamic RV. This can be from volume depletion or routine exercise so have her ensure good hydration daily. If she is not having SOB, chest pain or rapid vesna gain then ok to keep follow up appt as scheduled and she needs to call Kathie or Dr. Matthews with any changes in symptoms.

## 2023-04-03 NOTE — TELEPHONE ENCOUNTER
Pt called back. Went over results and recommendations. She verbalized understanding.    Thank you,    Shereen Davis, RN  Triage [facility]  04/03/23 08:58 EDT

## 2023-04-10 ENCOUNTER — TELEPHONE (OUTPATIENT)
Dept: FAMILY MEDICINE CLINIC | Facility: CLINIC | Age: 28
End: 2023-04-10

## 2023-04-10 NOTE — TELEPHONE ENCOUNTER
HUB UNABLE TO WARM TRANSFER     Caller: Letitia Hernandez    Relationship to patient: Self    Best call back number: [phone]    Chief complaint: SORE THROAT WITH WHITE PATCHES IN MOUTH     Type of visit: SAME DAY     Requested date: TODAY     Additional notes: PATIENT IS REQUESTING SAME DAY, NON AVAILABLE. IS REQUESTING A CALL BACK FOR AN APPOINTMENT. 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          ”

## 2023-04-13 ENCOUNTER — TELEPHONE (OUTPATIENT)
Dept: FAMILY MEDICINE CLINIC | Facility: CLINIC | Age: 28
End: 2023-04-13

## 2023-04-13 NOTE — TELEPHONE ENCOUNTER
Hub ok to read: Due to pt's insurance, Endocrinologists are booked out pretty far and options are limited. I sent the referral to [facility] to see how quickly they can get her in.

## 2023-04-13 NOTE — TELEPHONE ENCOUNTER
Caller: Letitia Hernandez    Relationship to patient: Self    Best call back number: [phone]    Patient is needing: PATIENT STATES THAT THE ENDOCRINOLOGIST THAT SHE WAS REFERRED TO IS UNABLE TO GET HER IN ANYTIME SOON, AND THAT THEY ARE BOOKED OUT PRETTY FAR. SHE WOULD LIKE TO SEE IF THERE IS ANYONE ELSE SHE CAN BE REFERRED TO. PLEASE ADVISE.

## 2023-04-18 ENCOUNTER — TELEPHONE (OUTPATIENT)
Dept: FAMILY MEDICINE CLINIC | Facility: CLINIC | Age: 28
End: 2023-04-18

## 2023-04-18 RX ORDER — ONDANSETRON 4 MG/1
4 TABLET, ORALLY DISINTEGRATING ORAL EVERY 8 HOURS PRN
Qty: 20 TABLET | Refills: 0 | Status: SHIPPED | OUTPATIENT
Start: 2023-04-18

## 2023-04-18 NOTE — TELEPHONE ENCOUNTER
Caller: Letitia Hernandez RENE    Relationship: Self    Best call back number: [phone]    Requested Prescriptions:   Requested Prescriptions     Pending Prescriptions Disp Refills   • ondansetron ODT (ZOFRAN-ODT) 4 MG disintegrating tablet 20 tablet 0     Sig: Place 1 tablet on the tongue Every 8 (Eight) Hours As Needed for Nausea or Vomiting.        Pharmacy where request should be sent: [id number] [place] [address] AT [facility] [address]  [phone] [facility] [phone] FX     Last office visit with prescribing clinician: 3/17/2023   Last telemedicine visit with prescribing clinician: Visit date not found   Next office visit with prescribing clinician: Visit date not found     Additional details provided by patient:     Does the patient have less than a 3 day supply:  [] Yes  [] No    Would you like a call back once the refill request has been completed: [x] Yes [] No    If the office needs to give you a call back, can they leave a voicemail: [x] Yes [] No    Domingo Morin Rep   04/18/23 14:02 EDT

## 2023-04-18 NOTE — TELEPHONE ENCOUNTER
Caller: Letitia Hernandez    Relationship: Self    Best call back number: [phone]    What medication are you requesting: IBUPROFEN     What are your current symptoms: MENSTRUAL PAIN    If a prescription is needed, what is your preferred pharmacy and phone number: [facility] #[id number] [place], [place] - [address] AT [facility] [address] - [phone]  - [phone] FX     Additional notes: PATIENT STATED THAT HER AND DR MORALES DISCUSSED MENSTRUAL PAIN AND SHE ADVISED HER THAT SHE CAN TAKE UP TO 6 ADVILS AT ONCE.    SHE WOULD LIKE TO KNOW IF SHE CAN PRESCRIBE AN IBUPROFEN THAT EQUALS TO THAT MG.    PLEASE CALL.

## 2023-04-18 NOTE — TELEPHONE ENCOUNTER
Rx Refill Note  Requested Prescriptions     Pending Prescriptions Disp Refills   • ondansetron ODT (ZOFRAN-ODT) 4 MG disintegrating tablet 20 tablet 0     Si tablet Every 8 (Eight) Hours As Needed for Nausea or Vomiting.      Last office visit with prescribing clinician: 3/17/2023   Last telemedicine visit with prescribing clinician: 2023   Next office visit with prescribing clinician: 2023                         Would you like a call back once the refill request has been completed: [] Yes [] No    If the office needs to give you a call back, can they leave a voicemail: [] Yes [] No    Julianne Zuluaga LPN  23, 14:25 EDT

## 2023-04-27 NOTE — TELEPHONE ENCOUNTER
Caller: Letitia Hernandez    Relationship: Self    Best call back number: [phone]    What was the call regarding: PATIENT IS ASKING IF  MG IS SCORED AND IS THERE A GEL CAPSULE OPTION?     Do you require a callback: YES

## 2023-05-01 ENCOUNTER — TELEPHONE (OUTPATIENT)
Dept: GASTROENTEROLOGY | Facility: CLINIC | Age: 28
End: 2023-05-01
Payer: MEDICAID

## 2023-05-01 NOTE — TELEPHONE ENCOUNTER
Called and let patient know that she is okay to proceed with procedure. Pt verbalized understanding. EL

## 2023-05-02 ENCOUNTER — ANESTHESIA EVENT (OUTPATIENT)
Dept: SURGERY | Facility: SURGERY CENTER | Age: 28
End: 2023-05-02
Payer: MEDICAID

## 2023-05-02 ENCOUNTER — ANESTHESIA (OUTPATIENT)
Dept: SURGERY | Facility: SURGERY CENTER | Age: 28
End: 2023-05-02
Payer: MEDICAID

## 2023-05-02 ENCOUNTER — HOSPITAL ENCOUNTER (OUTPATIENT)
Facility: SURGERY CENTER | Age: 28
Setting detail: HOSPITAL OUTPATIENT SURGERY
Discharge: HOME | End: 2023-05-02
Attending: INTERNAL MEDICINE | Admitting: INTERNAL MEDICINE
Payer: MEDICAID

## 2023-05-02 VITALS
DIASTOLIC BLOOD PRESSURE: 76 MMHG | SYSTOLIC BLOOD PRESSURE: 115 MMHG | BODY MASS INDEX: 19.73 KG/M2 | HEART RATE: 72 BPM | HEIGHT: 65 IN | TEMPERATURE: 97.2 F | WEIGHT: 118.4 LBS | OXYGEN SATURATION: 98 % | RESPIRATION RATE: 18 BRPM

## 2023-05-02 DIAGNOSIS — K21.9 GASTROESOPHAGEAL REFLUX DISEASE, UNSPECIFIED WHETHER ESOPHAGITIS PRESENT: ICD-10-CM

## 2023-05-02 DIAGNOSIS — R11.14 BILIOUS VOMITING WITH NAUSEA: ICD-10-CM

## 2023-05-02 DIAGNOSIS — R14.0 BLOATING: ICD-10-CM

## 2023-05-02 DIAGNOSIS — R10.13 EPIGASTRIC PAIN: ICD-10-CM

## 2023-05-02 LAB
B-HCG UR QL: NEGATIVE
EXPIRATION DATE: NORMAL
INTERNAL NEGATIVE CONTROL: NORMAL
INTERNAL POSITIVE CONTROL: NORMAL
Lab: NORMAL

## 2023-05-02 PROCEDURE — 81025 URINE PREGNANCY TEST: CPT | Performed by: INTERNAL MEDICINE

## 2023-05-02 PROCEDURE — 25010000002 PROPOFOL 10 MG/ML EMULSION: Performed by: NURSE ANESTHETIST, CERTIFIED REGISTERED

## 2023-05-02 PROCEDURE — 43239 EGD BIOPSY SINGLE/MULTIPLE: CPT | Performed by: INTERNAL MEDICINE

## 2023-05-02 PROCEDURE — 88305 TISSUE EXAM BY PATHOLOGIST: CPT | Performed by: INTERNAL MEDICINE

## 2023-05-02 PROCEDURE — 88312 SPECIAL STAINS GROUP 1: CPT | Performed by: INTERNAL MEDICINE

## 2023-05-02 RX ORDER — LIDOCAINE HYDROCHLORIDE 10 MG/ML
0.5 INJECTION, SOLUTION INFILTRATION; PERINEURAL ONCE AS NEEDED
Status: DISCONTINUED | OUTPATIENT
Start: 2023-05-02 | End: 2023-05-02 | Stop reason: HOSPADM

## 2023-05-02 RX ORDER — SODIUM CHLORIDE, SODIUM LACTATE, POTASSIUM CHLORIDE, CALCIUM CHLORIDE 600; 310; 30; 20 MG/100ML; MG/100ML; MG/100ML; MG/100ML
1000 INJECTION, SOLUTION INTRAVENOUS CONTINUOUS
Status: DISCONTINUED | OUTPATIENT
Start: 2023-05-02 | End: 2023-05-02 | Stop reason: HOSPADM

## 2023-05-02 RX ORDER — LIDOCAINE HYDROCHLORIDE 20 MG/ML
INJECTION, SOLUTION INFILTRATION; PERINEURAL AS NEEDED
Status: DISCONTINUED | OUTPATIENT
Start: 2023-05-02 | End: 2023-05-02 | Stop reason: SURG

## 2023-05-02 RX ORDER — SODIUM CHLORIDE 0.9 % (FLUSH) 0.9 %
3 SYRINGE (ML) INJECTION EVERY 12 HOURS SCHEDULED
Status: DISCONTINUED | OUTPATIENT
Start: 2023-05-02 | End: 2023-05-02 | Stop reason: HOSPADM

## 2023-05-02 RX ORDER — SODIUM CHLORIDE, SODIUM LACTATE, POTASSIUM CHLORIDE, CALCIUM CHLORIDE 600; 310; 30; 20 MG/100ML; MG/100ML; MG/100ML; MG/100ML
30 INJECTION, SOLUTION INTRAVENOUS CONTINUOUS PRN
Status: DISCONTINUED | OUTPATIENT
Start: 2023-05-02 | End: 2023-05-02 | Stop reason: HOSPADM

## 2023-05-02 RX ORDER — SODIUM CHLORIDE 0.9 % (FLUSH) 0.9 %
10 SYRINGE (ML) INJECTION AS NEEDED
Status: DISCONTINUED | OUTPATIENT
Start: 2023-05-02 | End: 2023-05-02 | Stop reason: HOSPADM

## 2023-05-02 RX ORDER — MAGNESIUM HYDROXIDE 1200 MG/15ML
LIQUID ORAL AS NEEDED
Status: DISCONTINUED | OUTPATIENT
Start: 2023-05-02 | End: 2023-05-02 | Stop reason: HOSPADM

## 2023-05-02 RX ORDER — PROPOFOL 10 MG/ML
VIAL (ML) INTRAVENOUS AS NEEDED
Status: DISCONTINUED | OUTPATIENT
Start: 2023-05-02 | End: 2023-05-02 | Stop reason: SURG

## 2023-05-02 RX ADMIN — PROPOFOL 70 MG: 10 INJECTION, EMULSION INTRAVENOUS at 09:18

## 2023-05-02 RX ADMIN — GLYCOPYRROLATE 0.1 MG: 0.2 INJECTION, SOLUTION INTRAMUSCULAR; INTRAVITREAL at 09:18

## 2023-05-02 RX ADMIN — SODIUM CHLORIDE, POTASSIUM CHLORIDE, SODIUM LACTATE AND CALCIUM CHLORIDE 1000 ML: 600; 310; 30; 20 INJECTION, SOLUTION INTRAVENOUS at 09:06

## 2023-05-02 RX ADMIN — PROPOFOL 140 MCG/KG/MIN: 10 INJECTION, EMULSION INTRAVENOUS at 09:18

## 2023-05-02 RX ADMIN — LIDOCAINE HYDROCHLORIDE 60 MG: 20 INJECTION, SOLUTION INFILTRATION; PERINEURAL at 09:18

## 2023-05-02 NOTE — ANESTHESIA POSTPROCEDURE EVALUATION
"Patient: Letitia Hernandez    Procedure Summary     Date: 05/02/23 Room / Location: [facility]    Anesthesia Start: 0916 Anesthesia Stop: 0930    Procedure: ESOPHAGOGASTRODUODENOSCOPY WITH BX Diagnosis:       Gastroesophageal reflux disease, unspecified whether esophagitis present      Bloating      Epigastric pain      Bilious vomiting with nausea      (Gastroesophageal reflux disease, unspecified whether esophagitis present [K21.9])      (Bloating [R14.0])      (Epigastric pain [R10.13])      (Bilious vomiting with nausea [R11.14])    Surgeons: Jitendra Parikh MD Provider: Srinath Patel MD    Anesthesia Type: MAC ASA Status: 2          Anesthesia Type: MAC    Vitals  Vitals Value Taken Time   /76 05/02/23 0940   Temp 36.2 °C (97.2 °F) 05/02/23 0930   Pulse 72 05/02/23 0940   Resp 18 05/02/23 0940   SpO2 98 % 05/02/23 0940           Post Anesthesia Care and Evaluation    Patient location during evaluation: bedside  Patient participation: complete - patient participated  Level of consciousness: awake and alert  Pain management: adequate    Airway patency: patent  Anesthetic complications: No anesthetic complications  PONV Status: controlled  Cardiovascular status: acceptable  Respiratory status: acceptable  Hydration status: acceptable    Comments: /76 (BP Location: Left arm, Patient Position: Sitting)   Pulse 72   Temp 36.2 °C (97.2 °F) (Tympanic)   Resp 18   Ht 165.1 cm (65\")   Wt 53.7 kg (118 lb 6.4 oz)   SpO2 98%   BMI 19.70 kg/m²         "

## 2023-05-02 NOTE — ANESTHESIA PREPROCEDURE EVALUATION
Anesthesia Evaluation     Patient summary reviewed and Nursing notes reviewed   NPO Solid Status: > 8 hours  NPO Liquid Status: > 2 hours           Airway   Mallampati: II  TM distance: >3 FB  Neck ROM: full  No difficulty expected  Dental - normal exam     Pulmonary - negative pulmonary ROS and normal exam   Cardiovascular - negative cardio ROS and normal exam  Exercise tolerance: good (4-7 METS)        Neuro/Psych  (+) syncope,    GI/Hepatic/Renal/Endo    (+)  GERD,      Musculoskeletal (-) negative ROS    Abdominal    Substance History - negative use     OB/GYN negative ob/gyn ROS         Other                        Anesthesia Plan    ASA 2     MAC     intravenous induction     Anesthetic plan, risks, benefits, and alternatives have been provided, discussed and informed consent has been obtained with: patient.    Plan discussed with CRNA.        CODE STATUS:

## 2023-05-02 NOTE — H&P
No chief complaint on file.      HPI  Patient today for an EGD due to GERD and bilious vomiting with epigastric pain and bloating.         Problem List:    Patient Active Problem List   Diagnosis   • Body mass index (BMI) of 19 or less in adult   • Vitamin D deficiency   • GERD (gastroesophageal reflux disease)   • Allergic rhinitis   • Bloating   • Epigastric pain   • Bilious vomiting with nausea       Medical History:    Past Medical History:   Diagnosis Date   • Gastric polyp    • GERD (gastroesophageal reflux disease)         Social History:    Social History     Socioeconomic History   • Marital status: Single   Tobacco Use   • Smoking status: Never   • Smokeless tobacco: Never   • Tobacco comments:     tried smoking in high school, caffeine use maybe twice weekly   Vaping Use   • Vaping Use: Never used   Substance and Sexual Activity   • Alcohol use: Not Currently     Comment: socially   • Drug use: Not Currently     Comment: marijuana twice monthly   • Sexual activity: Yes     Partners: Male     Birth control/protection: Condom     Comment: not since 2019       Family History:   Family History   Problem Relation Age of Onset   • Hypertension Mother    • Hypertension Father    • Seizures Brother    • Cancer Maternal Aunt    • Cancer Maternal Grandmother    • Colon polyps Neg Hx    • Crohn's disease Neg Hx    • Colon cancer Neg Hx    • Irritable bowel syndrome Neg Hx    • Ulcerative colitis Neg Hx        Surgical History:   Past Surgical History:   Procedure Laterality Date   • ENDOSCOPY  02/10/2015    gastric polyp       No current facility-administered medications for this encounter.    Allergies: No Known Allergies     The following portions of the patient's history were reviewed by me and updated as appropriate: review of systems, allergies, current medications, past family history, past medical history, past social history, past surgical history and problem list.    There were no vitals filed for this  visit.    PHYSICAL EXAM:    CONSTITUTIONAL:  today's vital signs reviewed by me  GASTROINTESTINAL: abdomen is soft nontender nondistended with normal active bowel sounds, no masses are appreciated    Assessment/ Plan  We will proceed today with EGD.    Risks and benefits as well as alternatives to endoscopic evaluation were explained to the patient and they voiced understanding and wish to proceed.  These risks include but are not limited to the risk of bleeding, perforation, adverse reaction to sedation, and missed lesions.  The patient was given the opportunity to ask questions prior to the endoscopic procedure.

## 2023-05-03 ENCOUNTER — TELEPHONE (OUTPATIENT)
Dept: GASTROENTEROLOGY | Facility: CLINIC | Age: 28
End: 2023-05-03

## 2023-05-03 NOTE — TELEPHONE ENCOUNTER
Caller: Letitia Hernandez    Relationship: Self    Best call back number: [phone]    What is the best time to reach you: AROUND 10 AM OR 1 PM - 2PM     Who are you requesting to speak with (clinical staff, provider,  specific staff member): CLINICAL    What was the call regarding: PATIENT CALLING WITH CONCERNS ABOUT DIFFICULTY SWALLOWING AND ABD DISCOMFORT AND FEELING OF SOMETHING STUCK IN THROAT.    PATIENT ALSO WANTS TO KNOW IF IT IS OK TO CONTINUE OMEPRAZOLE     PATIENT STATES WILL CALLBACK TO SCHEDULE 4 WEEK FOLLOW UP     Do you require a callback: YES

## 2023-05-03 NOTE — TELEPHONE ENCOUNTER
Called and spoke with patient. Discussed provider recommendations to continue with omeprazole. Pt verbalized understanding. Pt reports that recently it feels like she has a pill stuck in her throat. Pt denies any trouble breathing, eating or drinking, and/or swallowing. Pt reports after eating sometimes the feeling is more uncomfortable. Pt would also like to know if it is okay for her to take her prescription strength ibuprofen during her period, as she gets very bad cramps? Please advise. EL

## 2023-05-03 NOTE — TELEPHONE ENCOUNTER
It is okay to take ibuprofen if needed for menstrual cramps.    There could be some mild irritation in her throat from the scope, this should improve over the next few days.  If it does not please have her let us know.

## 2023-05-04 LAB
LAB AP CASE REPORT: NORMAL
LAB AP CLINICAL INFORMATION: NORMAL
LAB AP DIAGNOSIS COMMENT: NORMAL
PATH REPORT.FINAL DX SPEC: NORMAL
PATH REPORT.GROSS SPEC: NORMAL

## 2023-05-05 RX ORDER — IBUPROFEN 800 MG/1
800 TABLET ORAL EVERY 8 HOURS PRN
Qty: 30 TABLET | Refills: 2 | Status: SHIPPED | OUTPATIENT
Start: 2023-05-05

## 2023-05-08 ENCOUNTER — HOSPITAL ENCOUNTER (OUTPATIENT)
Dept: CT IMAGING | Facility: HOSPITAL | Age: 28
Discharge: HOME | End: 2023-05-08
Admitting: FAMILY MEDICINE
Payer: MEDICAID

## 2023-05-08 DIAGNOSIS — R42 POSTURAL DIZZINESS WITH PRESYNCOPE: ICD-10-CM

## 2023-05-08 DIAGNOSIS — R55 POSTURAL DIZZINESS WITH PRESYNCOPE: ICD-10-CM

## 2023-05-08 PROCEDURE — 70498 CT ANGIOGRAPHY NECK: CPT

## 2023-05-08 PROCEDURE — 70496 CT ANGIOGRAPHY HEAD: CPT

## 2023-05-08 PROCEDURE — 25510000001 IOPAMIDOL 61 % SOLUTION: Performed by: FAMILY MEDICINE

## 2023-05-08 RX ADMIN — IOPAMIDOL 100 ML: 612 INJECTION, SOLUTION INTRAVENOUS at 16:15

## 2023-05-10 ENCOUNTER — TELEPHONE (OUTPATIENT)
Dept: FAMILY MEDICINE CLINIC | Facility: CLINIC | Age: 28
End: 2023-05-10

## 2023-05-10 NOTE — TELEPHONE ENCOUNTER
Caller: Letitia Hernandez    Relationship: Self    Best call back number: [phone]     What is the medical concern/diagnosis: RANDOM FAINTING SPELLS    What specialty or service is being requested:     CARDIOLOGIST WANTS ABHIJANKI TO GET A TABLE TILT TEST, TOLD HER TO CHECK WITH HER PCP TO GET THIS DONE.    Any additional details:     ANY QUESTIONS OR CONCERNS PLEASE CALL PATEINT

## 2023-05-10 NOTE — TELEPHONE ENCOUNTER
Caller: Letitia Hernandez    Relationship: Self    Best call back number: [phone], IF NO ANSWER, PLEASE LEAVE VOICEMAIL FOR RETURN CALL     Caller requesting test results:     What test was performed: CT OF NECK AND HEAD     When was the test performed: 5/8/23    Where was the test performed: [place]     Additional notes: WOULD LIKE A CALL BACK TO DISCUSS THE RESULTS AND THE NEXT STEPS,

## 2023-05-11 DIAGNOSIS — R93.89 ABNORMAL COMPUTED TOMOGRAPHY ANGIOGRAPHY (CTA): Primary | ICD-10-CM

## 2023-05-11 DIAGNOSIS — R55 POSTURAL DIZZINESS WITH PRESYNCOPE: ICD-10-CM

## 2023-05-11 DIAGNOSIS — R42 POSTURAL DIZZINESS WITH PRESYNCOPE: ICD-10-CM

## 2023-05-11 NOTE — TELEPHONE ENCOUNTER
Hub staff attempted to follow warm transfer process and was unsuccessful     Caller: Letitia Hernandez    Relationship to patient: Self    Best call back number: [phone]     Patient is needing: PATIENT WAS RETURNING A CALL. PATIENT STATES HER ENDOCRINOLOGIST IS MAKING THE REQUEST. PATENT WOULD LIKE TO KNOW IF DR MORALES IS ABLE TO PUT IN THE ORDER WHILE SHE'S STILL AT THE OFFICE     PLEASE ADVISE

## 2023-05-12 NOTE — TELEPHONE ENCOUNTER
Left detailed VM advising that she will have to est care with DR. Benavidez and he will order this. Fredis can not order anything anymore.

## 2023-05-16 ENCOUNTER — OFFICE VISIT (OUTPATIENT)
Dept: GASTROENTEROLOGY | Facility: CLINIC | Age: 28
End: 2023-05-16
Payer: MEDICAID

## 2023-05-16 VITALS
OXYGEN SATURATION: 100 % | TEMPERATURE: 96.5 F | SYSTOLIC BLOOD PRESSURE: 100 MMHG | HEIGHT: 65 IN | BODY MASS INDEX: 19.63 KG/M2 | HEART RATE: 94 BPM | DIASTOLIC BLOOD PRESSURE: 60 MMHG | WEIGHT: 117.8 LBS

## 2023-05-16 DIAGNOSIS — R10.13 EPIGASTRIC PAIN: ICD-10-CM

## 2023-05-16 DIAGNOSIS — R11.14 BILIOUS VOMITING WITH NAUSEA: ICD-10-CM

## 2023-05-16 DIAGNOSIS — K58.9 IRRITABLE BOWEL SYNDROME, UNSPECIFIED TYPE: ICD-10-CM

## 2023-05-16 DIAGNOSIS — K21.9 GASTROESOPHAGEAL REFLUX DISEASE WITHOUT ESOPHAGITIS: Primary | ICD-10-CM

## 2023-05-16 PROCEDURE — 99214 OFFICE O/P EST MOD 30 MIN: CPT | Performed by: NURSE PRACTITIONER

## 2023-05-16 PROCEDURE — 1160F RVW MEDS BY RX/DR IN RCRD: CPT | Performed by: NURSE PRACTITIONER

## 2023-05-16 PROCEDURE — 1159F MED LIST DOCD IN RCRD: CPT | Performed by: NURSE PRACTITIONER

## 2023-05-16 RX ORDER — SUCRALFATE ORAL 1 G/10ML
1 SUSPENSION ORAL 3 TIMES DAILY
Qty: 1200 ML | Refills: 1 | Status: SHIPPED | OUTPATIENT
Start: 2023-05-16

## 2023-05-16 RX ORDER — DICYCLOMINE HYDROCHLORIDE 10 MG/1
10 CAPSULE ORAL 3 TIMES DAILY PRN
Qty: 90 CAPSULE | Refills: 5 | Status: SHIPPED | OUTPATIENT
Start: 2023-05-16

## 2023-05-16 NOTE — PROGRESS NOTES
"Chief Complaint   Patient presents with   • GI Problem         History of Present Illness  Patient is a 27-year-old female who presents today for follow-up. She was seen in the office March 2023 for GERD, bloating, epigastric pain, nausea, and vomiting.  EGD was performed May 2023 and was normal.  Gastric emptying study was recommended and has not yet been completed.  Prior evaluation has included CT scan, ultrasound, and HIDA scan.    Patient presents today for follow-up.  She reports she has been experiencing some increase in reflux symptoms since recent EGD.  Rosalba had used omeprazole as needed but has not been requiring it every day.  She has had some discomfort in her epigastric area and chest.    She has had no nausea and vomiting episodes since her last office visit.    She reports yesterday she experienced lower abdominal cramping.  This improved after a bowel movement.  She had frequent bowel movements yesterday.  She reports there is suspicion by her gynecologist that she may have endometriosis.  She has at times noted increased discomfort in bowel symptoms around her menstrual cycle.  Denies any blood in the stool.     Result Review :       Tissue Pathology Exam (05/02/2023 09:23)   UPPER GI ENDOSCOPY (05/02/2023 09:13)   UPPER GI ENDOSCOPY (05/02/2023 09:13)   Office Visit with Lyla Hawk APRN (03/23/2023)   NM hepatobiliary w cck (02/12/2016 08:21)   ULTRASOUND GALLBLADDER (11/28/2014 18:13)   CT Abdomen Pelvis With Contrast (01/17/2019 00:33)   CBC & Differential (02/21/2023 15:20)       Vital Signs:   /60   Pulse 94   Temp 96.5 °F (35.8 °C)   Ht 165.1 cm (65\")   Wt 53.4 kg (117 lb 12.8 oz)   SpO2 100%   BMI 19.60 kg/m²     Body mass index is 19.6 kg/m².     Physical Exam  Vitals reviewed.   Constitutional:       General: She is not in acute distress.     Appearance: She is well-developed.   HENT:      Head: Normocephalic and atraumatic.   Pulmonary:      Effort: Pulmonary effort is " normal. No respiratory distress.   Abdominal:      General: Abdomen is flat. Bowel sounds are normal. There is no distension.      Palpations: Abdomen is soft.      Tenderness: There is no abdominal tenderness.   Skin:     General: Skin is dry.      Coloration: Skin is not pale.   Neurological:      Mental Status: She is alert and oriented to person, place, and time.   Psychiatric:         Thought Content: Thought content normal.           Assessment and Plan    Diagnoses and all orders for this visit:    1. Gastroesophageal reflux disease without esophagitis (Primary)    2. Epigastric pain    3. Bilious vomiting with nausea    4. Irritable bowel syndrome, unspecified type    Other orders  -     sucralfate (Carafate) 1 GM/10ML suspension; Take 10 mL by mouth 3 (Three) Times a Day.  Dispense: 1200 mL; Refill: 1  -     dicyclomine (BENTYL) 10 MG capsule; Take 1 capsule by mouth 3 (Three) Times a Day As Needed (abdominal pain/diarrhea).  Dispense: 90 capsule; Refill: 5         Discussion  Patient presents today for follow-up after EGD.  EGD unremarkable with slight laxity of the LES but otherwise no abnormality seen.  Recommended proceeding with gastric emptying study as planned for further evaluation of upper GI symptoms.  Discussed with patient it is okay to continue omeprazole as needed.  If symptoms improve and remain controlled, could consider transitioning to Pepcid.  Will provide prescription for Carafate to be used for breakthrough heartburn.    Regarding lower abdominal cramping and increased frequency, this was a new problem, suspect potentially related to diet or possible viral source due to short duration of symptoms.  Also could potentially be secondary to irritable bowel syndrome, will provide prescription for dicyclomine to be used as needed.          Follow Up   Return for Follow up to review results after testing complete.    Patient Instructions   Schedule gastric emptying study to assess for  gastroparesis.    For GERD, continue omeprazole as needed for symptoms.  Okay to take this daily if needed.  Okay to use Tums as needed.    For chest and epigastric discomfort, begin trial of Carafate as needed.  Prescription sent to pharmacy.    For abdominal cramping/diarrhea, begin trial of dicyclomine as prescribed. Prescription sent to pharmacy.

## 2023-05-16 NOTE — PATIENT INSTRUCTIONS
Schedule gastric emptying study to assess for gastroparesis.    For GERD, continue omeprazole as needed for symptoms.  Okay to take this daily if needed.  Okay to use Tums as needed.    For chest and epigastric discomfort, begin trial of Carafate as needed.  Prescription sent to pharmacy.    For abdominal cramping/diarrhea, begin trial of dicyclomine as prescribed. Prescription sent to pharmacy.

## 2023-05-17 ENCOUNTER — TELEPHONE (OUTPATIENT)
Dept: NEUROSURGERY | Facility: CLINIC | Age: 28
End: 2023-05-17
Payer: MEDICAID

## 2023-05-30 ENCOUNTER — TELEPHONE (OUTPATIENT)
Dept: GASTROENTEROLOGY | Facility: CLINIC | Age: 28
End: 2023-05-30

## 2023-05-30 NOTE — TELEPHONE ENCOUNTER
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

## 2023-05-31 NOTE — PROGRESS NOTES
"Subjective   Patient ID: Letitia Hernandez is a 27 y.o. female is being seen for consultation today at the request of Karyna Mcneal MD for dizziness. CTA head/neck done on 5/8/23. She complains of loss of vision, tunnel vision,and chills that starts at head and goes down her body.      History of Present Illness  27-year-old female with history of GI issues including reflux.  Patient has been having \"episodes\" since age 20 that cause dizziness and balance difficulty and because her heart rate to elevate with develop of \"tunnel vision\".  She has had a cardiac evaluation including monitoring for an arrhythmia with no abnormality identified.  She reports that placing something cold on her neck relieves her symptoms frequently and stress and overheating tend to bring on her symptoms.  She is a non-smoker and does not have any history of hypertension or hyperlipidemia.  She was referred for an \"abnormal CTA\".  She has not been evaluated by neurology or had an inner ear assessment by ENT.  With the episodes she has no loss of consciousness or periods of amnesia.  She is fully aware of the episodes as they occur.      The following portions of the patient's history were reviewed and updated as appropriate:   She  has a past medical history of Gastric polyp, GERD (gastroesophageal reflux disease), Pituitary cyst, and Syncope.  She does not have any pertinent problems on file.  She  has a past surgical history that includes Esophagogastroduodenoscopy (02/10/2015) and Esophagogastroduodenoscopy (N/A, 5/2/2023).  Her family history includes Cancer in her maternal aunt and maternal grandmother; Hypertension in her father and mother; Seizures in her brother.  She  reports that she has never smoked. She has never used smokeless tobacco. She reports that she does not currently use alcohol. She reports that she does not currently use drugs.  Current Outpatient Medications   Medication Sig Dispense Refill   • albuterol sulfate  " (90 Base) MCG/ACT inhaler Inhale 2 puffs 3 (Three) Times a Day. 6.7 g 0   • cetirizine (zyrTEC) 10 MG tablet Take 1 tablet by mouth Daily.     • Cholecalciferol (Vitamin D3) 1.25 MG (73786 UT) capsule Take 1 capsule by mouth 1 (One) Time Per Week.     • dicyclomine (BENTYL) 10 MG capsule Take 1 capsule by mouth 3 (Three) Times a Day As Needed (abdominal pain/diarrhea). 90 capsule 5   • fluticasone (FLONASE) 50 MCG/ACT nasal spray Shake liquid and use 2 sprays in each nostril daily. 16 g 10   • ibuprofen (ADVIL,MOTRIN) 800 MG tablet Take 1 tablet by mouth Every 8 (Eight) Hours As Needed for Moderate Pain. 30 tablet 2   • Multiple Vitamins-Minerals (MULTIVITAMIN WITH MINERALS) tablet Take  by mouth.     • omeprazole (priLOSEC) 40 MG capsule Take 1 capsule by mouth Daily. 90 capsule 3   • ondansetron ODT (ZOFRAN-ODT) 4 MG disintegrating tablet Place 1 tablet on the tongue Every 8 (Eight) Hours As Needed for Nausea or Vomiting. 20 tablet 0   • sucralfate (Carafate) 1 GM/10ML suspension Take 10 mL by mouth 3 (Three) Times a Day. 1200 mL 1     No current facility-administered medications for this visit.     Current Outpatient Medications on File Prior to Visit   Medication Sig   • albuterol sulfate  (90 Base) MCG/ACT inhaler Inhale 2 puffs 3 (Three) Times a Day.   • cetirizine (zyrTEC) 10 MG tablet Take 1 tablet by mouth Daily.   • Cholecalciferol (Vitamin D3) 1.25 MG (39472 UT) capsule Take 1 capsule by mouth 1 (One) Time Per Week.   • dicyclomine (BENTYL) 10 MG capsule Take 1 capsule by mouth 3 (Three) Times a Day As Needed (abdominal pain/diarrhea).   • fluticasone (FLONASE) 50 MCG/ACT nasal spray Shake liquid and use 2 sprays in each nostril daily.   • ibuprofen (ADVIL,MOTRIN) 800 MG tablet Take 1 tablet by mouth Every 8 (Eight) Hours As Needed for Moderate Pain.   • Multiple Vitamins-Minerals (MULTIVITAMIN WITH MINERALS) tablet Take  by mouth.   • omeprazole (priLOSEC) 40 MG capsule Take 1 capsule by mouth  "Daily.   • ondansetron ODT (ZOFRAN-ODT) 4 MG disintegrating tablet Place 1 tablet on the tongue Every 8 (Eight) Hours As Needed for Nausea or Vomiting.   • sucralfate (Carafate) 1 GM/10ML suspension Take 10 mL by mouth 3 (Three) Times a Day.     No current facility-administered medications on file prior to visit.     She has No Known Allergies..    Review of Systems   Eyes: Positive for visual disturbance (christian vision).   Gastrointestinal: Positive for nausea. Negative for diarrhea and vomiting.   Musculoskeletal: Positive for gait problem (balance issues. ).   Neurological: Positive for dizziness, light-headedness and numbness (hands/feet ). Negative for speech difficulty and headaches.   Psychiatric/Behavioral: Positive for decreased concentration. Negative for confusion.       Objective     Vitals:    06/01/23 1319   BP: 110/66   Pulse: 94   Temp: 98 °F (36.7 °C)   SpO2: 99%   Weight: 52.8 kg (116 lb 6.4 oz)   Height: 165.1 cm (65\")     Body mass index is 19.37 kg/m².      Physical Exam  Vitals and nursing note reviewed.   Constitutional:       General: She is not in acute distress.     Appearance: She is normal weight.   HENT:      Head: Normocephalic.      Nose: Nose normal.      Mouth/Throat:      Mouth: Mucous membranes are moist.   Eyes:      Extraocular Movements: Extraocular movements intact.      Conjunctiva/sclera: Conjunctivae normal.      Pupils: Pupils are equal, round, and reactive to light.   Cardiovascular:      Rate and Rhythm: Normal rate.   Pulmonary:      Effort: Pulmonary effort is normal.   Musculoskeletal:         General: Normal range of motion.      Cervical back: Normal range of motion.   Skin:     General: Skin is warm and dry.   Neurological:      General: No focal deficit present.      Mental Status: She is alert and oriented to person, place, and time.      Cranial Nerves: No cranial nerve deficit.      Sensory: No sensory deficit.      Motor: No weakness.      Coordination: " "Coordination normal.      Gait: Gait normal.   Psychiatric:         Mood and Affect: Mood normal.         Behavior: Behavior normal.         Thought Content: Thought content normal.         Judgment: Judgment normal.       Neurologic Exam     Mental Status   Oriented to person, place, and time.     Cranial Nerves     CN III, IV, VI   Pupils are equal, round, and reactive to light.          Assessment & Plan   Independent Review of Radiographic Studies:      I personally reviewed the images from the following studies.    The CTA of the head neck vasculature performed on 2023 was reviewed and shows no significant cerebrovascular stenosis.  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  No aneurysm or arteriovenous shunting is indicated.    Medical Decision Makin-year-old female with \"episodes\" that sounds like vasovagal attacks.  No vascular cause for this is appreciated with a normal vertebrobasilar circulation.  Referral to neurology for further work-up is recommended and an ambulatory consult initiated.  Patient is a non-smoker and advised to remain such.  Diagnoses and all orders for this visit:    1. Vasovagal attack (Primary)    2. Panic attack as reaction to stress      Return Referral to neurology.         "

## 2023-06-01 ENCOUNTER — OFFICE VISIT (OUTPATIENT)
Dept: NEUROSURGERY | Facility: CLINIC | Age: 28
End: 2023-06-01

## 2023-06-01 VITALS
BODY MASS INDEX: 19.39 KG/M2 | TEMPERATURE: 98 F | WEIGHT: 116.4 LBS | HEART RATE: 94 BPM | SYSTOLIC BLOOD PRESSURE: 110 MMHG | HEIGHT: 65 IN | DIASTOLIC BLOOD PRESSURE: 66 MMHG | OXYGEN SATURATION: 99 %

## 2023-06-01 DIAGNOSIS — F43.0 PANIC ATTACK AS REACTION TO STRESS: ICD-10-CM

## 2023-06-01 DIAGNOSIS — R55 VASOVAGAL ATTACK: Primary | ICD-10-CM

## 2023-06-01 DIAGNOSIS — F41.0 PANIC ATTACK AS REACTION TO STRESS: ICD-10-CM

## 2023-06-01 PROCEDURE — 99203 OFFICE O/P NEW LOW 30 MIN: CPT | Performed by: RADIOLOGY

## 2023-06-01 PROCEDURE — 1159F MED LIST DOCD IN RCRD: CPT | Performed by: RADIOLOGY

## 2023-06-01 PROCEDURE — 1160F RVW MEDS BY RX/DR IN RCRD: CPT | Performed by: RADIOLOGY

## 2023-06-02 ENCOUNTER — PATIENT ROUNDING (BHMG ONLY) (OUTPATIENT)
Dept: NEUROSURGERY | Facility: CLINIC | Age: 28
End: 2023-06-02

## 2023-06-19 ENCOUNTER — TELEPHONE (OUTPATIENT)
Dept: CARDIOLOGY | Facility: CLINIC | Age: 28
End: 2023-06-19

## 2023-06-19 ENCOUNTER — TELEPHONE (OUTPATIENT)
Dept: FAMILY MEDICINE CLINIC | Facility: CLINIC | Age: 28
End: 2023-06-19

## 2023-06-19 NOTE — TELEPHONE ENCOUNTER
PATIENT CALLED AND STATES SHE CALLED HER CARDIOLOGIST DR. LIZ MARQUEZ WITH [facility] TO SET UP APPOINTMENT FOR TABLE TILT TEST.     THEY ADVISED THAT SHE WOULD NEED A REFERRAL OR ORDER FROM DR. LAWSON     CALL BACK NUMBER [phone]

## 2023-06-19 NOTE — TELEPHONE ENCOUNTER
Caller: Letitia Hernandez    Relationship: Self    Best call back number: [phone]    What is the best time to reach you: ANY    Who are you requesting to speak with (clinical staff, provider,  specific staff member): ANY    What was the call regarding: PATIENT STATED THAT SHE SAW DR. GUPTA AND SHE RECOMMENDED THAT THE PATIENT SCHEDULE A TILT TABLE JOI TEST. PATIENT WANTS TO KNOW IF DR. MARQUEZ WOULD HAVE THAT SCHEDULED. PLEASE CONTACT PATIENT TO ADVISE.     Is it okay if the provider responds through [facility]: YES OR CALL AND LEAVE VOICE MAIL

## 2023-06-19 NOTE — TELEPHONE ENCOUNTER
I called and left a detailed message for patient explaining Abigal MD Evan has already placed an order for a Tilt Table to be done.  The test results will be sent to Dr. Gordon but should patient have concerns or questions after testing is complete and she hears back from Dr. Gordon then she could call Dr. Matthews should she have further questions/ BRIAN

## 2023-07-28 ENCOUNTER — TELEPHONE (OUTPATIENT)
Dept: FAMILY MEDICINE CLINIC | Facility: CLINIC | Age: 28
End: 2023-07-28
Payer: MEDICAID

## 2023-07-31 DIAGNOSIS — J03.01 ACUTE RECURRENT STREPTOCOCCAL TONSILLITIS: Primary | ICD-10-CM

## 2023-08-02 ENCOUNTER — TELEPHONE (OUTPATIENT)
Dept: FAMILY MEDICINE CLINIC | Facility: CLINIC | Age: 28
End: 2023-08-02
Payer: MEDICAID

## 2023-08-04 ENCOUNTER — OFFICE VISIT (OUTPATIENT)
Dept: FAMILY MEDICINE CLINIC | Facility: CLINIC | Age: 28
End: 2023-08-04
Payer: MEDICAID

## 2023-08-04 ENCOUNTER — TELEPHONE (OUTPATIENT)
Dept: FAMILY MEDICINE CLINIC | Facility: CLINIC | Age: 28
End: 2023-08-04
Payer: MEDICAID

## 2023-08-04 VITALS
OXYGEN SATURATION: 100 % | BODY MASS INDEX: 19.16 KG/M2 | TEMPERATURE: 97 F | WEIGHT: 115 LBS | HEIGHT: 65 IN | DIASTOLIC BLOOD PRESSURE: 60 MMHG | HEART RATE: 94 BPM | SYSTOLIC BLOOD PRESSURE: 106 MMHG

## 2023-08-04 DIAGNOSIS — R30.0 DYSURIA: Primary | ICD-10-CM

## 2023-08-04 LAB
BILIRUB BLD-MCNC: NEGATIVE MG/DL
CLARITY, POC: CLEAR
COLOR UR: YELLOW
EXPIRATION DATE: NORMAL
GLUCOSE UR STRIP-MCNC: NEGATIVE MG/DL
KETONES UR QL: NEGATIVE
LEUKOCYTE EST, POC: NEGATIVE
Lab: NORMAL
NITRITE UR-MCNC: NEGATIVE MG/ML
PH UR: 6.5 [PH] (ref 5–8)
PROT UR STRIP-MCNC: NEGATIVE MG/DL
RBC # UR STRIP: NEGATIVE /UL
SP GR UR: 1.03 (ref 1–1.03)
UROBILINOGEN UR QL: NORMAL

## 2023-08-04 RX ORDER — PHENAZOPYRIDINE HYDROCHLORIDE 200 MG/1
200 TABLET, FILM COATED ORAL 3 TIMES DAILY PRN
Qty: 6 TABLET | Refills: 0 | Status: SHIPPED | OUTPATIENT
Start: 2023-08-04 | End: 2023-08-06

## 2023-08-04 RX ORDER — AMOXICILLIN AND CLAVULANATE POTASSIUM 875; 125 MG/1; MG/1
1 TABLET, FILM COATED ORAL EVERY 12 HOURS SCHEDULED
COMMUNITY
Start: 2023-08-03

## 2023-08-06 LAB
BACTERIA UR CULT: NO GROWTH
BACTERIA UR CULT: NORMAL

## 2023-08-08 LAB
A VAGINAE DNA VAG QL NAA+PROBE: NORMAL SCORE
BVAB2 DNA VAG QL NAA+PROBE: NORMAL SCORE
C ALBICANS DNA VAG QL NAA+PROBE: NEGATIVE
C GLABRATA DNA VAG QL NAA+PROBE: NEGATIVE
C TRACH DNA VAG QL NAA+PROBE: NEGATIVE
MEGA1 DNA VAG QL NAA+PROBE: NORMAL SCORE
N GONORRHOEA DNA VAG QL NAA+PROBE: NEGATIVE
T VAGINALIS DNA VAG QL NAA+PROBE: NEGATIVE

## 2023-08-10 ENCOUNTER — TELEPHONE (OUTPATIENT)
Dept: FAMILY MEDICINE CLINIC | Facility: CLINIC | Age: 28
End: 2023-08-10
Payer: MEDICAID

## 2023-08-10 NOTE — TELEPHONE ENCOUNTER
I would recommend we move forth with imaging either with an ultrasound or CT scan given her urine test and vaginal swab were negative.

## 2023-08-10 NOTE — TELEPHONE ENCOUNTER
Caller: Letitia Hernandez    Relationship: Self    Best call back number: [phone]     What is the best time to reach you: ANY    Who are you requesting to speak with (clinical staff, provider,  specific staff member): CLINICAL STAFF    Do you know the name of the person who called:      What was the call regarding: PATIENT CALLED SHE WAS LAST SEEN ON 8/04/23 BY LEX LUDWIG FOR ABDOMINAL PAIN AND SHE IS STILL HAVING THE PAIN.  IT IS WORSE WHEN SHE HAS A FULL BLADDER.  WHAT ARE HER NEXT STEPS OR DOES SHE NEED TO COME BACK INTO THE OFFICE AGAIN.     Is it okay if the provider responds through [facility]: YES

## 2023-08-11 DIAGNOSIS — R10.30 LOWER ABDOMINAL PAIN: Primary | ICD-10-CM

## 2023-08-11 NOTE — TELEPHONE ENCOUNTER
If her symptoms have the same pattern and she has not developed any new symptoms, she does not need to be seen again. I can order the CT scan. The only other test I would recommend that we didn't do was a pregnancy test if she is currently sexually active. She was not at the time of our visit.

## 2023-08-14 NOTE — TELEPHONE ENCOUNTER
Spoke with patient, she is experiencing abdominal pain about belly button. Patient would like to move forward with CT Scan.     Please advise.

## 2023-08-15 ENCOUNTER — TELEPHONE (OUTPATIENT)
Dept: GASTROENTEROLOGY | Facility: CLINIC | Age: 28
End: 2023-08-15
Payer: MEDICAID

## 2023-08-15 NOTE — TELEPHONE ENCOUNTER
"RN received call from patient regarding worsening symptoms. Pt reports that she has h/o of GERD. Pt was last seen in office in 5/2023. Pt reports that she has been having worsening symptoms and \"flare-ups\" over the last few months. Pt reports recurrent treatment with antibiotics for strep and URI. Pt describes flares as pain in the epigastric region that sometimes moves into stomach. Pt reports that her omeprazole and TUMS help reduce the pain, but do not control in fully. Pt reports ability to burp during these flares and reports that this sometimes reduces the pain, but not fully. Pt spoke to PCP about symptoms and an order for CT ABD/PEL was placed. Pt would like to know how to proceed. Please advise. EL   "

## 2023-08-22 ENCOUNTER — OFFICE VISIT (OUTPATIENT)
Dept: GASTROENTEROLOGY | Facility: CLINIC | Age: 28
End: 2023-08-22
Payer: MEDICAID

## 2023-08-22 VITALS
SYSTOLIC BLOOD PRESSURE: 100 MMHG | WEIGHT: 114 LBS | HEIGHT: 65 IN | OXYGEN SATURATION: 100 % | BODY MASS INDEX: 18.99 KG/M2 | DIASTOLIC BLOOD PRESSURE: 60 MMHG | TEMPERATURE: 96.5 F | HEART RATE: 74 BPM

## 2023-08-22 DIAGNOSIS — R10.13 EPIGASTRIC PAIN: Primary | ICD-10-CM

## 2023-08-22 DIAGNOSIS — R11.0 NAUSEA: ICD-10-CM

## 2023-08-22 DIAGNOSIS — R14.0 BLOATING: ICD-10-CM

## 2023-08-22 PROCEDURE — 99214 OFFICE O/P EST MOD 30 MIN: CPT | Performed by: NURSE PRACTITIONER

## 2023-08-22 NOTE — PATIENT INSTRUCTIONS
For GERD and suspected gastritis, continue omeprazole daily as prescribed.  Start Carafate twice daily for 1 month.    Proceed with CT scan as planned for further evaluation.

## 2023-08-22 NOTE — PROGRESS NOTES
"Chief Complaint   Patient presents with    Abdominal Pain         History of Present Illness  Patient is a 28-year-old female who presents today for follow-up.  She contacted the office last week with concerns about worsening symptoms with discomfort in the epigastric area. EGD May 2023 was normal with the exception of some laxity noted at the GE junction.    Patient presents today for follow-up.  Reports over the last few months she has had multiple upper respiratory infections.  Has taken 3-4 rounds of antibiotics, amoxicillin, azithromycin, and Augmentin.  Reports following this she developed worsening GI symptoms.    She has been experiencing pain to her epigastric area, reports she feels inflamed.  At times the pain radiates down the right side of her abdomen.  She has had nausea but no vomiting.  Reports abdominal bloating and distention and feels food is sitting in her stomach.    Reports bowels have been moving fairly regularly.  Denies any blood in the stool or dark stool.    She saw her primary care provider for these issues and is having a CT scan tomorrow for further evaluation.     Result Review :       Tissue Pathology Exam (05/02/2023 09:23)    UPPER GI ENDOSCOPY (05/02/2023 09:13)    Office Visit with Lyla Hawk APRN (05/16/2023)    Tissue Pathology Exam (05/02/2023 09:23)     UPPER GI ENDOSCOPY (05/02/2023 09:13)     UPPER GI ENDOSCOPY (05/02/2023 09:13)     NM hepatobiliary w cck (02/12/2016 08:21)     ULTRASOUND GALLBLADDER (11/28/2014 18:13)     CT Abdomen Pelvis With Contrast (01/17/2019 00:33)     Vital Signs:   /60   Pulse 74   Temp 96.5 øF (35.8 øC)   Ht 165.1 cm (65\")   Wt 51.7 kg (114 lb)   SpO2 100%   BMI 18.97 kg/mý     Body mass index is 18.97 kg/mý.     Physical Exam  Vitals reviewed.   Constitutional:       General: She is not in acute distress.     Appearance: She is well-developed.   HENT:      Head: Normocephalic and atraumatic.   Pulmonary:      Effort: Pulmonary " effort is normal. No respiratory distress.   Abdominal:      General: Abdomen is flat. Bowel sounds are normal. There is no distension.      Palpations: Abdomen is soft.      Tenderness: There is no abdominal tenderness.   Skin:     General: Skin is dry.      Coloration: Skin is not pale.   Neurological:      Mental Status: She is alert and oriented to person, place, and time.   Psychiatric:         Thought Content: Thought content normal.         Assessment and Plan    Diagnoses and all orders for this visit:    1. Epigastric pain (Primary)    2. Bloating    3. Nausea         Discussion  Presents today for follow-up with concerns about worsening abdominal pain, bloating, and nausea.  Symptoms worsen following several treatment courses of antibiotics and suspect she may have developed some gastritis.  Recommended continuing daily proton pump inhibitor and taking Carafate which patient has at home for 1 month.  Will await CT findings.  If CT negative, would then recommend gastric emptying study for further evaluation.          Follow Up   Return if symptoms worsen or fail to improve.    Patient Instructions   For GERD and suspected gastritis, continue omeprazole daily as prescribed.  Start Carafate twice daily for 1 month.    Proceed with CT scan as planned for further evaluation.

## 2023-08-23 ENCOUNTER — HOSPITAL ENCOUNTER (OUTPATIENT)
Dept: CT IMAGING | Facility: HOSPITAL | Age: 28
Discharge: HOME | End: 2023-08-23
Admitting: NURSE PRACTITIONER
Payer: MEDICAID

## 2023-08-23 DIAGNOSIS — R10.30 LOWER ABDOMINAL PAIN: ICD-10-CM

## 2023-08-23 PROCEDURE — 25510000001 IOPAMIDOL 61 % SOLUTION: Performed by: NURSE PRACTITIONER

## 2023-08-23 PROCEDURE — 74177 CT ABD & PELVIS W/CONTRAST: CPT

## 2023-08-23 RX ADMIN — IOPAMIDOL 85 ML: 612 INJECTION, SOLUTION INTRAVENOUS at 09:35

## 2023-08-24 ENCOUNTER — HOSPITAL ENCOUNTER (OUTPATIENT)
Dept: CARDIOLOGY | Facility: HOSPITAL | Age: 28
Discharge: HOME | End: 2023-08-24
Admitting: STUDENT IN AN ORGANIZED HEALTH CARE EDUCATION/TRAINING PROGRAM
Payer: MEDICAID

## 2023-08-24 VITALS
RESPIRATION RATE: 15 BRPM | HEIGHT: 65 IN | WEIGHT: 115 LBS | OXYGEN SATURATION: 99 % | SYSTOLIC BLOOD PRESSURE: 106 MMHG | DIASTOLIC BLOOD PRESSURE: 80 MMHG | TEMPERATURE: 97.6 F | BODY MASS INDEX: 19.16 KG/M2 | HEART RATE: 88 BPM

## 2023-08-24 DIAGNOSIS — R14.0 BLOATING: ICD-10-CM

## 2023-08-24 DIAGNOSIS — R55 VASOVAGAL ATTACK: ICD-10-CM

## 2023-08-24 DIAGNOSIS — R10.13 EPIGASTRIC PAIN: Primary | ICD-10-CM

## 2023-08-24 DIAGNOSIS — R11.0 NAUSEA: ICD-10-CM

## 2023-08-24 LAB
B-HCG UR QL: NEGATIVE
EXPIRATION DATE: NORMAL
INTERNAL NEGATIVE CONTROL: NEGATIVE
INTERNAL POSITIVE CONTROL: POSITIVE
Lab: NORMAL

## 2023-08-24 PROCEDURE — 93660 TILT TABLE EVALUATION: CPT

## 2023-08-24 PROCEDURE — 81025 URINE PREGNANCY TEST: CPT | Performed by: STUDENT IN AN ORGANIZED HEALTH CARE EDUCATION/TRAINING PROGRAM

## 2023-08-24 RX ORDER — SODIUM CHLORIDE 9 MG/ML
75 INJECTION, SOLUTION INTRAVENOUS CONTINUOUS
Status: DISCONTINUED | OUTPATIENT
Start: 2023-08-24 | End: 2023-08-25 | Stop reason: HOSPADM

## 2023-08-24 RX ORDER — SODIUM CHLORIDE 0.9 % (FLUSH) 0.9 %
10 SYRINGE (ML) INJECTION AS NEEDED
Status: DISCONTINUED | OUTPATIENT
Start: 2023-08-24 | End: 2023-08-25 | Stop reason: HOSPADM

## 2023-08-24 NOTE — H&P
Date of Hospital Visit: 23  Encounter Provider: Reed Real MD  Place of Service: [facility]  Patient Name: Letitia Hernandez  :1995  [id number]    Chief complaint: dizziness    History of Present Illness:  28 year old with GERD who presents for tilt table for further assessment of episodes of dizziness and lightheadedness. Her last episode was about 4-5 months ago during which she felt flushed, lightheaded and had tunnel vision. She did not faint but felt lousy the rest of the day.    Past Medical History:   Diagnosis Date    Gastric polyp     GERD (gastroesophageal reflux disease)     Pituitary cyst     Syncope        Past Surgical History:   Procedure Laterality Date    ENDOSCOPY  02/10/2015    gastric polyp    ENDOSCOPY N/A 2023    Procedure: ESOPHAGOGASTRODUODENOSCOPY WITH BX;  Surgeon: Jitendra Parikh MD;  [place] [facility] [place]  Service: Gastroenterology;  Laterality: N/A;  Normal       (Not in a hospital admission)      Current Meds  Current Outpatient Medications on File Prior to Encounter   Medication Sig Dispense Refill    cetirizine (zyrTEC) 10 MG tablet Take 1 tablet by mouth Daily.      Cholecalciferol (Vitamin D3) 1.25 MG (36165 UT) capsule Take 1 capsule by mouth 1 (One) Time Per Week. 12 capsule 0    fluticasone (FLONASE) 50 MCG/ACT nasal spray Shake liquid and use 2 sprays in each nostril daily. 16 g 10    ibuprofen (ADVIL,MOTRIN) 800 MG tablet Take 1 tablet by mouth Every 8 (Eight) Hours As Needed for Moderate Pain. 30 tablet 2    omeprazole (priLOSEC) 40 MG capsule Take 1 capsule by mouth Daily. 90 capsule 3    ondansetron ODT (ZOFRAN-ODT) 4 MG disintegrating tablet Place 1 tablet on the tongue Every 8 (Eight) Hours As Needed for Nausea or Vomiting. 20 tablet 0    albuterol sulfate  (90 Base) MCG/ACT inhaler Inhale 2 puffs 3 (Three) Times a Day. 6.7 g 0    amoxicillin-clavulanate (AUGMENTIN) 875-125 MG per tablet Take 1 tablet by  mouth Every 12 (Twelve) Hours.      brompheniramine-pseudoephedrine-DM 30-2-10 MG/5ML syrup Take 10 mL by mouth 3 (Three) Times a Day As Needed for Congestion or Cough. 240 mL 0    Multiple Vitamins-Minerals (MULTIVITAMIN WITH MINERALS) tablet Take  by mouth.       Current Facility-Administered Medications on File Prior to Encounter   Medication Dose Route Frequency Provider Last Rate Last Admin    [COMPLETED] iopamidol (ISOVUE-300) 61 % injection 100 mL  100 mL Intravenous Once in imaging Meme Real, APROTTO   85 mL at 08/23/23 0935       Social History     Socioeconomic History    Marital status: Single   Tobacco Use    Smoking status: Never    Smokeless tobacco: Never    Tobacco comments:     tried smoking in high school, caffeine use maybe twice weekly   Vaping Use    Vaping Use: Never used   Substance and Sexual Activity    Alcohol use: Not Currently     Comment: socially    Drug use: Not Currently     Comment: marijuana twice monthly    Sexual activity: Defer     Partners: Male     Birth control/protection: Condom     Comment: not since 2019       Family Hx: Non-contributory    REVIEW OF SYSTEMS:   ROS was performed and is negative except as outlined in HPI     REVIEW OF SYSTEMS:   CONSTITUTIONAL: No weight loss, fever, chills, weakness or fatigue.   HEENT: Eyes: No visual loss, blurred vision, double vision or yellow sclerae. Ears, Nose, Throat: No hearing loss, sneezing, congestion, runny nose or sore throat.   SKIN: No rash or itching.     RESPIRATORY: No shortness of breath, hemoptysis, cough or sputum.   GASTROINTESTINAL: No anorexia, nausea, vomiting or diarrhea. No abdominal pain, bright red blood per rectum or melena.  NEUROLOGICAL: No headache, dizziness, syncope, paralysis, numbness or tingling in the extremities.  MUSCULOSKELETAL: No muscle, back pain, joint pain or stiffness.   HEMATOLOGIC: No anemia, bleeding or bruising.   LYMPHATICS: No enlarged nodes.  PSYCHIATRIC: No history of depression,  "anxiety, hallucinations.   ENDOCRINOLOGIC: No reports of sweating, cold or heat intolerance. No polyuria or polydipsia.        Objective:     Vitals:    08/24/23 0749   BP: 110/83   BP Location: Right arm   Patient Position: Lying   Pulse: 88   Resp: 18   Temp: 97.6 øF (36.4 øC)   TempSrc: Temporal   SpO2: 98%   Weight: 52.2 kg (115 lb)   Height: 165.1 cm (65\")     Body mass index is 19.14 kg/mý.  Flowsheet Rows      Flowsheet Row First Filed Value   Admission Height 165.1 cm (65\") Documented at 08/24/2023 0749   Admission Weight 52.2 kg (115 lb) Documented at 08/24/2023 0749            GEN: no distress, alert and oriented  HEENT: NACT, EOMI, moist mucous membranes  Lungs: CTAB, no wheezes, rales or rhonchi  CV: normal rate, regular rhythm, normal S1, S2, no murmurs, +2 radial pulses b/l, no carotid bruit  Abdomen: soft, nontender, nondistended, NABS  Extremities: no edema  Skin: no rash, warm, dry  Heme/Lymph: no bruising  Psych: organized thought, normal behavior and affect          Invalid input(s): GUILHERME PROT      @LABRCNTbnp@              @LABNT(chol,trig,hdl,ldl)      I personally viewed and interpreted the patient's EKG/Telemetry data      Assessment:  There are no hospital problems to display for this patient.      Plan: tilt table        Reed Real MD  08/24/23  08:21 EDT.    "

## 2023-09-11 ENCOUNTER — OFFICE VISIT (OUTPATIENT)
Dept: NEUROLOGY | Facility: CLINIC | Age: 28
End: 2023-09-11
Payer: MEDICAID

## 2023-09-11 VITALS
DIASTOLIC BLOOD PRESSURE: 62 MMHG | BODY MASS INDEX: 19.66 KG/M2 | WEIGHT: 118 LBS | HEIGHT: 65 IN | SYSTOLIC BLOOD PRESSURE: 100 MMHG | OXYGEN SATURATION: 99 % | HEART RATE: 91 BPM

## 2023-09-11 DIAGNOSIS — R55 VASOVAGAL SYNDROME: Primary | ICD-10-CM

## 2023-09-11 PROCEDURE — 1160F RVW MEDS BY RX/DR IN RCRD: CPT | Performed by: PSYCHIATRY & NEUROLOGY

## 2023-09-11 PROCEDURE — 1159F MED LIST DOCD IN RCRD: CPT | Performed by: PSYCHIATRY & NEUROLOGY

## 2023-09-11 PROCEDURE — 99204 OFFICE O/P NEW MOD 45 MIN: CPT | Performed by: PSYCHIATRY & NEUROLOGY

## 2023-09-11 NOTE — PROGRESS NOTES
"Chief Complaint   Patient presents with    Syncope       Patient ID: Letitia Hernandez is a 28 y.o. female.    HPI:  I had the pleasure of seeing your patient today.  As you may know she is a 28-year-old female here for a history of lightheadedness.  The patient says that she has episodes where she will have lightheadedness that began at the age of 19 or 20.  She says that she will experience a sensation that comes over her when this begins.  Typically she has just gotten up from a seated position or she has been under significant stressors.  She says that she will get \"tunnel vision\".  She is also noted that her heart rate increases significantly.  She uses the term \"tachycardia\".  She has never lost consciousness during 1 of these episodes.  She says her symptoms can last anywhere from a few seconds to several minutes.  However she usually will have an uneasy feeling further the remainder of the day.  She also notes that being \"overheated\" will bring on her symptoms.  Placing something cold on her neck usually resolves or improves her symptoms.  Again she has never lost consciousness.  She does not experience focal weakness or numbness of her arms or legs with the symptoms.  She has no loss of awareness or loss of function.  No bowel or bladder incontinence with these episodes.  No automatisms.  She had a tilt table test that was negative.  CT angiography of the head and neck did not show any specific hypervascular lesions or issues related to lightheadedness.  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    The following portions of the patient's history were reviewed and updated as appropriate: allergies, current medications, past family history, past medical history, past social history, past surgical history and problem list.    Review of Systems   Endocrine: Negative for cold intolerance, heat intolerance and polydipsia.   Allergic/Immunologic: Positive for " environmental allergies. Negative for food allergies and immunocompromised state.   Neurological:  Positive for dizziness, syncope and headaches (sharp pain). Negative for tremors, seizures, facial asymmetry, speech difficulty, weakness, light-headedness and numbness.   Hematological:  Negative for adenopathy. Does not bruise/bleed easily.   Psychiatric/Behavioral:  Negative for confusion, decreased concentration and sleep disturbance. The patient is not nervous/anxious.     I have reviewed the review of systems above performed by my medical assistant.      Vitals:    09/11/23 1522   BP: 100/62   Pulse: 91   SpO2: 99%       Neurologic Exam     Mental Status   Oriented to person, place, and time.   Registration: recalls 3 of 3 objects. Follows 3 step commands.   Attention: normal. Concentration: normal.   Speech: speech is normal   Level of consciousness: alert  Knowledge: consistent with education (No deficits found.).   Normal comprehension.     Cranial Nerves     CN II   Visual fields full to confrontation.     CN III, IV, VI   Pupils are equal, round, and reactive to light.  Extraocular motions are normal.   CN III: no CN III palsy  CN VI: no CN VI palsy  Nystagmus: none   Diplopia: none    CN V   Facial sensation intact.     CN VII   Facial expression full, symmetric.     CN VIII   CN VIII normal.     CN IX, X   CN IX normal.   CN X normal.     CN XI   CN XI normal.     CN XII   CN XII normal.     Motor Exam   Muscle bulk: normal  Right arm tone: normal  Left arm tone: normal  Right leg tone: normal  Left leg tone: normal    Strength   Right neck flexion: 5/5  Left neck flexion: 5/5  Right neck extension: 5/5  Left neck extension: 5/5  Right deltoid: 5/5  Left deltoid: 5/5  Right biceps: 5/5  Left biceps: 5/5  Right triceps: 5/5  Left triceps: 5/5  Right wrist flexion: 5/5  Left wrist flexion: 5/5  Right wrist extension: 5/5  Left wrist extension: 5/5  Right interossei: 5/5  Left interossei: 5/5  Right  abdominals: 5/5  Left abdominals: 5/5  Right iliopsoas: 5/5  Left iliopsoas: 5/5  Right quadriceps: 5/5  Left quadriceps: 5/5  Right hamstrin/5  Left hamstrin/5  Right glutei: 5/5  Left glutei: 5/5  Right anterior tibial: 5/5  Left anterior tibial: 5/5  Right posterior tibial: 5/5  Left posterior tibial: 5/5  Right peroneal: 5/5  Left peroneal: 5/5  Right gastroc: 5/5  Left gastroc: 5/5    Sensory Exam   Light touch normal.   Vibration normal.   Proprioception normal.   Pinprick normal.     Gait, Coordination, and Reflexes     Gait  Gait: normal    Coordination   Romberg: negative    Tremor   Resting tremor: absent  Intention tremor: absent    Reflexes   Right brachioradialis: 2+  Left brachioradialis: 2+  Right biceps: 2+  Left biceps: 2+  Right triceps: 2+  Left triceps: 2+  Right patellar: 2+  Left patellar: 2+  Right achilles: 2+  Left achilles: 2+  Right : 2+  Left : 2+Station is normal.     Physical Exam  Vitals reviewed.   Constitutional:       General: She is not in acute distress.     Appearance: She is well-developed.   HENT:      Head: Normocephalic and atraumatic.   Eyes:      Extraocular Movements: EOM normal.      Pupils: Pupils are equal, round, and reactive to light.   Cardiovascular:      Rate and Rhythm: Normal rate and regular rhythm.      Heart sounds: Normal heart sounds.   Pulmonary:      Effort: Pulmonary effort is normal. No respiratory distress.      Breath sounds: Normal breath sounds.   Abdominal:      General: Bowel sounds are normal. There is no distension.      Palpations: Abdomen is soft.      Tenderness: There is no abdominal tenderness.   Musculoskeletal:         General: No deformity.      Cervical back: Normal range of motion.   Skin:     General: Skin is warm.      Findings: No rash.   Neurological:      Mental Status: She is oriented to person, place, and time.      Coordination: Romberg Test normal.      Gait: Gait is intact.      Deep Tendon Reflexes:       Reflex Scores:       Tricep reflexes are 2+ on the right side and 2+ on the left side.       Bicep reflexes are 2+ on the right side and 2+ on the left side.       Brachioradialis reflexes are 2+ on the right side and 2+ on the left side.       Patellar reflexes are 2+ on the right side and 2+ on the left side.       Achilles reflexes are 2+ on the right side and 2+ on the left side.  Psychiatric:         Speech: Speech normal.         Judgment: Judgment normal.       Procedures    Assessment/Plan: We are going to forego further neurodiagnostic testing.  I do not feel that these symptoms are a primary neurological disorder.  However I do feel that she has vasovagal instability/insufficiency/POTS.  I did  her about drinking plenty of water and adding some salt to her diet.  We also discussed medical management however we will forego that.  She will continue follow-up with cardiology as scheduled.  We will see her back in 6 months to review symptoms and discuss further care at that time.  A total of 45 minutes was spent face-to-face with the patient today.  Of that greater than 50% of this time was spent discussing signs and symptoms of vasovagal insufficiency, patient education, plan of care and prognosis.         Diagnoses and all orders for this visit:    1. Vasovagal syndrome (Primary)           López Mccloud II, MD

## 2023-09-11 NOTE — LETTER
"September 11, 2023       No Recipients    Patient: Letitia Hernandez   YOB: 1995   Date of Visit: 9/11/2023     Dear Jocelin Gordon MD:       Thank you for referring Letitia Hernandez to me for evaluation. Below are the relevant portions of my assessment and plan of care.    If you have questions, please do not hesitate to call me. I look forward to following Letitia along with you.         Sincerely,        López Mccloud II, MD        CC:   No Recipients    López Mccloud II, MD  09/11/23 0358  Sign when Signing Visit  Chief Complaint   Patient presents with   • Syncope       Patient ID: Letitia Hernandez is a 28 y.o. female.    HPI:  I had the pleasure of seeing your patient today.  As you may know she is a 28-year-old female here for a history of lightheadedness.  The patient says that she has episodes where she will have lightheadedness that began at the age of 19 or 20.  She says that she will experience a sensation that comes over her when this begins.  Typically she has just gotten up from a seated position or she has been under significant stressors.  She says that she will get \"tunnel vision\".  She is also noted that her heart rate increases significantly.  She uses the term \"tachycardia\".  She has never lost consciousness during 1 of these episodes.  She says her symptoms can last anywhere from a few seconds to several minutes.  However she usually will have an uneasy feeling further the remainder of the day.  She also notes that being \"overheated\" will bring on her symptoms.  Placing something cold on her neck usually resolves or improves her symptoms.  Again she has never lost consciousness.  She does not experience focal weakness or numbness of her arms or legs with the symptoms.  She has no loss of awareness or loss of function.  No bowel or bladder incontinence with these episodes.  No automatisms.  She had a tilt table test that was negative.  CT angiography of the head and neck did not show " any specific hypervascular lesions or issues related to lightheadedness.  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    The following portions of the patient's history were reviewed and updated as appropriate: allergies, current medications, past family history, past medical history, past social history, past surgical history and problem list.    Review of Systems   Endocrine: Negative for cold intolerance, heat intolerance and polydipsia.   Allergic/Immunologic: Positive for environmental allergies. Negative for food allergies and immunocompromised state.   Neurological:  Positive for dizziness, syncope and headaches (sharp pain). Negative for tremors, seizures, facial asymmetry, speech difficulty, weakness, light-headedness and numbness.   Hematological:  Negative for adenopathy. Does not bruise/bleed easily.   Psychiatric/Behavioral:  Negative for confusion, decreased concentration and sleep disturbance. The patient is not nervous/anxious.     I have reviewed the review of systems above performed by my medical assistant.      Vitals:    09/11/23 1522   BP: 100/62   Pulse: 91   SpO2: 99%       Neurologic Exam     Mental Status   Oriented to person, place, and time.   Registration: recalls 3 of 3 objects. Follows 3 step commands.   Attention: normal. Concentration: normal.   Speech: speech is normal   Level of consciousness: alert  Knowledge: consistent with education (No deficits found.).   Normal comprehension.     Cranial Nerves     CN II   Visual fields full to confrontation.     CN III, IV, VI   Pupils are equal, round, and reactive to light.  Extraocular motions are normal.   CN III: no CN III palsy  CN VI: no CN VI palsy  Nystagmus: none   Diplopia: none    CN V   Facial sensation intact.     CN VII   Facial expression full, symmetric.     CN VIII   CN VIII normal.     CN IX, X   CN IX normal.   CN X normal.     CN XI   CN XI normal.     CN XII   CN XII  normal.     Motor Exam   Muscle bulk: normal  Right arm tone: normal  Left arm tone: normal  Right leg tone: normal  Left leg tone: normal    Strength   Right neck flexion: 5/5  Left neck flexion: 5/5  Right neck extension: 5/5  Left neck extension: 5/5  Right deltoid: 5/5  Left deltoid: 5/5  Right biceps: 5/5  Left biceps: 5/5  Right triceps: 5/5  Left triceps: 5/5  Right wrist flexion: 5/5  Left wrist flexion: 5/5  Right wrist extension: 5/5  Left wrist extension: 5/5  Right interossei: 5/5  Left interossei: 5/5  Right abdominals: 5/5  Left abdominals: 5/5  Right iliopsoas: 5/5  Left iliopsoas: 5/5  Right quadriceps: 5/5  Left quadriceps: 5/5  Right hamstrin/5  Left hamstrin/5  Right glutei: 5/5  Left glutei: 5/5  Right anterior tibial: 5/5  Left anterior tibial: 5/5  Right posterior tibial: 5/5  Left posterior tibial: 5/5  Right peroneal: 5/5  Left peroneal: 5/5  Right gastroc: 5/5  Left gastroc: 5/5    Sensory Exam   Light touch normal.   Vibration normal.   Proprioception normal.   Pinprick normal.     Gait, Coordination, and Reflexes     Gait  Gait: normal    Coordination   Romberg: negative    Tremor   Resting tremor: absent  Intention tremor: absent    Reflexes   Right brachioradialis: 2+  Left brachioradialis: 2+  Right biceps: 2+  Left biceps: 2+  Right triceps: 2+  Left triceps: 2+  Right patellar: 2+  Left patellar: 2+  Right achilles: 2+  Left achilles: 2+  Right : 2+  Left : 2+Station is normal.     Physical Exam  Vitals reviewed.   Constitutional:       General: She is not in acute distress.     Appearance: She is well-developed.   HENT:      Head: Normocephalic and atraumatic.   Eyes:      Extraocular Movements: EOM normal.      Pupils: Pupils are equal, round, and reactive to light.   Cardiovascular:      Rate and Rhythm: Normal rate and regular rhythm.      Heart sounds: Normal heart sounds.   Pulmonary:      Effort: Pulmonary effort is normal. No respiratory distress.      Breath  sounds: Normal breath sounds.   Abdominal:      General: Bowel sounds are normal. There is no distension.      Palpations: Abdomen is soft.      Tenderness: There is no abdominal tenderness.   Musculoskeletal:         General: No deformity.      Cervical back: Normal range of motion.   Skin:     General: Skin is warm.      Findings: No rash.   Neurological:      Mental Status: She is oriented to person, place, and time.      Coordination: Romberg Test normal.      Gait: Gait is intact.      Deep Tendon Reflexes:      Reflex Scores:       Tricep reflexes are 2+ on the right side and 2+ on the left side.       Bicep reflexes are 2+ on the right side and 2+ on the left side.       Brachioradialis reflexes are 2+ on the right side and 2+ on the left side.       Patellar reflexes are 2+ on the right side and 2+ on the left side.       Achilles reflexes are 2+ on the right side and 2+ on the left side.  Psychiatric:         Speech: Speech normal.         Judgment: Judgment normal.       Procedures    Assessment/Plan: We are going to forego further neurodiagnostic testing.  I do not feel that these symptoms are a primary neurological disorder.  However I do feel that she has vasovagal instability/insufficiency/POTS.  I did  her about drinking plenty of water and adding some salt to her diet.  We also discussed medical management however we will forego that.  She will continue follow-up with cardiology as scheduled.  We will see her back in 6 months to review symptoms and discuss further care at that time.  A total of 45 minutes was spent face-to-face with the patient today.  Of that greater than 50% of this time was spent discussing signs and symptoms of vasovagal insufficiency, patient education, plan of care and prognosis.         Diagnoses and all orders for this visit:    1. Vasovagal syndrome (Primary)           López Mccloud II, MD

## 2023-09-14 ENCOUNTER — TELEPHONE (OUTPATIENT)
Dept: FAMILY MEDICINE CLINIC | Facility: CLINIC | Age: 28
End: 2023-09-14
Payer: MEDICAID

## 2023-09-14 NOTE — TELEPHONE ENCOUNTER
Caller: Letitia Hernandez    Relationship: Self    Best call back number: [phone]     What is the best time to reach you: AFTER 3:00 PM    Who are you requesting to speak with (clinical staff, provider,  specific staff member): CLINICAL    What was the call regarding: PATIENT STATED SHE REVIEWED THE RESULTS OF HER RECENT CT SCANS, AND STATED THEY NOTED  FREE FLUID IN HER PELVIS.    PATIENT IS REQUESTING TO KNOW WHAT THIS MEANS AND WHAT SHE SHOULD DO FOR THIS.    PATIENT ALSO STATED SHE HAS A HARD LUMP STICKING OUT OF HER LEFT WRIST AND WOULD TAMMY TO KNOW IF SHE SHOULD BE SEEN FOR THIS.    PLEASE CALL TO DISCUSS WITH PATIENT, OR LEAVE A VOICEMAIL IF UNAVAILABLE.    Is it okay if the provider responds through [employer]: PATIENT PREFERS A CALL

## 2023-09-18 ENCOUNTER — PATIENT ROUNDING (BHMG ONLY) (OUTPATIENT)
Dept: NEUROLOGY | Facility: CLINIC | Age: 28
End: 2023-09-18
Payer: MEDICAID

## 2023-09-27 ENCOUNTER — OFFICE VISIT (OUTPATIENT)
Dept: FAMILY MEDICINE CLINIC | Facility: CLINIC | Age: 28
End: 2023-09-27
Payer: MEDICAID

## 2023-09-27 VITALS
OXYGEN SATURATION: 98 % | DIASTOLIC BLOOD PRESSURE: 58 MMHG | HEIGHT: 65 IN | BODY MASS INDEX: 19.16 KG/M2 | TEMPERATURE: 97.5 F | HEART RATE: 78 BPM | SYSTOLIC BLOOD PRESSURE: 94 MMHG | WEIGHT: 115 LBS

## 2023-09-27 DIAGNOSIS — R55 VASOVAGAL ATTACK: ICD-10-CM

## 2023-09-27 DIAGNOSIS — R10.84 GENERALIZED ABDOMINAL PAIN: Primary | ICD-10-CM

## 2023-09-27 DIAGNOSIS — R11.0 NAUSEA: ICD-10-CM

## 2023-09-27 DIAGNOSIS — M67.432 GANGLION CYST OF DORSUM OF LEFT WRIST: ICD-10-CM

## 2023-09-27 PROBLEM — R10.9 ABDOMINAL PAIN: Status: ACTIVE | Noted: 2023-09-27

## 2023-09-27 PROCEDURE — 1160F RVW MEDS BY RX/DR IN RCRD: CPT | Performed by: STUDENT IN AN ORGANIZED HEALTH CARE EDUCATION/TRAINING PROGRAM

## 2023-09-27 PROCEDURE — 99214 OFFICE O/P EST MOD 30 MIN: CPT | Performed by: STUDENT IN AN ORGANIZED HEALTH CARE EDUCATION/TRAINING PROGRAM

## 2023-09-27 PROCEDURE — 1159F MED LIST DOCD IN RCRD: CPT | Performed by: STUDENT IN AN ORGANIZED HEALTH CARE EDUCATION/TRAINING PROGRAM

## 2023-09-27 RX ORDER — ONDANSETRON 4 MG/1
4 TABLET, ORALLY DISINTEGRATING ORAL EVERY 8 HOURS PRN
Qty: 20 TABLET | Refills: 2 | Status: SHIPPED | OUTPATIENT
Start: 2023-09-27

## 2023-09-27 NOTE — ASSESSMENT & PLAN NOTE
Chronic with acute worsening during menstrual cycles.  GYN suspects endometriosis which could be correlated with abd pain.   Recent CT A/P was normal with exception of physiologic free fluid.  Had negative UPT, G/C, urinalysis.  Follows with GI.  Discussed ibuprofen 800mg TID PRN (as prescribed by GYN) with addition of Tylenol 500-1000mg TID PRN for menstrual pain.   We also discussed IUD as an option for treatment of dysmenorrhea (previously did not like OCPs)

## 2023-09-27 NOTE — ASSESSMENT & PLAN NOTE
Has been evaluated by Neuro who are in agreement with vasovagal episodes, likely consistent with POTS.  Tilt Test negative. Although we discussed this does not definitively rule out POTS.  Continue hydration, liberal salt intake, compression socks.

## 2023-09-27 NOTE — PROGRESS NOTES
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    Physiologic pelvic fluid -noted incidentally on CT scan that was obtained for abdominal pain.  We further discussed her abdominal pain which is worse during the first day of her menstrual cycle.  GYN suspects endometriosis.  She has previously been on OCPs but did not like the way they made her feel.  It was recommended that she take ibuprofen 800 mg when these episodes happen.  She has tried this and feels like it is helpful but that she needs just slightly more medication.  However she is hesitant to take more ibuprofen.     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  She noticed that there was a small mass there.  It is tender to palpation.  Does not severely limit her daily life.  No injury.    Objective   Vital Signs:  BP 94/58 (BP Location: Right arm, Patient Position: Sitting, Cuff Size: Adult)   Pulse 78   Temp 97.5 °F (36.4 °C) (Infrared)   Ht 165.1 cm (65\")   Wt 52.2 kg (115 lb)   SpO2 98%   BMI 19.14 kg/m²   Estimated body mass index is 19.14 kg/m² as calculated from the following:    Height as of this encounter: 165.1 cm (65\").    Weight as of this encounter: 52.2 kg (115 lb).       BMI is within normal parameters. No other follow-up for BMI required.      Physical Exam  Constitutional:       General: She is not in acute distress.  Eyes:      Conjunctiva/sclera: Conjunctivae " normal.   Cardiovascular:      Rate and Rhythm: Normal rate and regular rhythm.   Pulmonary:      Effort: Pulmonary effort is normal. No respiratory distress.      Breath sounds: Normal breath sounds.   Abdominal:      General: There is no distension.      Tenderness: There is no abdominal tenderness.   Musculoskeletal:      Comments: Ganglion cyst present on dorsum of left wrist   Skin:     General: Skin is warm and dry.   Neurological:      Mental Status: She is alert and oriented to person, place, and time.   Psychiatric:         Mood and Affect: Mood normal.         Behavior: Behavior normal.      Result Review :  The following data was reviewed by: Jocelin Gordon MD on 09/27/2023:  Common labs          2/21/2023    15:20   Common Labs   WBC 5.8    Hemoglobin 12.5    Hematocrit 36.5    Platelets 202      Data reviewed : none             Assessment and Plan   Diagnoses and all orders for this visit:    1. Generalized abdominal pain (Primary)  Assessment & Plan:  Chronic with acute worsening during menstrual cycles.  GYN suspects endometriosis which could be correlated with abd pain.   Recent CT A/P was normal with exception of physiologic free fluid.  Had negative UPT, G/C, urinalysis.  Follows with GI.  Discussed ibuprofen 800mg TID PRN (as prescribed by GYN) with addition of Tylenol 500-1000mg TID PRN for menstrual pain.   We also discussed IUD as an option for treatment of dysmenorrhea (previously did not like OCPs)      2. Nausea  -     ondansetron ODT (ZOFRAN-ODT) 4 MG disintegrating tablet; Place 1 tablet on the tongue Every 8 (Eight) Hours As Needed for Nausea or Vomiting.  Dispense: 20 tablet; Refill: 2    3. Vasovagal attack  Assessment & Plan:  Has been evaluated by Neuro who are in agreement with vasovagal episodes, likely consistent with POTS.  Tilt Test negative. Although we discussed this does not definitively rule out POTS.  Continue hydration, liberal salt intake, compression socks.      4.  Ganglion cyst of dorsum of left wrist  Comments:  Provided reassurance. NSAIDs as above.             Follow Up   No follow-ups on file.  Patient was given instructions and counseling regarding her condition or for health maintenance advice. Please see specific information pulled into the AVS if appropriate.

## 2023-10-18 ENCOUNTER — OFFICE VISIT (OUTPATIENT)
Dept: FAMILY MEDICINE CLINIC | Facility: CLINIC | Age: 28
End: 2023-10-18
Payer: MEDICAID

## 2023-10-18 VITALS
OXYGEN SATURATION: 98 % | BODY MASS INDEX: 19.16 KG/M2 | HEART RATE: 85 BPM | TEMPERATURE: 98.2 F | HEIGHT: 65 IN | WEIGHT: 115 LBS | SYSTOLIC BLOOD PRESSURE: 106 MMHG | DIASTOLIC BLOOD PRESSURE: 64 MMHG

## 2023-10-18 DIAGNOSIS — R10.31 RLQ ABDOMINAL PAIN: Primary | ICD-10-CM

## 2023-10-18 PROCEDURE — 1159F MED LIST DOCD IN RCRD: CPT | Performed by: STUDENT IN AN ORGANIZED HEALTH CARE EDUCATION/TRAINING PROGRAM

## 2023-10-18 PROCEDURE — 99214 OFFICE O/P EST MOD 30 MIN: CPT | Performed by: STUDENT IN AN ORGANIZED HEALTH CARE EDUCATION/TRAINING PROGRAM

## 2023-10-18 PROCEDURE — 1160F RVW MEDS BY RX/DR IN RCRD: CPT | Performed by: STUDENT IN AN ORGANIZED HEALTH CARE EDUCATION/TRAINING PROGRAM

## 2023-10-18 RX ORDER — PROCHLORPERAZINE MALEATE 5 MG/1
5 TABLET ORAL EVERY 6 HOURS PRN
Qty: 30 TABLET | Refills: 0 | Status: SHIPPED | OUTPATIENT
Start: 2023-10-18

## 2023-10-19 LAB
ALBUMIN SERPL-MCNC: 5 G/DL (ref 4–5)
ALBUMIN/GLOB SERPL: 1.9 {RATIO} (ref 1.2–2.2)
ALP SERPL-CCNC: 64 IU/L (ref 44–121)
ALT SERPL-CCNC: 12 IU/L (ref 0–32)
AST SERPL-CCNC: 21 IU/L (ref 0–40)
B-HCG SERPL QL: NEGATIVE MIU/ML
BASOPHILS # BLD AUTO: 0 X10E3/UL (ref 0–0.2)
BASOPHILS NFR BLD AUTO: 1 %
BILIRUB SERPL-MCNC: 0.6 MG/DL (ref 0–1.2)
BUN SERPL-MCNC: 14 MG/DL (ref 6–20)
BUN/CREAT SERPL: 18 (ref 9–23)
CALCIUM SERPL-MCNC: 10 MG/DL (ref 8.7–10.2)
CHLORIDE SERPL-SCNC: 103 MMOL/L (ref 96–106)
CO2 SERPL-SCNC: 22 MMOL/L (ref 20–29)
CREAT SERPL-MCNC: 0.76 MG/DL (ref 0.57–1)
EGFRCR SERPLBLD CKD-EPI 2021: 110 ML/MIN/1.73
EOSINOPHIL # BLD AUTO: 0.2 X10E3/UL (ref 0–0.4)
EOSINOPHIL NFR BLD AUTO: 3 %
ERYTHROCYTE [DISTWIDTH] IN BLOOD BY AUTOMATED COUNT: 12.5 % (ref 11.7–15.4)
GLOBULIN SER CALC-MCNC: 2.6 G/DL (ref 1.5–4.5)
GLUCOSE SERPL-MCNC: 91 MG/DL (ref 70–99)
HCT VFR BLD AUTO: 39.3 % (ref 34–46.6)
HGB BLD-MCNC: 13.4 G/DL (ref 11.1–15.9)
IMM GRANULOCYTES # BLD AUTO: 0 X10E3/UL (ref 0–0.1)
IMM GRANULOCYTES NFR BLD AUTO: 0 %
LIPASE SERPL-CCNC: 28 U/L (ref 14–72)
LYMPHOCYTES # BLD AUTO: 2.5 X10E3/UL (ref 0.7–3.1)
LYMPHOCYTES NFR BLD AUTO: 34 %
MCH RBC QN AUTO: 30.9 PG (ref 26.6–33)
MCHC RBC AUTO-ENTMCNC: 34.1 G/DL (ref 31.5–35.7)
MCV RBC AUTO: 91 FL (ref 79–97)
MONOCYTES # BLD AUTO: 0.5 X10E3/UL (ref 0.1–0.9)
MONOCYTES NFR BLD AUTO: 7 %
NEUTROPHILS # BLD AUTO: 4 X10E3/UL (ref 1.4–7)
NEUTROPHILS NFR BLD AUTO: 55 %
PLATELET # BLD AUTO: 223 X10E3/UL (ref 150–450)
POTASSIUM SERPL-SCNC: 4 MMOL/L (ref 3.5–5.2)
PROT SERPL-MCNC: 7.6 G/DL (ref 6–8.5)
RBC # BLD AUTO: 4.33 X10E6/UL (ref 3.77–5.28)
SODIUM SERPL-SCNC: 142 MMOL/L (ref 134–144)
WBC # BLD AUTO: 7.3 X10E3/UL (ref 3.4–10.8)

## 2023-10-23 NOTE — PROGRESS NOTES
"Chief Complaint  Headache (Ongoing since Saturday /-come on and last all day) and Dizziness (Nausea and dizziness /-pt has not vomited but has been dry heaving  /-pt took 2 pregnancy tests (last one was last night -neg but anamaria says she is 5 days late))    Subjective        Letitia Hernandez presents to [facility]  History of Present Illness  28-year-old female with history of recurrent vasovagal episodes, chronic abdominal pain who presents for RLQ pain.    3-4 days of abdominal pain that is associated with headache, dizziness, nausea. No vomiting but has been dry heaving. Zofran is somewhat helpful. Menstrual cycle is irregular and currently 5 days late. She has taken 2 preg tests at home which were neg. 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     She does have history of abd cramping/pain prior to menstrual cycle and is concerned that maybe premenstrual symptoms have changed to more abd pain than pelvic cramping. GYN suspects possible endometriosis.     She follows with GI and has had negative recent CT abdomen pelvis.  She also had EGD in May 2023 which was normal but she did have some laxity noted at the GE junction.  She is on PPI. She has had multiple rounds of antibiotics related to upper respiratory infections (which she is prone to working at a ). She has been seen by GI who recommended gastric emptying study. She had to postpone appt due to recent illness.      Objective   Vital Signs:  /64 (BP Location: Right arm, Patient Position: Sitting, Cuff Size: Adult)   Pulse 85   Temp 98.2 °F (36.8 °C) (Infrared)   Ht 165.1 cm (65\")   Wt 52.2 kg (115 lb)   SpO2 98%   BMI 19.14 kg/m²   Estimated body mass index is 19.14 kg/m² as calculated from the following:    Height as of this encounter: 165.1 cm (65\").    Weight as of this encounter: 52.2 kg (115 lb).       BMI is within normal parameters. No other follow-up for BMI " required.      Physical Exam  Constitutional:       General: She is not in acute distress.  Eyes:      Conjunctiva/sclera: Conjunctivae normal.   Cardiovascular:      Rate and Rhythm: Normal rate and regular rhythm.   Pulmonary:      Effort: Pulmonary effort is normal. No respiratory distress.      Breath sounds: Normal breath sounds.   Abdominal:      General: Bowel sounds are normal. There is no distension.      Palpations: Abdomen is soft. There is no mass.      Tenderness: There is no guarding or rebound.      Comments: RLQ tenderness   Skin:     General: Skin is warm and dry.   Neurological:      Mental Status: She is alert and oriented to person, place, and time.   Psychiatric:         Mood and Affect: Mood normal.         Behavior: Behavior normal.        Result Review :  The following data was reviewed by: Jocelin Gordon MD on 10/18/2023:  Common labs          2/21/2023    15:20 10/18/2023    11:58   Common Labs   Glucose  91    BUN  14    Creatinine  0.76    Sodium  142    Potassium  4.0    Chloride  103    Calcium  10.0    Total Protein  7.6    Albumin  5.0    Total Bilirubin  0.6    Alkaline Phosphatase  64    AST (SGOT)  21    ALT (SGPT)  12    WBC 5.8  7.3    Hemoglobin 12.5  13.4    Hematocrit 36.5  39.3    Platelets 202  223      Data reviewed : none             Assessment and Plan   Diagnoses and all orders for this visit:    1. RLQ abdominal pain (Primary)  -     Comprehensive metabolic panel  -     Lipase  -     CBC Auto Differential  -     hCG, Serum, Qualitative    Other orders  -     prochlorperazine (COMPAZINE) 5 MG tablet; Take 1 tablet by mouth Every 6 (Six) Hours As Needed for Nausea or Vomiting.  Dispense: 30 tablet; Refill: 0  -     CBC & Differential    Ddx includes viral illness, endometriosis, gastritis, GERD, gastroparesis. Unlikely pancreatitis or cholelithiasis - will obtain labs to rule out. Pregnancy test negative at home but with delayed menstrual cycle will obtain pregnancy  test. Will switch zofran to compazine to see if better controls nausea. Recommend rest, BRAT diet, hydration. Continue to follow up with GI/GYN.         Follow Up   No follow-ups on file.  Patient was given instructions and counseling regarding her condition or for health maintenance advice. Please see specific information pulled into the AVS if appropriate.

## 2023-11-07 ENCOUNTER — OFFICE VISIT (OUTPATIENT)
Dept: FAMILY MEDICINE CLINIC | Facility: CLINIC | Age: 28
End: 2023-11-07
Payer: MEDICAID

## 2023-11-07 VITALS
HEIGHT: 65 IN | SYSTOLIC BLOOD PRESSURE: 96 MMHG | TEMPERATURE: 97.8 F | DIASTOLIC BLOOD PRESSURE: 60 MMHG | HEART RATE: 89 BPM | OXYGEN SATURATION: 98 % | WEIGHT: 117 LBS | BODY MASS INDEX: 19.49 KG/M2

## 2023-11-07 DIAGNOSIS — R22.1 NODULE OF NECK: ICD-10-CM

## 2023-11-07 DIAGNOSIS — J02.8 BACTERIAL PHARYNGITIS: Primary | ICD-10-CM

## 2023-11-07 DIAGNOSIS — B96.89 BACTERIAL PHARYNGITIS: Primary | ICD-10-CM

## 2023-11-07 DIAGNOSIS — Z11.3 ENCOUNTER FOR SCREENING EXAMINATION FOR SEXUALLY TRANSMITTED DISEASE: ICD-10-CM

## 2023-11-07 LAB
EXPIRATION DATE: NORMAL
INTERNAL CONTROL: NORMAL
Lab: NORMAL
S PYO AG THROAT QL: NEGATIVE

## 2023-11-07 PROCEDURE — 1159F MED LIST DOCD IN RCRD: CPT | Performed by: STUDENT IN AN ORGANIZED HEALTH CARE EDUCATION/TRAINING PROGRAM

## 2023-11-07 PROCEDURE — 1160F RVW MEDS BY RX/DR IN RCRD: CPT | Performed by: STUDENT IN AN ORGANIZED HEALTH CARE EDUCATION/TRAINING PROGRAM

## 2023-11-07 PROCEDURE — 99214 OFFICE O/P EST MOD 30 MIN: CPT | Performed by: STUDENT IN AN ORGANIZED HEALTH CARE EDUCATION/TRAINING PROGRAM

## 2023-11-07 PROCEDURE — 87880 STREP A ASSAY W/OPTIC: CPT | Performed by: STUDENT IN AN ORGANIZED HEALTH CARE EDUCATION/TRAINING PROGRAM

## 2023-11-07 RX ORDER — AMOXICILLIN 500 MG/1
500 CAPSULE ORAL 2 TIMES DAILY
Qty: 20 CAPSULE | Refills: 0 | Status: SHIPPED | OUTPATIENT
Start: 2023-11-07 | End: 2023-11-17

## 2023-11-07 NOTE — LETTER
November 7, 2023     Patient: Letitia Hernandez   YOB: 1995   Date of Visit: 11/7/2023       To Whom It May Concern:    It is my medical opinion that Letitia Hernandez is currently contagious from bacterial pharyngitis. She may return to work in two days after being on antibiotics for 24hours.          Sincerely,        Jocelin Gordon MD    CC: No Recipients

## 2023-11-07 NOTE — PROGRESS NOTES
"Chief Complaint  Cyst (Pt states she has a few cysts on neck and it hurts her at night ), Sore Throat (Possible strep/-pt states her throat feels \"thick\" and theres people around her with strep ), and Labs Only (Pt states she would like to get STD testing after previous partner was w other people /-all tests specifically the chlamydia test  )    Subjective        Letitia Hernandez presents to [facility]  History of Present Illness  28-year-old female with history of recurrent vasovagal episodes, chronic abdominal pain who presents for sore throat and lymphadenopathy.    Sore throat since Saturday.  She has noticed white spots on the back of her tonsils and tonsillar enlargement on the left side within the last day.  No fevers.  She has had some sick contacts with strep.  She also has left-sided lymphadenopathy.  She usually has enlarged lymph nodes on the side but has noticed that they are slightly larger.  They are also more tender and causing disruption in her sleep.    She requests STI testing as a previous partner was not monogamous.  She has no symptoms.    Abdominal pain is slightly better with taking Tylenol and ibuprofen together.  She is no longer having nausea/vomiting/diarrhea.      Objective   Vital Signs:  BP 96/60 (BP Location: Right arm, Patient Position: Sitting, Cuff Size: Adult)   Pulse 89   Temp 97.8 °F (36.6 °C) (Infrared)   Ht 165.1 cm (65\")   Wt 53.1 kg (117 lb)   SpO2 98%   BMI 19.47 kg/m²   Estimated body mass index is 19.47 kg/m² as calculated from the following:    Height as of this encounter: 165.1 cm (65\").    Weight as of this encounter: 53.1 kg (117 lb).       BMI is within normal parameters. No other follow-up for BMI required.      Physical Exam  Constitutional:       General: She is not in acute distress.  HENT:      Head: Normocephalic and atraumatic.      Nose: Congestion and rhinorrhea present.      Mouth/Throat:      Mouth: Mucous membranes are " moist.      Pharynx: Oropharyngeal exudate and posterior oropharyngeal erythema present.      Comments: Right tonsillar enlargement.  Eyes:      General: No scleral icterus.     Conjunctiva/sclera: Conjunctivae normal.   Cardiovascular:      Rate and Rhythm: Normal rate and regular rhythm.   Pulmonary:      Effort: Pulmonary effort is normal. No respiratory distress.   Lymphadenopathy:      Cervical: Cervical adenopathy present.   Skin:     General: Skin is warm and dry.   Neurological:      Mental Status: She is alert and oriented to person, place, and time.   Psychiatric:         Mood and Affect: Mood normal.         Behavior: Behavior normal.        Result Review :  The following data was reviewed by: Jocelin Gordon MD on 11/07/2023:  Common labs          2/21/2023    15:20 10/18/2023    11:58   Common Labs   Glucose  91    BUN  14    Creatinine  0.76    Sodium  142    Potassium  4.0    Chloride  103    Calcium  10.0    Total Protein  7.6    Albumin  5.0    Total Bilirubin  0.6    Alkaline Phosphatase  64    AST (SGOT)  21    ALT (SGPT)  12    WBC 5.8  7.3    Hemoglobin 12.5  13.4    Hematocrit 36.5  39.3    Platelets 202  223      Data reviewed : None             Assessment and Plan   Diagnoses and all orders for this visit:    1. Bacterial pharyngitis (Primary)  Comments:  Strep neg but recent exposures and exam consistent with bacterial etiology. Tx with amox.  Orders:  -     POCT rapid strep A  -     amoxicillin (AMOXIL) 500 MG capsule; Take 1 capsule by mouth 2 (Two) Times a Day for 10 days.  Dispense: 20 capsule; Refill: 0    2. Encounter for screening examination for sexually transmitted disease  -     Chlamydia trachomatis, Neisseria gonorrhoeae, Trichomonas vaginalis, PCR - Urine, Urine, Clean Catch  -     HIV-1 / O / 2 Ag / Antibody  -     RPR, Rfx Qn RPR / Confirm TP    3. Nodule of neck  Comments:  Likely cervical LAD given bacterial pharyngitis but have been longstanding and patient states present  prior to infection. Will further eval with ultrasound.  Orders:  -     US Head Neck Soft Tissue; Future             Follow Up   No follow-ups on file.  Patient was given instructions and counseling regarding her condition or for health maintenance advice. Please see specific information pulled into the AVS if appropriate.

## 2023-11-09 LAB
C TRACH RRNA SPEC QL NAA+PROBE: NEGATIVE
HIV 1+2 AB+HIV1 P24 AG SERPL QL IA: NON REACTIVE
N GONORRHOEA RRNA SPEC QL NAA+PROBE: NEGATIVE
RPR SER QL: NON REACTIVE
T VAGINALIS RRNA SPEC QL NAA+PROBE: NEGATIVE

## 2023-11-20 ENCOUNTER — TELEPHONE (OUTPATIENT)
Dept: FAMILY MEDICINE CLINIC | Facility: CLINIC | Age: 28
End: 2023-11-20
Payer: MEDICAID

## 2023-11-20 RX ORDER — FLUCONAZOLE 150 MG/1
TABLET ORAL
Qty: 2 TABLET | Refills: 0 | Status: SHIPPED | OUTPATIENT
Start: 2023-11-20 | End: 2023-11-27 | Stop reason: SDUPTHER

## 2023-11-20 NOTE — TELEPHONE ENCOUNTER
Caller: Letitia Hernandez    Relationship: Self    Best call back number: [phone]    What medication are you requesting: DIFLUCAN     What are your current symptoms: ITCHING     How long have you been experiencing symptoms: 3 DAYS    Have you had these symptoms before:    [x] Yes  [] No    Have you been treated for these symptoms before:   [x] Yes  [] No    If a prescription is needed, what is your preferred pharmacy and phone number: [id number] [address] AT [facility] [address] - [phone] [facility] [phone]      Additional notes:    PATIENT CALLED AND SAID THAT SHE HAD RECENTLY BEEN ON ANTIBIOTICS AND HAS DEVELOPED A YEAST INFECTION AS A RESULT, SHE IS WANTING TO SEE IF SHE CAN GET A PRESCRIPTION SENT IN

## 2023-11-21 DIAGNOSIS — R11.0 NAUSEA: ICD-10-CM

## 2023-11-21 RX ORDER — IBUPROFEN 800 MG/1
800 TABLET ORAL EVERY 8 HOURS PRN
Qty: 30 TABLET | Refills: 2 | Status: SHIPPED | OUTPATIENT
Start: 2023-11-21

## 2023-11-21 RX ORDER — ONDANSETRON 4 MG/1
4 TABLET, ORALLY DISINTEGRATING ORAL EVERY 8 HOURS PRN
Qty: 20 TABLET | Refills: 2 | Status: SHIPPED | OUTPATIENT
Start: 2023-11-21

## 2023-11-21 NOTE — TELEPHONE ENCOUNTER
Caller: Letitia Hernandez RENE    Relationship: Self    Best call back number: [phone]      Requested Prescriptions:   Requested Prescriptions     Pending Prescriptions Disp Refills    ibuprofen (ADVIL,MOTRIN) 800 MG tablet 30 tablet 2     Sig: Take 1 tablet by mouth Every 8 (Eight) Hours As Needed for Moderate Pain.    ondansetron ODT (ZOFRAN-ODT) 4 MG disintegrating tablet 20 tablet 2     Sig: Place 1 tablet on the tongue Every 8 (Eight) Hours As Needed for Nausea or Vomiting.        Pharmacy where request should be sent: [facility] #[id number] [facility] [address] AT [facility] [address] - [phone] [facility] [phone] FX     Last office visit with prescribing clinician: 11/7/2023   Last telemedicine visit with prescribing clinician: Visit date not found   Next office visit with prescribing clinician: Visit date not found     Additional details provided by patient: 4 DAYS LEFT OF ZOFRAN AND 5 DAYS LEFT OF IBUPROFEN    Does the patient have less than a 3 day supply:  [] Yes  [x] No    Would you like a call back once the refill request has been completed: [] Yes [x] No    If the office needs to give you a call back, can they leave a voicemail: [] Yes [x] No    Domingo Abraham Rep   11/21/23 10:02 EST

## 2023-11-22 ENCOUNTER — HOSPITAL ENCOUNTER (OUTPATIENT)
Dept: ULTRASOUND IMAGING | Facility: HOSPITAL | Age: 28
Discharge: HOME | End: 2023-11-22
Admitting: STUDENT IN AN ORGANIZED HEALTH CARE EDUCATION/TRAINING PROGRAM
Payer: MEDICAID

## 2023-11-22 DIAGNOSIS — R22.1 NODULE OF NECK: ICD-10-CM

## 2023-11-22 PROCEDURE — 76536 US EXAM OF HEAD AND NECK: CPT

## 2023-11-27 ENCOUNTER — TELEPHONE (OUTPATIENT)
Dept: FAMILY MEDICINE CLINIC | Facility: CLINIC | Age: 28
End: 2023-11-27
Payer: MEDICAID

## 2023-11-27 RX ORDER — FLUCONAZOLE 150 MG/1
TABLET ORAL
Qty: 2 TABLET | Refills: 0 | Status: SHIPPED | OUTPATIENT
Start: 2023-11-27

## 2023-11-27 NOTE — TELEPHONE ENCOUNTER
Rx Refill Note  Requested Prescriptions      No prescriptions requested or ordered in this encounter      Last office visit with prescribing clinician: 11/7/2023   Last telemedicine visit with prescribing clinician: Visit date not found   Next office visit with prescribing clinician: Visit date not found                         Would you like a call back once the refill request has been completed: [] Yes [] No    If the office needs to give you a call back, can they leave a voicemail: [] Yes [] No    Dwayne Eubanks CMA/LMR  11/27/23, 10:02 EST

## 2023-11-27 NOTE — TELEPHONE ENCOUNTER
Caller: Letitia Hernandez    Relationship: Self    Best call back number: [phone] OK TO LEAVE MESSAGE --ONLY AVAILABLE AT 11AM OR AFTER 4PM     What was the call regarding: PATIENT HAS MISPLACED ONE OF THE fluconazole (Diflucan) 150 MG tablet  AND IS STILL HAVING SYMPTOMS AND IS WANTING TO KNOW IF SHE SHOULD GET ANOTHER RX OR COME BACK IN. INSURANCE WILL      Is it okay if the provider responds through [employer]: YES    [id number] - [place], [place] - [address] AT [facility] [address] - [phone] [facility] [phone]  [phone]

## 2023-12-26 ENCOUNTER — OFFICE VISIT (OUTPATIENT)
Dept: FAMILY MEDICINE CLINIC | Facility: CLINIC | Age: 28
End: 2023-12-26
Payer: MEDICAID

## 2023-12-26 VITALS
SYSTOLIC BLOOD PRESSURE: 102 MMHG | HEIGHT: 65 IN | DIASTOLIC BLOOD PRESSURE: 60 MMHG | HEART RATE: 95 BPM | OXYGEN SATURATION: 99 % | WEIGHT: 123 LBS | TEMPERATURE: 97.5 F | BODY MASS INDEX: 20.49 KG/M2

## 2023-12-26 DIAGNOSIS — Z23 NEED FOR VACCINATION: ICD-10-CM

## 2023-12-26 DIAGNOSIS — K59.00 CONSTIPATION, UNSPECIFIED CONSTIPATION TYPE: Primary | ICD-10-CM

## 2023-12-26 DIAGNOSIS — N94.6 DYSMENORRHEA: ICD-10-CM

## 2023-12-26 PROCEDURE — 90715 TDAP VACCINE 7 YRS/> IM: CPT | Performed by: STUDENT IN AN ORGANIZED HEALTH CARE EDUCATION/TRAINING PROGRAM

## 2023-12-26 PROCEDURE — 99214 OFFICE O/P EST MOD 30 MIN: CPT | Performed by: STUDENT IN AN ORGANIZED HEALTH CARE EDUCATION/TRAINING PROGRAM

## 2023-12-26 PROCEDURE — 1160F RVW MEDS BY RX/DR IN RCRD: CPT | Performed by: STUDENT IN AN ORGANIZED HEALTH CARE EDUCATION/TRAINING PROGRAM

## 2023-12-26 PROCEDURE — 90686 IIV4 VACC NO PRSV 0.5 ML IM: CPT | Performed by: STUDENT IN AN ORGANIZED HEALTH CARE EDUCATION/TRAINING PROGRAM

## 2023-12-26 PROCEDURE — 90471 IMMUNIZATION ADMIN: CPT | Performed by: STUDENT IN AN ORGANIZED HEALTH CARE EDUCATION/TRAINING PROGRAM

## 2023-12-26 PROCEDURE — 90472 IMMUNIZATION ADMIN EACH ADD: CPT | Performed by: STUDENT IN AN ORGANIZED HEALTH CARE EDUCATION/TRAINING PROGRAM

## 2023-12-26 PROCEDURE — 1159F MED LIST DOCD IN RCRD: CPT | Performed by: STUDENT IN AN ORGANIZED HEALTH CARE EDUCATION/TRAINING PROGRAM

## 2023-12-26 RX ORDER — MELOXICAM 15 MG/1
15 TABLET ORAL DAILY
Qty: 30 TABLET | Refills: 0 | Status: SHIPPED | OUTPATIENT
Start: 2023-12-26

## 2023-12-26 RX ORDER — POLYETHYLENE GLYCOL 3350 17 G/17G
17 POWDER, FOR SOLUTION ORAL DAILY
Qty: 24 EACH | Refills: 0 | Status: SHIPPED | OUTPATIENT
Start: 2023-12-26

## 2023-12-26 NOTE — LETTER
December 26, 2023     Patient: Letitia Hernandez   YOB: 1995   Date of Visit: 12/26/2023       To Whom It May Concern:    It is my medical opinion that Letitia Hernandez may return to work in four days.            Sincerely,        Jocelin Gordon MD    CC: No Recipients

## 2023-12-28 ENCOUNTER — HOSPITAL ENCOUNTER (EMERGENCY)
Facility: HOSPITAL | Age: 28
Discharge: HOME | End: 2023-12-28
Attending: EMERGENCY MEDICINE | Admitting: EMERGENCY MEDICINE
Payer: MEDICAID

## 2023-12-28 ENCOUNTER — APPOINTMENT (OUTPATIENT)
Dept: CT IMAGING | Facility: HOSPITAL | Age: 28
End: 2023-12-28
Payer: MEDICAID

## 2023-12-28 VITALS
HEART RATE: 86 BPM | RESPIRATION RATE: 18 BRPM | SYSTOLIC BLOOD PRESSURE: 106 MMHG | WEIGHT: 120 LBS | BODY MASS INDEX: 19.99 KG/M2 | DIASTOLIC BLOOD PRESSURE: 63 MMHG | OXYGEN SATURATION: 97 % | HEIGHT: 65 IN | TEMPERATURE: 98.1 F

## 2023-12-28 DIAGNOSIS — N83.202 LEFT OVARIAN CYST: ICD-10-CM

## 2023-12-28 DIAGNOSIS — R10.32 ACUTE LEFT LOWER QUADRANT PAIN: Primary | ICD-10-CM

## 2023-12-28 LAB
ALBUMIN SERPL-MCNC: 4.9 G/DL (ref 3.5–5.2)
ALBUMIN/GLOB SERPL: 1.8 G/DL
ALP SERPL-CCNC: 61 U/L (ref 39–117)
ALT SERPL W P-5'-P-CCNC: 10 U/L (ref 1–33)
ANION GAP SERPL CALCULATED.3IONS-SCNC: 11.1 MMOL/L (ref 5–15)
AST SERPL-CCNC: 17 U/L (ref 1–32)
BASOPHILS # BLD AUTO: 0.02 10*3/MM3 (ref 0–0.2)
BASOPHILS NFR BLD AUTO: 0.4 % (ref 0–1.5)
BILIRUB SERPL-MCNC: 0.3 MG/DL (ref 0–1.2)
BILIRUB UR QL STRIP: NEGATIVE
BUN SERPL-MCNC: 13 MG/DL (ref 6–20)
BUN/CREAT SERPL: 18.3 (ref 7–25)
CALCIUM SPEC-SCNC: 9.7 MG/DL (ref 8.6–10.5)
CHLORIDE SERPL-SCNC: 103 MMOL/L (ref 98–107)
CLARITY UR: ABNORMAL
CO2 SERPL-SCNC: 25.9 MMOL/L (ref 22–29)
COLOR UR: YELLOW
CREAT SERPL-MCNC: 0.71 MG/DL (ref 0.57–1)
DEPRECATED RDW RBC AUTO: 39.8 FL (ref 37–54)
EGFRCR SERPLBLD CKD-EPI 2021: 119.7 ML/MIN/1.73
EOSINOPHIL # BLD AUTO: 0.13 10*3/MM3 (ref 0–0.4)
EOSINOPHIL NFR BLD AUTO: 2.6 % (ref 0.3–6.2)
ERYTHROCYTE [DISTWIDTH] IN BLOOD BY AUTOMATED COUNT: 12.1 % (ref 12.3–15.4)
GLOBULIN UR ELPH-MCNC: 2.7 GM/DL
GLUCOSE SERPL-MCNC: 102 MG/DL (ref 65–99)
GLUCOSE UR STRIP-MCNC: NEGATIVE MG/DL
HCG SERPL QL: NEGATIVE
HCT VFR BLD AUTO: 40.8 % (ref 34–46.6)
HGB BLD-MCNC: 13.1 G/DL (ref 12–15.9)
HGB UR QL STRIP.AUTO: NEGATIVE
HOLD SPECIMEN: NORMAL
IMM GRANULOCYTES # BLD AUTO: 0.01 10*3/MM3 (ref 0–0.05)
IMM GRANULOCYTES NFR BLD AUTO: 0.2 % (ref 0–0.5)
KETONES UR QL STRIP: NEGATIVE
LEUKOCYTE ESTERASE UR QL STRIP.AUTO: NEGATIVE
LIPASE SERPL-CCNC: 19 U/L (ref 13–60)
LYMPHOCYTES # BLD AUTO: 1.8 10*3/MM3 (ref 0.7–3.1)
LYMPHOCYTES NFR BLD AUTO: 35.4 % (ref 19.6–45.3)
MCH RBC QN AUTO: 29 PG (ref 26.6–33)
MCHC RBC AUTO-ENTMCNC: 32.1 G/DL (ref 31.5–35.7)
MCV RBC AUTO: 90.5 FL (ref 79–97)
MONOCYTES # BLD AUTO: 0.33 10*3/MM3 (ref 0.1–0.9)
MONOCYTES NFR BLD AUTO: 6.5 % (ref 5–12)
NEUTROPHILS NFR BLD AUTO: 2.79 10*3/MM3 (ref 1.7–7)
NEUTROPHILS NFR BLD AUTO: 54.9 % (ref 42.7–76)
NITRITE UR QL STRIP: NEGATIVE
NRBC BLD AUTO-RTO: 0 /100 WBC (ref 0–0.2)
PH UR STRIP.AUTO: 8 [PH] (ref 5–8)
PLATELET # BLD AUTO: 198 10*3/MM3 (ref 140–450)
PMV BLD AUTO: 9.1 FL (ref 6–12)
POTASSIUM SERPL-SCNC: 3.8 MMOL/L (ref 3.5–5.2)
PROT SERPL-MCNC: 7.6 G/DL (ref 6–8.5)
PROT UR QL STRIP: NEGATIVE
RBC # BLD AUTO: 4.51 10*6/MM3 (ref 3.77–5.28)
SODIUM SERPL-SCNC: 140 MMOL/L (ref 136–145)
SP GR UR STRIP: 1.02 (ref 1–1.03)
UROBILINOGEN UR QL STRIP: ABNORMAL
WBC NRBC COR # BLD AUTO: 5.08 10*3/MM3 (ref 3.4–10.8)
WHOLE BLOOD HOLD COAG: NORMAL
WHOLE BLOOD HOLD SPECIMEN: NORMAL

## 2023-12-28 PROCEDURE — 74176 CT ABD & PELVIS W/O CONTRAST: CPT

## 2023-12-28 PROCEDURE — 99284 EMERGENCY DEPT VISIT MOD MDM: CPT

## 2023-12-28 PROCEDURE — 84703 CHORIONIC GONADOTROPIN ASSAY: CPT

## 2023-12-28 PROCEDURE — 36415 COLL VENOUS BLD VENIPUNCTURE: CPT

## 2023-12-28 PROCEDURE — 81003 URINALYSIS AUTO W/O SCOPE: CPT

## 2023-12-28 PROCEDURE — 80053 COMPREHEN METABOLIC PANEL: CPT

## 2023-12-28 PROCEDURE — 83690 ASSAY OF LIPASE: CPT

## 2023-12-28 PROCEDURE — 85025 COMPLETE CBC W/AUTO DIFF WBC: CPT

## 2023-12-28 RX ORDER — SODIUM CHLORIDE 0.9 % (FLUSH) 0.9 %
10 SYRINGE (ML) INJECTION AS NEEDED
Status: DISCONTINUED | OUTPATIENT
Start: 2023-12-28 | End: 2023-12-28 | Stop reason: HOSPADM

## 2023-12-28 RX ORDER — IBUPROFEN 800 MG/1
800 TABLET ORAL ONCE
Status: COMPLETED | OUTPATIENT
Start: 2023-12-28 | End: 2023-12-28

## 2023-12-28 RX ADMIN — IBUPROFEN 800 MG: 800 TABLET, FILM COATED ORAL at 18:49

## 2023-12-28 NOTE — Clinical Note
[facility] EMERGENCY DEPARTMENT  [address] [facility] [address]  Phone: [phone]    Letitia Hernandez was seen and treated in our emergency department on 12/28/2023.  She may return to work on 12/29/2023.  Patient cannot lift anything over 5 pounds for the next 1 week       Thank you for choosing [facility].    Vin Ramirez MD

## 2023-12-28 NOTE — ED PROVIDER NOTES
EMERGENCY DEPARTMENT ENCOUNTER    Room Number:  09/09  PCP: Jocelin Gordon MD  Historian: Patient      HPI:  Chief Complaint: Lower abdominal pain  A complete HPI/ROS/PMH/PSH/SH/FH are unobtainable due to: Nothing  Context: Letitia Hernandez is a 28 y.o. female who presents to the ED by private vehicle from home c/o lower abdominal cramping for the past 1 week.  Pain is constant waxing and waning.  Pain is described as cramping.  Pain is worse in the left lower quadrant.  Pain improves with ibuprofen.  Patient's last menstrual period ended on 12/18.  Pain began a couple of days later.  She had a few episodes of nonbilious nonbloody vomiting last night.  She is nauseated today but has not had any further vomiting.  She reports decreased appetite but has been able to eat and drink some today.  She has had similar pain in the past and reports that her gynecologist suspects that she may have endometriosis.  She denies prior abdominal surgery.  Denies history of ovarian cyst.  Denies fever, chills, chest pain, shortness of breath, flank pain, dysuria, hematuria, abnormal vaginal bleeding, or vaginal discharge.            PAST MEDICAL HISTORY  Active Ambulatory Problems     Diagnosis Date Noted    Body mass index (BMI) of 19 or less in adult 04/18/2019    Vitamin D deficiency 02/21/2023    GERD (gastroesophageal reflux disease) 02/21/2023    Allergic rhinitis 02/21/2023    Bloating 03/23/2023    Epigastric pain 03/23/2023    Bilious vomiting with nausea 03/23/2023    Vasovagal attack 06/01/2023    Panic attack as reaction to stress 06/01/2023    Abdominal pain 09/27/2023     Resolved Ambulatory Problems     Diagnosis Date Noted    No Resolved Ambulatory Problems     Past Medical History:   Diagnosis Date    Dizziness     Environmental allergies     Gastric polyp     Pituitary cyst     Syncope          PAST SURGICAL HISTORY  Past Surgical History:   Procedure Laterality Date    ENDOSCOPY  02/10/2015    gastric polyp     ENDOSCOPY N/A 5/2/2023    Procedure: ESOPHAGOGASTRODUODENOSCOPY WITH BX;  Surgeon: Jitendra Parikh MD;  Location: [facility] OR;  Service: Gastroenterology;  Laterality: N/A;  Normal         FAMILY HISTORY  Family History   Problem Relation Age of Onset    Hypertension Mother     Hypertension Father     Seizures Brother     Cancer Maternal Aunt     Cancer Maternal Grandmother     Colon polyps Neg Hx     Crohn's disease Neg Hx     Colon cancer Neg Hx     Irritable bowel syndrome Neg Hx     Ulcerative colitis Neg Hx          SOCIAL HISTORY  Social History     Socioeconomic History    Marital status: Single   Tobacco Use    Smoking status: Never    Smokeless tobacco: Never    Tobacco comments:     tried smoking in high school, caffeine use maybe twice weekly   Vaping Use    Vaping Use: Never used   Substance and Sexual Activity    Alcohol use: Not Currently     Comment: socially    Drug use: Not Currently     Comment: marijuana twice monthly    Sexual activity: Defer     Partners: Male     Birth control/protection: Condom     Comment: not since 2019         ALLERGIES  Patient has no known allergies.    REVIEW OF SYSTEMS  All systems have been reviewed and are negative except for those listed in the HPI      PHYSICAL EXAM  ED Triage Vitals   Temp Heart Rate Resp BP SpO2   12/28/23 1331 12/28/23 1331 12/28/23 1331 12/28/23 1332 12/28/23 1331   98.1 °F (36.7 °C) 120 18 121/71 98 %      Temp src Heart Rate Source Patient Position BP Location FiO2 (%)   12/28/23 1331 -- -- -- --   Tympanic           Physical Exam      GENERAL: Awake, alert, oriented x 3.  Nontoxic-appearing female.  Resting comfortably in no acute distress  HENT: NCAT, nares patent, moist weeks membranes  EYES: no scleral icterus  CV: regular rhythm, normal rate  RESPIRATORY: normal effort, clear to auscultation bilaterally  ABDOMEN: soft, nondistended, mild left lower quadrant tenderness without rebound or guarding, no CVA tenderness  MUSCULOSKELETAL:  Extremities are nontender with full range of motion  NEURO: Speech is normal.  No facial droop.  Follows commands  PSYCH:  calm, cooperative  SKIN: warm, dry    Vital signs and nursing notes reviewed.          LAB RESULTS  Recent Results (from the past 24 hour(s))   Comprehensive Metabolic Panel    Collection Time: 12/28/23  1:41 PM    Specimen: Blood   Result Value Ref Range    Glucose 102 (H) 65 - 99 mg/dL    BUN 13 6 - 20 mg/dL    Creatinine 0.71 0.57 - 1.00 mg/dL    Sodium 140 136 - 145 mmol/L    Potassium 3.8 3.5 - 5.2 mmol/L    Chloride 103 98 - 107 mmol/L    CO2 25.9 22.0 - 29.0 mmol/L    Calcium 9.7 8.6 - 10.5 mg/dL    Total Protein 7.6 6.0 - 8.5 g/dL    Albumin 4.9 3.5 - 5.2 g/dL    ALT (SGPT) 10 1 - 33 U/L    AST (SGOT) 17 1 - 32 U/L    Alkaline Phosphatase 61 39 - 117 U/L    Total Bilirubin 0.3 0.0 - 1.2 mg/dL    Globulin 2.7 gm/dL    A/G Ratio 1.8 g/dL    BUN/Creatinine Ratio 18.3 7.0 - 25.0    Anion Gap 11.1 5.0 - 15.0 mmol/L    eGFR 119.7 >60.0 mL/min/1.73   Lipase    Collection Time: 12/28/23  1:41 PM    Specimen: Blood   Result Value Ref Range    Lipase 19 13 - 60 U/L   hCG, Serum, Qualitative    Collection Time: 12/28/23  1:41 PM    Specimen: Blood   Result Value Ref Range    HCG Qualitative Negative Negative   Green Top (Gel)    Collection Time: 12/28/23  1:41 PM   Result Value Ref Range    Extra Tube Hold for add-ons.    Lavender Top    Collection Time: 12/28/23  1:41 PM   Result Value Ref Range    Extra Tube hold for add-on    Light Blue Top    Collection Time: 12/28/23  1:41 PM   Result Value Ref Range    Extra Tube Hold for add-ons.    CBC Auto Differential    Collection Time: 12/28/23  1:41 PM    Specimen: Blood   Result Value Ref Range    WBC 5.08 3.40 - 10.80 10*3/mm3    RBC 4.51 3.77 - 5.28 10*6/mm3    Hemoglobin 13.1 12.0 - 15.9 g/dL    Hematocrit 40.8 34.0 - 46.6 %    MCV 90.5 79.0 - 97.0 fL    MCH 29.0 26.6 - 33.0 pg    MCHC 32.1 31.5 - 35.7 g/dL    RDW 12.1 (L) 12.3 - 15.4 %    RDW-SD  39.8 37.0 - 54.0 fl    MPV 9.1 6.0 - 12.0 fL    Platelets 198 140 - 450 10*3/mm3    Neutrophil % 54.9 42.7 - 76.0 %    Lymphocyte % 35.4 19.6 - 45.3 %    Monocyte % 6.5 5.0 - 12.0 %    Eosinophil % 2.6 0.3 - 6.2 %    Basophil % 0.4 0.0 - 1.5 %    Immature Grans % 0.2 0.0 - 0.5 %    Neutrophils, Absolute 2.79 1.70 - 7.00 10*3/mm3    Lymphocytes, Absolute 1.80 0.70 - 3.10 10*3/mm3    Monocytes, Absolute 0.33 0.10 - 0.90 10*3/mm3    Eosinophils, Absolute 0.13 0.00 - 0.40 10*3/mm3    Basophils, Absolute 0.02 0.00 - 0.20 10*3/mm3    Immature Grans, Absolute 0.01 0.00 - 0.05 10*3/mm3    nRBC 0.0 0.0 - 0.2 /100 WBC   Urinalysis With Microscopic If Indicated (No Culture) - Urine, Clean Catch    Collection Time: 12/28/23  1:46 PM    Specimen: Urine, Clean Catch   Result Value Ref Range    Color, UA Yellow Yellow, Straw    Appearance, UA Cloudy (A) Clear    pH, UA 8.0 5.0 - 8.0    Specific Gravity, UA 1.022 1.005 - 1.030    Glucose, UA Negative Negative    Ketones, UA Negative Negative    Bilirubin, UA Negative Negative    Blood, UA Negative Negative    Protein, UA Negative Negative    Leuk Esterase, UA Negative Negative    Nitrite, UA Negative Negative    Urobilinogen, UA 0.2 E.U./dL 0.2 - 1.0 E.U./dL       Ordered the above labs and reviewed the results.        RADIOLOGY  CT Abdomen Pelvis Without Contrast    Result Date: 12/28/2023  CT ABDOMEN PELVIS WO CONTRAST-  INDICATIONS: Pain  TECHNIQUE: Radiation dose reduction techniques were utilized, including automated exposure control and exposure modulation based on body size. Unenhanced ABDOMEN AND PELVIS CT  COMPARISON: 8/23/2023  FINDINGS:  Unremarkable unenhanced appearance of the liver, spleen, adrenal glands, pancreas, kidneys, bladder. Likely dominant follicle or cyst at the left adnexa, 1.8 cm.  No bowel obstruction or abnormal bowel thickening is identified.  No free intraperitoneal gas. Small pelvic free fluid.  Scattered small mesenteric and para-aortic lymph nodes  are seen that are not significant by size criteria.  Abdominal aorta is not aneurysmal.  The lung bases are clear.  Degenerative endplate changes are seen at T9/10. No acute fracture is identified.         1. No urolithiasis or hydronephrosis. Likely dominant follicle or cyst of the left adnexa.  2. No focal acute inflammatory process of bowel is identified. Small nonspecific pelvic free fluid. Follow-up as indications persist.  This report was finalized on 12/28/2023 5:28 PM by Dr. López Cabrera M.D on Workstation: [employer]       Ordered the above noted radiological studies. Reviewed by me in PACS.            PROCEDURES  Procedures              MEDICATIONS GIVEN IN ER  Medications   ibuprofen (ADVIL,MOTRIN) tablet 800 mg (800 mg Oral Given 12/28/23 1849)                   MEDICAL DECISION MAKING, PROGRESS, and CONSULTS    All labs have been independently reviewed by me.  All radiology studies have been reviewed by me and I have also reviewed the radiology report.   EKG's independently viewed and interpreted by me.  Discussion below represents my analysis of pertinent findings related to patient's condition, differential diagnosis, treatment plan and final disposition.      Additional sources:  - Discussed/ obtained information from independent historians: None    - External (non-ED) record review: Patient saw her PCP on 12/26/2023 for lower abdominal cramping.  She has a history of ovarian cyst.  She was diagnosed with constipation and dysmenorrhea.  She was given prescriptions for MiraLAX and Mobic..  She had upper endoscopy in May 2023 which was normal    - Chronic or social conditions impacting care: None          Orders placed during this visit:  Orders Placed This Encounter   Procedures    CT Abdomen Pelvis Without Contrast    [place] Draw    Comprehensive Metabolic Panel    Lipase    Urinalysis With Microscopic If Indicated (No Culture) - Urine, Clean Catch    hCG, Serum, Qualitative    CBC Auto  Differential    CBC & Differential    Green Top (Gel)    Lavender Top    Light Blue Top         Additional orders considered but not ordered:  None        Differential diagnosis:    Differential diagnosis includes but is not limited to:  - hepatobiliary pathology such as cholecystitis, cholangitis, and symptomatic cholelithiasis  - Pancreatitis  - Dyspepsia  - Small bowel obstruction  - Appendicitis  - Diverticulitis  - UTI including pyelonephritis  - Ureteral stone  - Zoster  - Colitis, including infectious and ischemic  - Atypical ACS        Independent interpretation of labs, radiology studies, and discussions with consultants:  ED Course as of 12/28/23 2138   Thu Dec 28, 2023   1544 HCG Qualitative: Negative [WH]   1544 WBC: 5.08 [WH]   1544 Hemoglobin: 13.1 [WH]   1544 Urinalysis is unremarkable [WH]   1651 CT abdomen/pelvis personally interpreted by me.  My personal interpretation is: Lung bases are clear.  No bowel obstruction.  No obstructive uropathy [WH]   1737 Per the radiologist, CT abdomen/pelvis shows a likely dominant follicle or cyst at the left adnexa measuring 1.8 cm.  There is a small amount of pelvic free fluid.  No bowel obstruction.  No free air.  See dictated report for details. [WH]   1844 Test results discussed with the patient.  She is resting comfortably.  She will be given a dose of Motrin prior to discharge.  She has Mobic at home that she has not been taking.  I recommended that she take this or ibuprofen 600 to 800 mg every 6 hours.  Patient was advised to follow-up with her gynecologist.  Return precautions were discussed [WH]   1903 MDM: Patient presented to ED complaining of lower abdominal pain for the past 1 week.  Symptoms began several days after her last menstrual period ended.  She has had similar symptoms in the past that were felt to be due to endometriosis although she has never had a confirmatory laparoscopy.  On exam, there is mild left lower quadrant tenderness but she  did not have an acute abdomen.  Her labs are unremarkable.  She did not have a UTI.  She was not pregnant.  CT scan showed a small left ovarian cyst.  Plan is for symptomatic treatment and gynecology follow-up. [WH]      ED Course User Index  [WH] Vin Ramirez MD               DIAGNOSIS  Final diagnoses:   Acute left lower quadrant pain   Left ovarian cyst         DISPOSITION  DISCHARGE    Patient discharged in stable condition.    Reviewed implications of results, diagnosis, meds, responsibility to follow up, warning signs and symptoms of possible worsening, potential complications and reasons to return to ER, including worsening/persistent pain, fever, vomiting, diarrhea, or other concern.    Patient/Family voiced understanding of above instructions.    Discussed plan for discharge, as there is no emergent indication for admission. Patient referred to primary care provider for BP management due to today's BP. Pt/family is agreeable and understands need for follow up and repeat testing.  Pt is aware that discharge does not mean that nothing is wrong but it indicates no emergency is present that requires admission and they must continue care with follow-up as given below or physician of their choice.     FOLLOW-UP  Jocelin Gordon MD  [address] [address]  [phone]    Schedule an appointment as soon as possible for a visit   If symptoms persist    Your gynecologist    Schedule an appointment as soon as possible for a visit            Medication List      No changes were made to your prescriptions during this visit.                   Latest Documented Vital Signs:  As of 21:38 EST  BP- 106/63 HR- 86 Temp- 98.1 °F (36.7 °C) (Tympanic) O2 sat- 97%              --    Please note that portions of this were completed with a voice recognition program.       Note Disclaimer: At [facility], we believe that sharing information builds trust and better relationships. You are receiving  this note because you are receiving care at [facility] or recently visited. It is possible you will see health information before a provider has talked with you about it. This kind of information can be easy to misunderstand. To help you fully understand what it means for your health, we urge you to discuss this note with your provider.             Vin Ramirez MD  12/28/23 0756

## 2023-12-28 NOTE — DISCHARGE INSTRUCTIONS
Take meloxicam/Mobic as previously prescribed.  Follow-up with your gynecologist soon as possible.  Return to the emergency department if worsening pain, nausea, vomiting, fever, or other concern

## 2023-12-29 ENCOUNTER — TELEPHONE (OUTPATIENT)
Dept: FAMILY MEDICINE CLINIC | Facility: CLINIC | Age: 28
End: 2023-12-29

## 2023-12-29 NOTE — TELEPHONE ENCOUNTER
CT showed dominant follicle or ovarian cyst. A dominant follicle is very normal as part of a woman's menstrual cycle. If cyst, it can rupture and cause pain/bleeding. No diet or physical restrictions necessary at this time. If severe abdominal pain or extensive bleeding were to occur, recommend urgent GYN/ER evaluation.

## 2023-12-29 NOTE — TELEPHONE ENCOUNTER
Caller: Letitia Hernandez    Relationship: Self    Best call back number: [phone]    What is the best time to reach you: AFTER 10:30 AM    Who are you requesting to speak with (clinical staff, provider,  specific staff member): CLINICAL    What was the call regarding: PATIENT WAS ON THE ER YESTERDAY 12/28/23 AND WAS TOLD SHE HAS AN OVARIAN CYST. PATIENT WAS REVIEWING THE PAPERWORK AND SOMETHING DID NOT MAKE SENSE TO HER AS WELL AS THE RESULTS ABOUT THE CYST. PATIENT IS WANTING TO KNOW IF SHE CAN RECEIVE A CALLBACK TO HAVE THE RESULTS EXPLAINED A LITTLE MORE AND THE QUESTIONS SHE HAD AS WELL AS IF THERE ARE ANY PHYSICAL RESTRICTIONS AS SHE WORKS WITH [employer] OR SHOULD SHE WATCH HER DIET.(ENCOUNTER IS IN CHART FROM YESTERDAY)

## 2024-01-05 ENCOUNTER — OFFICE VISIT (OUTPATIENT)
Dept: OBSTETRICS AND GYNECOLOGY | Age: 29
End: 2024-01-05
Payer: MEDICAID

## 2024-01-05 VITALS
BODY MASS INDEX: 20.16 KG/M2 | WEIGHT: 121 LBS | DIASTOLIC BLOOD PRESSURE: 62 MMHG | HEIGHT: 65 IN | SYSTOLIC BLOOD PRESSURE: 110 MMHG

## 2024-01-05 DIAGNOSIS — N83.202 CYST OF LEFT OVARY: Primary | ICD-10-CM

## 2024-01-05 DIAGNOSIS — Z13.89 SCREENING FOR BLOOD OR PROTEIN IN URINE: ICD-10-CM

## 2024-01-05 LAB
BILIRUB BLD-MCNC: NEGATIVE MG/DL
CLARITY, POC: CLEAR
COLOR UR: YELLOW
GLUCOSE UR STRIP-MCNC: NEGATIVE MG/DL
KETONES UR QL: NEGATIVE
LEUKOCYTE EST, POC: NEGATIVE
NITRITE UR-MCNC: NEGATIVE MG/ML
PH UR: 6 [PH] (ref 5–8)
PROT UR STRIP-MCNC: NEGATIVE MG/DL
RBC # UR STRIP: NEGATIVE /UL
SP GR UR: 1.03 (ref 1–1.03)
UROBILINOGEN UR QL: NORMAL

## 2024-01-05 NOTE — PROGRESS NOTES
"Subjective     Chief Complaint   Patient presents with    Gynecologic Exam     New gyn, pt was seen in ED 23 for lower abdominal pain and has a 1.8 cm follicle/cyst, pt was diagnosed with dysmenorrhea and told to follow up with her gyn as soon as possible       Letitia Hernandez is a 28 y.o.  whose LMP is Patient's last menstrual period was 12/15/2023 (exact date). 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   She was taking 800mg Advil and tylenol this helped for around 6 hours  She is now taking mobic 15mg it has helped   She does not want birth control   She has been having some diarrhea and constipation  She's sees  GI regularly   She also has POTS   She has multiple cyst in varying areas of her body   Has been dealing with this since         The following portions of the patient's history were reviewed and updated as appropriate:vital signs, allergies, current medications, past medical history, past social history, past surgical history, and problem list      Review of Systems   Pertinent items are noted in HPI.     Objective      /62   Ht 165.1 cm (65\")   Wt 54.9 kg (121 lb)   LMP 12/15/2023 (Exact Date)   BMI 20.14 kg/m²     Physical Exam    General:   alert   Heart: Not performed today   Lungs: Not performed today.   Breast: Not performed today   Neck: Not performed today   Abdomen: Not performed today   CVA: Not performed today   Pelvis: Not performed today   Extremities: Not performed today   Neurologic: AOx3. Gait normal. Reflexes and motor strength normal and symmetric. Cranial nerves 2-12 and sensation grossly intact.   Psychiatric: Normal affect, judgement, and mood       Lab Review   Labs: No data reviewed    Imaging   Ultrasound - Pelvic Vaginal, CT Abdomen Pelvis Without Contrast (2023 " 16:45)     Assessment & Plan     ASSESSMENT  1. Cyst of left ovary         Impression:    1.  Uterus: Normal size    2.  Endometrium: Normal non-menopausal thickness and 5.2 mm     3.  Myometrium: Normal homogenous texture     4.  Ovaries   Left: 1.9 cm x 2.5 cm x 1.9 cm CL appearing cyst    Right: Normal/unremarkable     PLAN  1. No orders of the defined types were placed in this encounter.      2. Medications prescribed this encounter:      No orders of the defined types were placed in this encounter.      3.  Small corpus luteum appearing cyst on left ovary discussed these typically resolve on their own if she has continued pelvic pain may repeat imaging in 4 to 6 weeks I would continue Mobic encourage stretching we had discussed birth control method she would like to avoid this for now all questions answered and addressed I would recommend returning for an AE    Follow up: 1 year(s)    Jamee Poe, APRN  1/5/2024

## 2024-01-08 ENCOUNTER — TELEPHONE (OUTPATIENT)
Dept: OBSTETRICS AND GYNECOLOGY | Age: 29
End: 2024-01-08
Payer: MEDICAID

## 2024-01-08 NOTE — TELEPHONE ENCOUNTER
LM notifying pt of response from Melissa Poe:  Pt needs to continue taking the mobic. Pelvic ultrasound was clear on Friday and it sounds like she may just be getting her period. If she starts developing any kind of odor then she can come in for a swab, but other than that everything came back normal and she needs to continue the mobic.

## 2024-01-08 NOTE — TELEPHONE ENCOUNTER
"Pt states she saw Melissa Poe on Friday for an ovarian cyst.  Pt states over the weekend she had pink discharge which turned red w/clots and now is a nash brown color.  She is also having a lot of cramping and lingering \"pinching\" feeling on her left side.  She states she has lower back pain on her left side near her tailbone.  She is c/o breast tenderness and being more emotional over the last week.  Please advise.  "

## 2024-01-08 NOTE — TELEPHONE ENCOUNTER
Pt needs to continue taking the mobic. Pelvic ultrasound was clear on Friday and it sounds like she may just be getting her period. If she starts developing any kind of odor then she can come in for a swab, but other than that everything came back normal and she needs to continue the mobic. [place]

## 2024-01-10 ENCOUNTER — TELEPHONE (OUTPATIENT)
Dept: FAMILY MEDICINE CLINIC | Facility: CLINIC | Age: 29
End: 2024-01-10

## 2024-01-10 NOTE — TELEPHONE ENCOUNTER
Options would include taking 2 extra-strength tylenol in addition to meloxicam or trying ibuprofen/aleve instead of meloxicam. If pain is very severe, I would recommend eval at ER to evaluate for ovarian torsion/cyst rupture.

## 2024-01-10 NOTE — TELEPHONE ENCOUNTER
Caller: Letitia Hernandez    Relationship: Self    Best call back number: [phone]    Which medication are you concerned about: meloxicam (MOBIC) 15 MG tablet     Who prescribed you this medication: CANDY    What are your concerns: IT DOESN'T SEEM TO BE WORKING ANY MORE.  IS THERE ANYTHING ELSE YOU CAN GIVE HER FOR THE PAIN?

## 2024-01-30 ENCOUNTER — TELEPHONE (OUTPATIENT)
Dept: OBSTETRICS AND GYNECOLOGY | Age: 29
End: 2024-01-30

## 2024-01-30 ENCOUNTER — OFFICE VISIT (OUTPATIENT)
Dept: OBSTETRICS AND GYNECOLOGY | Age: 29
End: 2024-01-30
Payer: MEDICAID

## 2024-01-30 VITALS — HEIGHT: 65 IN | WEIGHT: 121 LBS | BODY MASS INDEX: 20.16 KG/M2

## 2024-01-30 DIAGNOSIS — R10.2 PELVIC PAIN: Primary | ICD-10-CM

## 2024-01-30 DIAGNOSIS — N94.10 FEMALE DYSPAREUNIA: ICD-10-CM

## 2024-01-30 DIAGNOSIS — N94.6 DYSMENORRHEA: ICD-10-CM

## 2024-01-30 RX ORDER — MELOXICAM 15 MG/1
15 TABLET ORAL DAILY
Qty: 30 TABLET | Refills: 0 | Status: SHIPPED | OUTPATIENT
Start: 2024-01-30

## 2024-01-30 NOTE — PROGRESS NOTES
"Subjective     Chief Complaint   Patient presents with    Follow-up     Pt states she is still having pelvic pain that is rated at a 6/10 daily and when she tries to come off the meloxicam the pain is 8/10       Letitia Hernandez is a 28 y.o.  whose LMP is Patient's last menstrual period was 2024 (exact date). 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     Objective      Ht 165.1 cm (65\")   Wt 54.9 kg (121 lb)   LMP 2024 (Exact Date)   BMI 20.14 kg/m²     Physical Exam    General:   alert   Heart: Not performed today   Lungs: Not performed today.   Breast: Not performed today   Neck: Not performed today   Abdomen: Not performed today   CVA: Not performed today   Pelvis: Bimanual unremarkable some tenderness noted midline with palpation no CMT thin white discharge present    Extremities: Extremities normal, atraumatic, no cyanosis or edema   Neurologic: AOx3. Gait normal. Reflexes and motor strength normal and symmetric. Cranial nerves 2-12 and sensation grossly intact.   Psychiatric: Normal affect, judgement, and mood       Lab Review   Labs: No data reviewed    Imaging   No data reviewed    Assessment & Plan     ASSESSMENT  1. Pelvic pain    2. Dysmenorrhea    3. Female dyspareunia          PLAN  1.   Orders Placed This Encounter   Procedures    NuSwab VG+ - Swab, Vagina "       2. Medications prescribed this encounter:        New Medications Ordered This Visit   Medications    meloxicam (MOBIC) 15 MG tablet     Sig: Take 1 tablet by mouth Daily.     Dispense:  30 tablet     Refill:  0       3. Returning tomorrow for a TVUS   Refilled mobic or may take tylenol  Offered and recommended continuous dose bcp she will consider   Nuswab to r/o infection    Follow up: 1 day(s)    Jamee Poe, APRN  1/30/2024

## 2024-02-08 ENCOUNTER — PREP FOR SURGERY (OUTPATIENT)
Dept: SURGERY | Facility: SURGERY CENTER | Age: 29
End: 2024-02-08

## 2024-02-08 ENCOUNTER — OFFICE VISIT (OUTPATIENT)
Dept: GASTROENTEROLOGY | Facility: CLINIC | Age: 29
End: 2024-02-08

## 2024-02-08 VITALS
WEIGHT: 120 LBS | SYSTOLIC BLOOD PRESSURE: 100 MMHG | TEMPERATURE: 97.1 F | OXYGEN SATURATION: 96 % | BODY MASS INDEX: 19.99 KG/M2 | DIASTOLIC BLOOD PRESSURE: 60 MMHG | HEIGHT: 65 IN | HEART RATE: 91 BPM

## 2024-02-08 DIAGNOSIS — R10.32 LEFT LOWER QUADRANT ABDOMINAL PAIN: Primary | ICD-10-CM

## 2024-02-08 DIAGNOSIS — K21.9 GASTROESOPHAGEAL REFLUX DISEASE WITHOUT ESOPHAGITIS: ICD-10-CM

## 2024-02-08 DIAGNOSIS — R19.4 CHANGE IN BOWEL HABITS: ICD-10-CM

## 2024-02-08 PROCEDURE — 99214 OFFICE O/P EST MOD 30 MIN: CPT | Performed by: NURSE PRACTITIONER

## 2024-02-08 RX ORDER — SODIUM CHLORIDE 0.9 % (FLUSH) 0.9 %
3 SYRINGE (ML) INJECTION EVERY 12 HOURS SCHEDULED
OUTPATIENT
Start: 2024-02-08

## 2024-02-08 RX ORDER — SODIUM CHLORIDE 0.9 % (FLUSH) 0.9 %
10 SYRINGE (ML) INJECTION AS NEEDED
OUTPATIENT
Start: 2024-02-08

## 2024-02-08 RX ORDER — SODIUM CHLORIDE, SODIUM LACTATE, POTASSIUM CHLORIDE, CALCIUM CHLORIDE 600; 310; 30; 20 MG/100ML; MG/100ML; MG/100ML; MG/100ML
30 INJECTION, SOLUTION INTRAVENOUS CONTINUOUS PRN
OUTPATIENT
Start: 2024-02-08

## 2024-02-08 RX ORDER — FAMOTIDINE 20 MG/1
20 TABLET, FILM COATED ORAL NIGHTLY
Qty: 30 TABLET | Refills: 2 | Status: SHIPPED | OUTPATIENT
Start: 2024-02-08

## 2024-02-08 RX ORDER — DICYCLOMINE HYDROCHLORIDE 10 MG/1
10 CAPSULE ORAL 3 TIMES DAILY PRN
Qty: 90 CAPSULE | Refills: 5 | Status: SHIPPED | OUTPATIENT
Start: 2024-02-08

## 2024-02-08 NOTE — PATIENT INSTRUCTIONS
Schedule colonoscopy for further evaluation.     For abdominal cramping/diarrhea, begin trial of dicyclomine as prescribed. Prescription sent to pharmacy.     Recommend starting a daily fiber supplement such as Citrucel, Metamucil, or FiberCon available over-the-counter.     For GERD, recommend starting pepcid daily at bedtime.

## 2024-02-08 NOTE — PROGRESS NOTES
Chief Complaint   Patient presents with    Abdominal Pain         History of Present Illness  Patient is a 28-year-old female who presents today for Follow-up.  She was last seen in the office August 2023 with concerns about epigastric pain and bloating.  Symptoms were felt to represent GERD and gastritis.  She had a CT scan performed with no acute findings.  Gastric emptying study was recommended, this was not completed.  She had an ER visit in December for left lower quadrant pain which showed a left ovarian cyst versus dominant follicle, small nonspecific pelvic free fluid.     Patient presents today for evaluation with concerns about left lower quadrant pain.  Symptoms began around 2 months ago.  She was seen in the ER due to the pain and a CT scan was performed which showed a left ovarian cyst.  She has followed up with her gynecologist for this and the cyst has resolved but the pain has continued.    Reports the pain has been constant to her left lower quadrant over the last 2 months.  It has waxed and waned in intensity.  She describes it as a cramping or pinching sensation.  It has been associated with abdominal bloating.    Associated with this she is also noted some change in bowel habits, reports bowels have been moving regularly.  Generally has a bowel movement at least once per day but has alternated somewhat between diarrhea and constipation.    She has also noted worsening reflux symptoms, symptoms are generally worse in the morning.     Result Review :       CT Abdomen Pelvis Without Contrast (12/28/2023 16:45)    CT Abdomen Pelvis With Contrast (08/23/2023 09:37)    Office Visit with Lyla Hawk APRN (08/22/2023)    Tissue Pathology Exam (05/02/2023 09:23)    UPPER GI ENDOSCOPY (05/02/2023 09:13)    Office Visit with Lyla Hawk APRN (05/16/2023)    Tissue Pathology Exam (05/02/2023 09:23)     UPPER GI ENDOSCOPY (05/02/2023 09:13)     UPPER GI ENDOSCOPY (05/02/2023 09:13)     NM  "hepatobiliary w cck (02/12/2016 08:21)     Vital Signs:   /60   Pulse 91   Temp 97.1 °F (36.2 °C)   Ht 165.1 cm (65\")   Wt 54.4 kg (120 lb)   SpO2 96%   BMI 19.97 kg/m²     Body mass index is 19.97 kg/m².     Physical Exam  Vitals reviewed.   Constitutional:       General: She is not in acute distress.     Appearance: She is well-developed.   HENT:      Head: Normocephalic and atraumatic.   Pulmonary:      Effort: Pulmonary effort is normal. No respiratory distress.   Abdominal:      General: Abdomen is flat. Bowel sounds are normal. There is no distension.      Palpations: Abdomen is soft.      Tenderness: There is abdominal tenderness in the left lower quadrant.   Skin:     General: Skin is dry.      Coloration: Skin is not pale.   Neurological:      Mental Status: She is alert and oriented to person, place, and time.   Psychiatric:         Thought Content: Thought content normal.           Assessment and Plan    Diagnoses and all orders for this visit:    1. Left lower quadrant abdominal pain (Primary)    2. Change in bowel habits    3. Gastroesophageal reflux disease without esophagitis    Other orders  -     dicyclomine (BENTYL) 10 MG capsule; Take 1 capsule by mouth 3 (Three) Times a Day As Needed (abdominal pain/diarrhea).  Dispense: 90 capsule; Refill: 5  -     famotidine (PEPCID) 20 MG tablet; Take 1 tablet by mouth Every Night.  Dispense: 30 tablet; Refill: 2         Discussion  Patient presents today for evaluation with concerns about left lower quadrant pain and change in bowel habits.  Recommended colonoscopy for further evaluation to assess for any evidence of colitis or structural abnormality that could be contributing to her discomfort.  While this is being completed, will provide prescription for dicyclomine to try for pain.  Due to worsening reflux symptoms, recommended adding in Pepcid daily in the evening.          Follow Up   Return for Follow up to review results after testing " complete.    Patient Instructions   Schedule colonoscopy for further evaluation.     For abdominal cramping/diarrhea, begin trial of dicyclomine as prescribed. Prescription sent to pharmacy.     Recommend starting a daily fiber supplement such as Citrucel, Metamucil, or FiberCon available over-the-counter.     For GERD, recommend starting pepcid daily at bedtime.

## 2024-02-16 ENCOUNTER — TELEPHONE (OUTPATIENT)
Dept: OBSTETRICS AND GYNECOLOGY | Age: 29
End: 2024-02-16
Payer: MEDICAID

## 2024-02-16 NOTE — TELEPHONE ENCOUNTER
Per DN pt was advised to go to the ER. PT  started period 8 days agao and also took a Plan B on Sunday. PT states within the last 2-3 days she has bled through at least 6-7 pads a day. Pt was advised to go to the ER per DN

## 2024-03-01 PROBLEM — R19.4 CHANGE IN BOWEL HABITS: Status: ACTIVE | Noted: 2024-02-08

## 2024-03-08 ENCOUNTER — TELEPHONE (OUTPATIENT)
Dept: FAMILY MEDICINE CLINIC | Facility: CLINIC | Age: 29
End: 2024-03-08
Payer: MEDICAID

## 2024-03-08 RX ORDER — FLUCONAZOLE 150 MG/1
150 TABLET ORAL ONCE
Qty: 2 TABLET | Refills: 0 | Status: SHIPPED | OUTPATIENT
Start: 2024-03-08 | End: 2024-03-08

## 2024-03-08 NOTE — TELEPHONE ENCOUNTER
Caller: Letitia Hernandez    Relationship: Self    Best call back number: [phone]    What medication are you requesting: SOMETHING FOR YEAST INFECTION    What are your current symptoms: ITCHING, DISCOMFORT, DISCHARGE    How long have you been experiencing symptoms: 3 DAYS    Have you had these symptoms before:    [x] Yes  [] No    Have you been treated for these symptoms before:   [x] Yes  [] No    If a prescription is needed, what is your preferred pharmacy and phone number[id number] [address] AT [facility] [address] - [phone]  - [phone] FX

## 2024-03-08 NOTE — TELEPHONE ENCOUNTER
Requesting fluconazole for 3D of vaginal yeast   Rx Refill Note  Requested Prescriptions      No prescriptions requested or ordered in this encounter      Last office visit with prescribing clinician: 12/26/2023   Last telemedicine visit with prescribing clinician: Visit date not found   Next office visit with prescribing clinician: Visit date not found                         Would you like a call back once the refill request has been completed: [] Yes [] No    If the office needs to give you a call back, can they leave a voicemail: [] Yes [] No    Bre Pierre CMA/LMR  03/08/24, 08:30 EST

## 2024-03-08 NOTE — TELEPHONE ENCOUNTER
Medication sent to [facility]. Recommend office eval if symptoms do not improve.

## 2024-05-08 ENCOUNTER — TELEPHONE (OUTPATIENT)
Dept: FAMILY MEDICINE CLINIC | Facility: CLINIC | Age: 29
End: 2024-05-08
Payer: MEDICAID

## 2024-05-08 RX ORDER — FLUCONAZOLE 150 MG/1
150 TABLET ORAL DAILY
Qty: 2 TABLET | Refills: 0 | Status: SHIPPED | OUTPATIENT
Start: 2024-05-08

## 2024-06-13 ENCOUNTER — TELEPHONE (OUTPATIENT)
Dept: NEUROLOGY | Facility: CLINIC | Age: 29
End: 2024-06-13

## 2024-06-13 NOTE — TELEPHONE ENCOUNTER
Caller: Letitia Hernandez    Relationship: Self    Best call back number: [phone]     What form or medical record are you requesting: LETTER    Who is requesting this form or medical record from you: PATIENT    How would you like to receive the form or medical records (pick-up, mail, fax): MY CHART  If fax, what is the fax number: N/A  If mail, what is the address: N/A  If pick-up, provide patient with address and location details    Timeframe paperwork needed: ASAP    Additional notes: PATIENT IS REQUESTING A LETTER FOR MEDICAL NOTICE FOR NEW JOB THAT REQUIRES HER TO BE OUTSIDE OF[employer]EN, WHICH IS A TRIGGER FOR HER DX:ROTHMAN.    PLEASE WRITE & UPLOAD TO PATIENTS MYCHART OR CALL TO ADVISE-THANK YOU

## 2024-06-18 NOTE — TELEPHONE ENCOUNTER
Provider has agreed to draft letter. Lvm for pt regarding what the letter needs to entail. Instructed pt to call or send a [employer] message with the information.     HUB CAN READ

## 2024-06-24 NOTE — TELEPHONE ENCOUNTER
Name: Letitia Hernandez    Relationship: Self    Best Callback Number: [phone]    HUB PROVIDED THE RELAY MESSAGE FROM THE OFFICE   PATIENT VOICED UNDERSTANDING AND HAS NO FURTHER QUESTIONS AT THIS TIME - SHE WILL SEND LETTER REQUEST VIA [employer] AS INSTRUCTED.

## 2024-09-30 ENCOUNTER — TELEPHONE (OUTPATIENT)
Dept: OBSTETRICS AND GYNECOLOGY | Age: 29
End: 2024-09-30

## 2024-09-30 NOTE — TELEPHONE ENCOUNTER
PT is having a lot of pain in left side/lower back. PT can not lift anything, PT thinks it feels just like an ovarian cyst again. PT does not have insurance but is wanting to be seen and can pay for appointment and US with a payment plan if possible to do.

## 2024-10-09 ENCOUNTER — TELEPHONE (OUTPATIENT)
Dept: NEUROLOGY | Facility: CLINIC | Age: 29
End: 2024-10-09

## 2024-10-09 ENCOUNTER — OFFICE VISIT (OUTPATIENT)
Dept: OBSTETRICS AND GYNECOLOGY | Age: 29
End: 2024-10-09

## 2024-10-09 VITALS
SYSTOLIC BLOOD PRESSURE: 108 MMHG | BODY MASS INDEX: 20.83 KG/M2 | DIASTOLIC BLOOD PRESSURE: 64 MMHG | HEIGHT: 65 IN | WEIGHT: 125 LBS

## 2024-10-09 DIAGNOSIS — R10.9 ABDOMINAL CRAMPS: Primary | ICD-10-CM

## 2024-10-09 NOTE — TELEPHONE ENCOUNTER
Attempted to LVM in regard to provider being out of office. Informed on VM, [employer] message, and mail reminder on the next new appointment set for patient. Scheduled for January 28th at 11:00AM

## 2024-10-09 NOTE — PROGRESS NOTES
"Subjective     Chief Complaint   Patient presents with    Follow-up     Gyn F/u - Pt c/o Lt side/back pain, Pt believes she might have a possible ovarian cyst, Pt stating pain comes & goes but feels worse around her period, US today to evaluate         Letitia Hernandez is a 29 y.o.  whose LMP is Patient's last menstrual period was 2024 (exact date). 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   It will sometimes will travel to her back and hip area  This is typically during her ovulation window  The pain will be relieved after this and is tolerable   She think it may be GI related but unsure  She does not have insurance right now this is impacting her access to healthcare  Takes probiotics daily for gi health  Has completed an EGD and was told she needs a c-scope   No vaginal or urinary concerns  Office Visit with Lyla Hawk APRN (2024)         The following portions of the patient's history were reviewed and updated as appropriate:vital signs, allergies, current medications, past medical history, past social history, past surgical history, and problem list      Review of Systems   Pertinent items are noted in HPI.     Objective      /64   Ht 165.1 cm (65\")   Wt 56.7 kg (125 lb)   LMP 2024 (Exact Date)   BMI 20.80 kg/m²     Physical Exam    General:   alert   Heart: Not performed today   Lungs: Not performed today.   Breast: Not performed today   Neck: Not performed today   Abdomen: Not performed today   CVA: Not performed today   Pelvis: Not performed today   Extremities: Extremities normal, atraumatic, no cyanosis or edema   Neurologic: AOx3. Gait normal. Reflexes and motor strength normal and symmetric. Cranial nerves 2-12 and sensation grossly intact.   Psychiatric: Normal affect, judgement, and mood       Lab Review   Labs: No data reviewed    Imaging     1.  Uterus: " Normal size and Anteverted    2.  Endometrium: Normal non-menopausal thickness and 6.52 mm     3.  Myometrium: Normal homogenous texture     4.  Ovaries  Left:    Normal/unremarkable   Right:  Normal/unremarkable   No FF or mass seen    US Non-ob Transvaginal (01/05/2024 10:46)  US Non-ob Transvaginal (01/31/2024 14:07)  CT Abdomen Pelvis Without Contrast (12/28/2023 16:45)         Assessment & Plan     ASSESSMENT  1. Abdominal cramps          PLAN  1. No orders of the defined types were placed in this encounter.      2. Medications prescribed this encounter:      No orders of the defined types were placed in this encounter.      3. Normal tvus today   We discussed birth control options  She declined this  She has no insurance and would like to defer her pap until she is covered  Continue motrin for cramps and pain during cycle and during ovulation period  All questions answered and addressed  I would also recommend seeing GI for evaluation as well  All questions answered and addressed      Follow up: 2 months for AE or with insurance coverage     LEX Garces  10/9/2024

## 2024-10-17 NOTE — TELEPHONE ENCOUNTER
Caller: Letitia Hernandez    Relationship to patient: Self    Best call back number: [phone]    Chief complaint: SHORTNESS OF BREATH AT REST AND WITH EXERTION. CHEST PAIN    Patient directed to call 911 or go to their nearest emergency room.     Patient verbalized understanding: [x] Yes  [] No  If no, why?    Additional notes;  
I saw she was in the ED when I logged in this morning. She had chest imaging and labs, things overall looking good.   
I was looking through messages at the end of the day and saw this. In looking in her chart I did not see that she went to the hospital anywhere in the system and so gave her a call. I did not get to talk to her but left her a message. I told her that I couldn't tell that she was evaluated and her symptoms are very concerning especially after covid infection she could develop a blood clot and a blood clot in the lungs could present as easily SOB and chest pain. I told her that if her symptoms persist, do not improve, or worsened she should be evaluated at the hospital urgently and not the urgent care. I nor the [facility] can give her the care quickly enough to see what may be causing her symptoms.   
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 Normal day to day, just in moments when working with child she feels that way. Pt states recently had covid 12/24/21. Denies any other symptoms and other covid symptoms have gone away.   
Unable to reach pt. LVM asking for status and to call the office back.   
1

## 2025-01-17 ENCOUNTER — TELEPHONE (OUTPATIENT)
Dept: NEUROLOGY | Facility: CLINIC | Age: 30
End: 2025-01-17

## 2025-01-17 NOTE — TELEPHONE ENCOUNTER
Caller: Letitia Hernandez    Relationship: Self    Best call back number: [phone]     What orders are you requesting (i.e. 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

## 2025-01-28 ENCOUNTER — OFFICE VISIT (OUTPATIENT)
Dept: NEUROLOGY | Facility: CLINIC | Age: 30
End: 2025-01-28
Payer: COMMERCIAL

## 2025-01-28 ENCOUNTER — TELEPHONE (OUTPATIENT)
Dept: NEUROLOGY | Facility: CLINIC | Age: 30
End: 2025-01-28

## 2025-01-28 VITALS
HEIGHT: 65 IN | BODY MASS INDEX: 22.49 KG/M2 | SYSTOLIC BLOOD PRESSURE: 110 MMHG | OXYGEN SATURATION: 96 % | HEART RATE: 91 BPM | WEIGHT: 135 LBS | DIASTOLIC BLOOD PRESSURE: 60 MMHG

## 2025-01-28 DIAGNOSIS — E23.7 PITUITARY LESION: Primary | ICD-10-CM

## 2025-01-28 DIAGNOSIS — R55 VASOVAGAL SYNDROME: ICD-10-CM

## 2025-01-28 DIAGNOSIS — E23.7 PITUITARY LESION: ICD-10-CM

## 2025-01-28 PROCEDURE — 99214 OFFICE O/P EST MOD 30 MIN: CPT | Performed by: PSYCHIATRY & NEUROLOGY

## 2025-01-28 NOTE — TELEPHONE ENCOUNTER
LVM in regards to calling us back in order to verify insurance, as no insurance is attached in our system for visit.

## 2025-01-28 NOTE — PROGRESS NOTES
Chief Complaint   Patient presents with    ROTHMAN       Patient ID: Letitia Hernandez is a 29 y.o. female.    HPI:  I had the pleasure of seeing your patient again today.  As you may know she is a 29-year-old female whom I have seen previously for history of vasovagal syndrome.  She has been doing well with that.  She is able to mitigate her symptoms if they do arise.  She does not lose consciousness.  She had an MRI of her brain that was ordered by her primary care physician back in 2023.  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  In looking back in her chart she did have a prolactin level around that time that was within normal limits.  LH and FSH were also within normal limits.  She is here for follow-up on that.  No visual changes.  No focal weakness or numbness of her arms or legs.  No lactation.    The following portions of the patient's history were reviewed and updated as appropriate: allergies, current medications, past family history, past medical history, past social history, past surgical history and problem list.    Review of Systems   Constitutional:  Positive for fatigue.   Neurological:  Positive for light-headedness and headaches. Negative for dizziness, tremors, seizures, syncope, facial asymmetry, speech difficulty, weakness and numbness.   Psychiatric/Behavioral:  Positive for sleep disturbance. Negative for agitation, behavioral problems, confusion, decreased concentration, dysphoric mood, hallucinations, self-injury and suicidal ideas. The patient is not nervous/anxious and is not hyperactive.       I have reviewed the review of systems above performed by my medical assistant.      Vitals:    01/28/25 1106   BP: 110/60   Pulse: 91   SpO2: 96%       Neurological Exam  Mental Status  Awake, alert and oriented to person, place and time. Recent and remote memory are intact. Language is fluent with no aphasia. Attention and concentration are normal. Fund of knowledge is appropriate for level of  education.    Cranial Nerves  CN I: Sense of smell is normal.  CN II: Visual acuity is normal.  CN III, IV, VI: Pupils equal round and reactive to light bilaterally.  CN V: Facial sensation is normal.  CN VII: Full and symmetric facial movement.  CN XI: Shoulder shrug strength is normal.  CN XII: Tongue midline without atrophy or fasciculations.    Motor  Normal muscle bulk throughout. No fasciculations present. Normal muscle tone. No abnormal involuntary movements. No pronator drift.                                             Right                     Left  Rhomboids                            5                          5  Infraspinatus                          5                          5  Supraspinatus                       5                          5  Deltoid                                   5                          5   Biceps                                   5                          5  Brachioradialis                      5                          5   Triceps                                  5                          5   Pronator                                5                          5   Supinator                              5                           5   Wrist flexor                            5                          5   Wrist extensor                       5                          5   Finger flexor                          5                          5   Finger extensor                     5                          5   Interossei                              5                          5   Abductor pollicis brevis         5                          5   Flexor pollicis brevis             5                          5   Opponens pollicis                 5                          5  Extensor digitorum               5                          5  Abductor digiti minimi           5                          5   Abdominal                            5                          5  Glutei                                     5                          5  Hip abductor                         5                          5  Hip adductor                         5                          5   Iliopsoas                               5                          5   Quadriceps                           5                          5   Hamstring                             5                          5   Gastrocnemius                     5                           5   Anterior tibialis                      5                          5   Posterior tibialis                    5                          5   Peroneal                               5                          5  Ankle dorsiflexor                   5                          5  Ankle plantar flexor              5                           5  Extensor hallucis longus      5                           5    Sensory  Sensation is intact to light touch, pinprick, vibration and proprioception in all four extremities.    Reflexes    Right pathological reflexes: Jesika's absent.  Left pathological reflexes: Jesika's absent.    Gait  Normal casual, toe, heel and tandem gait.       Physical Exam  Vitals reviewed.   Constitutional:       Appearance: She is well-developed.   HENT:      Head: Normocephalic and atraumatic.   Eyes:      Pupils: Pupils are equal, round, and reactive to light.   Cardiovascular:      Rate and Rhythm: Normal rate and regular rhythm.   Pulmonary:      Breath sounds: Normal breath sounds.   Musculoskeletal:         General: Normal range of motion.   Skin:     General: Skin is warm.         Procedures    Assessment/Plan: Our plan will be to schedule her for a pituitary MRI.  We will check prolactin, LH and FSH levels today.  No need for visual field testing at this time.  Will see her back in 2 months to discuss results.  A total of 30 minutes was spent face-to-face with the patient today.  Of that greater than 50% of this time was spent discussing signs  and symptoms of vasovagal syndrome, pituitary Rathke cleft cyst, patient education, plan of care and prognosis.         Diagnoses and all orders for this visit:    1. Pituitary lesion (Primary)  -     MRI pituitary w wo contrast; Future  -     Prolactin; Future  -     FSH & LH; Future    2. Vasovagal syndrome           López Mccloud II, MD

## 2025-01-29 LAB
FSH SERPL-ACNC: 7.4 MIU/ML
LH SERPL-ACNC: 11.8 MIU/ML
PROLACTIN SERPL-MCNC: 13.5 NG/ML (ref 4.8–33.4)

## 2025-02-11 ENCOUNTER — TELEPHONE (OUTPATIENT)
Dept: OBSTETRICS AND GYNECOLOGY | Age: 30
End: 2025-02-11
Payer: COMMERCIAL

## 2025-02-11 NOTE — TELEPHONE ENCOUNTER
Caller: Letitia Hernandez  Female, 29 y.o., 1995  MRN: [id number]  CSN: [id number]  Phone: [phone]    Relationship to patient: SELF        Additional notes:PT CALLED BACK TO INFORM THAT SHE CANNOT COME EARLIER FOR HER APPT ON 2-12-25 DUE TO BEING AT WORK

## 2025-02-12 ENCOUNTER — OFFICE VISIT (OUTPATIENT)
Dept: OBSTETRICS AND GYNECOLOGY | Age: 30
End: 2025-02-12
Payer: COMMERCIAL

## 2025-02-12 VITALS
BODY MASS INDEX: 21.99 KG/M2 | SYSTOLIC BLOOD PRESSURE: 122 MMHG | DIASTOLIC BLOOD PRESSURE: 60 MMHG | WEIGHT: 132 LBS | HEIGHT: 65 IN

## 2025-02-12 DIAGNOSIS — Z01.419 ENCOUNTER FOR ANNUAL ROUTINE GYNECOLOGICAL EXAMINATION: Primary | ICD-10-CM

## 2025-02-12 DIAGNOSIS — Z12.4 SCREENING FOR CERVICAL CANCER: ICD-10-CM

## 2025-02-12 NOTE — PROGRESS NOTES
[facility]   Obstetrics and Gynecology     2025    Patient: Letitia Hernandez          MR#:[id number]    History of Present Illness    Chief Complaint   Patient presents with    Gynecologic Exam     CC: annual. Last pap stated 2-3 years ago.        29 y.o. female  who presents for annual exam.  Patient is concerned about possibility of endometriosis as she has very severe periods as well as significant pain and cramping in between her periods.  She feels like this is worse after a recent ovarian cyst on the left.  She is pursuing other possibilities for explanations of pelvic pain including getting a colonoscopy which will be scheduled shortly.    Relevant data reviewed:    Patient's last menstrual period was 2025 (exact date).  Obstetric History:  OB History          0    Para   0    Term   0       0    AB   0    Living   0         SAB   0    IAB   0    Ectopic   0    Molar   0    Multiple   0    Live Births   0               Menstrual History:     Patient's last menstrual period was 2025 (exact date).       Social History     Substance and Sexual Activity   Sexual Activity Yes    Partners: Male    Birth control/protection: Condom     ______________________________________  Patient Active Problem List   Diagnosis    Body mass index (BMI) of 19 or less in adult    Vitamin D deficiency    GERD (gastroesophageal reflux disease)    Allergic rhinitis    Bloating    Epigastric pain    Bilious vomiting with nausea    Vasovagal attack    Panic attack as reaction to stress    Abdominal pain    Change in bowel habits     Past Medical History:   Diagnosis Date    Dizziness     Environmental allergies     Gastric polyp     GERD (gastroesophageal reflux disease)     Pituitary cyst     Syncope      Past Surgical History:   Procedure Laterality Date    ENDOSCOPY  02/10/2015    gastric polyp    ENDOSCOPY N/A 2023    Procedure: ESOPHAGOGASTRODUODENOSCOPY WITH BX;  Surgeon:  Jitendra Parikh MD;  [place] [facility] [place]  Service: Gastroenterology;  Laterality: N/A;  Normal     Social History     Tobacco Use   Smoking Status Never    Passive exposure: Never   Smokeless Tobacco Never   Tobacco Comments    tried smoking in high school, caffeine use maybe twice weekly     Family History   Problem Relation Age of Onset    Hypertension Mother     Hypertension Father     Seizures Brother     Cancer Maternal Aunt     Cancer Maternal Grandmother     Colon polyps Neg Hx     Crohn's disease Neg Hx     Colon cancer Neg Hx     Irritable bowel syndrome Neg Hx     Ulcerative colitis Neg Hx      Prior to Admission medications    Medication Sig Start Date End Date Taking? Authorizing Provider   albuterol sulfate  (90 Base) MCG/ACT inhaler Inhale 2 puffs 3 (Three) Times a Day. 3/20/23  Yes Tulio Mosley MD   cetirizine (zyrTEC) 10 MG tablet Take 1 tablet by mouth Daily.   Yes ProviderDominick MD   Cholecalciferol (Vitamin D3) 1.25 MG (92628 UT) capsule Take 1 capsule by mouth 1 (One) Time Per Week. 6/19/23  Yes Jocelin Gordon MD   fluticasone (FLONASE) 50 MCG/ACT nasal spray Shake liquid and use 2 sprays in each nostril daily. 1/21/22  Yes    Multiple Vitamins-Minerals (MULTIVITAMIN WITH MINERALS) tablet Take  by mouth.   Yes ProviderDominick MD   omeprazole (priLOSEC) 40 MG capsule Take 1 capsule by mouth Daily. 2/21/23  Yes Karyna Mcneal MD   ondansetron ODT (ZOFRAN-ODT) 4 MG disintegrating tablet Place 1 tablet on the tongue Every 8 (Eight) Hours As Needed for Nausea or Vomiting. 11/21/23  Yes Jocelin Gordon MD   dicyclomine (BENTYL) 10 MG capsule Take 1 capsule by mouth 3 (Three) Times a Day As Needed (abdominal pain/diarrhea).  Patient not taking: Reported on 10/9/2024 2/8/24   Lyla Hawk APRN   famotidine (PEPCID) 20 MG tablet Take 1 tablet by mouth Every Night.  Patient not taking: Reported on 2/12/2025 2/8/24   Lyla Hawk APROTTO   fluconazole (DIFLUCAN)  150 MG tablet Take 1 tablet by mouth Daily. Take second tab 3 days later if symptoms persist  Patient not taking: Reported on 10/9/2024 5/8/24   Jocelin Gordon MD   ibuprofen (ADVIL,MOTRIN) 800 MG tablet Take 1 tablet by mouth Every 8 (Eight) Hours As Needed for Moderate Pain.  Patient not taking: Reported on 2/12/2025 11/21/23   Jocelin Gordon MD   meloxicam (MOBIC) 15 MG tablet Take 1 tablet by mouth Daily.  Patient not taking: Reported on 2/12/2025 1/30/24   Jamee Poe APRN   polyethylene glycol (MIRALAX) 17 g packet Take 17 g by mouth Daily.  Patient not taking: Reported on 2/12/2025 12/26/23   Jocelin Gordon MD   prochlorperazine (COMPAZINE) 5 MG tablet Take 1 tablet by mouth Every 6 (Six) Hours As Needed for Nausea or Vomiting.  Patient not taking: Reported on 10/9/2024 10/18/23   Jocelin Gordon MD     _______________________________________    Current contraception: condoms  History of abnormal Pap smear: no  Family history of uterine or ovarian cancer: yes - maternal grandmother   Family History of colon cancer/colon polyps: no  History of abnormal mammogram:  not yet indicated   History of abnormal lipids: no    The following portions of the patient's history were reviewed and updated as appropriate: allergies, current medications, past family history, past medical history, past social history, past surgical history, and problem list.        Pertinent items are noted in HPI.       Objective   Physical Exam  Vitals and nursing note reviewed. Exam conducted with a chaperone present.   Constitutional:       Appearance: Normal appearance.   HENT:      Head: Normocephalic and atraumatic.   Pulmonary:      Effort: Pulmonary effort is normal.   Chest:   Breasts:     Right: Normal.      Left: Normal.   Abdominal:      General: Abdomen is flat.      Palpations: Abdomen is soft.   Genitourinary:     Exam position: Lithotomy position.      Labia:         Right: No rash or lesion.         Left: No  "rash or lesion.       Vagina: Normal. No vaginal discharge or lesions.      Cervix: Normal. No lesion.      Uterus: Normal. Not tender.       Adnexa: Right adnexa normal and left adnexa normal.        Right: No mass or tenderness.          Left: No mass or tenderness.     Lymphadenopathy:      Upper Body:      Right upper body: No supraclavicular, axillary or pectoral adenopathy.      Left upper body: No supraclavicular, axillary or pectoral adenopathy.   Skin:     General: Skin is warm and dry.   Neurological:      Mental Status: She is alert and oriented to person, place, and time.   Psychiatric:         Mood and Affect: Mood normal.         /60   Ht 165 cm (64.96\")   Wt 59.9 kg (132 lb)   LMP 2025 (Exact Date)   BMI 21.99 kg/m²    BP Readings from Last 3 Encounters:   25 122/60   25 110/60   10/09/24 108/64      Wt Readings from Last 3 Encounters:   25 59.9 kg (132 lb)   25 61.2 kg (135 lb)   10/09/24 56.7 kg (125 lb)        BMI: Estimated body mass index is 21.99 kg/m² as calculated from the following:    Height as of this encounter: 165 cm (64.96\").    Weight as of this encounter: 59.9 kg (132 lb).    As part of wellness and prevention, the following topics were discussed with the patient:  Encouraged self breast exam  Sexual transmitted disease prevention   Contraception discussed.   Dental health discussed  Vaccinations/immunizations addressed.           Problem List   Meds  History  Prep for Surg   Imagin}    Assessment:  29 y.o. female  who presents for annual exam.  Diagnoses and all orders for this visit:    1. Encounter for annual routine gynecological examination (Primary)    2. Screening for cervical cancer  -     Cancel: IGP,rfx Aptima HPV All Pth  -     IGP,CtNgTv,rfx Apt HPV All    - Pap collected today  - Mammo/Colonoscopy not yet indicated  - Vaccine recs reviewed  - STI screening collected today  -We discussed endometriosis in detail and that " the only way to truly diagnose it is with diagnostic laparoscopy.  We discussed that first-line treatment is ibuprofen and OCPs.  Patient is not currently interested in hormone therapy for pelvic pain.  She states that she will see what the colonoscopy shows and after that may be interested in a diagnostic laparoscopy which I am happy to schedule for her.    Plan:  Return in about 1 year (around 2/12/2026).    Noni More MD  2/12/2025 15:17 EST

## 2025-02-19 LAB
C TRACH RRNA CVX QL NAA+PROBE: NEGATIVE
CONV .: ABNORMAL
CYTOLOGIST CVX/VAG CYTO: ABNORMAL
CYTOLOGY CVX/VAG DOC CYTO: ABNORMAL
CYTOLOGY CVX/VAG DOC THIN PREP: ABNORMAL
DX ICD CODE: ABNORMAL
DX ICD CODE: ABNORMAL
HPV I/H RISK 4 DNA CVX QL PROBE+SIG AMP: POSITIVE
N GONORRHOEA RRNA CVX QL NAA+PROBE: NEGATIVE
OTHER STN SPEC: ABNORMAL
PATHOLOGIST CVX/VAG CYTO: ABNORMAL
RECOM F/U CVX/VAG CYTO: ABNORMAL
SERVICE CMNT-IMP: ABNORMAL
STAT OF ADQ CVX/VAG CYTO-IMP: ABNORMAL
T VAGINALIS RRNA SPEC QL NAA+PROBE: NEGATIVE

## 2025-02-21 ENCOUNTER — OFFICE VISIT (OUTPATIENT)
Dept: FAMILY MEDICINE CLINIC | Facility: CLINIC | Age: 30
End: 2025-02-21
Payer: COMMERCIAL

## 2025-02-21 VITALS
OXYGEN SATURATION: 93 % | WEIGHT: 132 LBS | HEART RATE: 68 BPM | HEIGHT: 65 IN | DIASTOLIC BLOOD PRESSURE: 70 MMHG | SYSTOLIC BLOOD PRESSURE: 110 MMHG | BODY MASS INDEX: 21.99 KG/M2 | TEMPERATURE: 96.9 F

## 2025-02-21 DIAGNOSIS — M54.31 RIGHT SIDED SCIATICA: Primary | ICD-10-CM

## 2025-02-21 PROCEDURE — 99213 OFFICE O/P EST LOW 20 MIN: CPT | Performed by: STUDENT IN AN ORGANIZED HEALTH CARE EDUCATION/TRAINING PROGRAM

## 2025-02-21 RX ORDER — MELOXICAM 15 MG/1
15 TABLET ORAL DAILY
Qty: 30 TABLET | Refills: 0 | Status: SHIPPED | OUTPATIENT
Start: 2025-02-21

## 2025-02-21 RX ORDER — METHOCARBAMOL 500 MG/1
500 TABLET, FILM COATED ORAL 3 TIMES DAILY PRN
Qty: 30 TABLET | Refills: 1 | Status: SHIPPED | OUTPATIENT
Start: 2025-02-21

## 2025-02-21 NOTE — PROGRESS NOTES
"Chief Complaint  Back Pain (Pt has complaints of lower back pain.)    Subjective        Letitia Hernandez presents to [facility]  History of Present Illness  29-year-old female who presents for 2 weeks of worsening right-sided back pain.    She states that she initially started noticing symptoms in November 2024.  This included some stiffness in her back especially with lifting and bending over.  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    She awoke a few weeks ago with more severe pain in the right back.  It occasionally will radiate down her right leg.  No numbness or other neurologic deficits.  She has been taking Tylenol and ibuprofen which has been mildly helpful.  She has tried some stretching exercises which has helped somewhat.      Objective   Vital Signs:  /70   Pulse 68   Temp 96.9 °F (36.1 °C)   Ht 165.1 cm (65\")   Wt 59.9 kg (132 lb)   SpO2 93%   BMI 21.97 kg/m²   Estimated body mass index is 21.97 kg/m² as calculated from the following:    Height as of this encounter: 165.1 cm (65\").    Weight as of this encounter: 59.9 kg (132 lb).    BMI is within normal parameters. No other follow-up for BMI required.      Physical Exam  Constitutional:       General: She is not in acute distress.  Eyes:      Conjunctiva/sclera: Conjunctivae normal.   Cardiovascular:      Rate and Rhythm: Normal rate and regular rhythm.   Pulmonary:      Effort: Pulmonary effort is normal. No respiratory distress.      Breath sounds: Normal breath sounds.   Musculoskeletal:      Comments: No vertebral tenderness.  Straight leg positive right side   Skin:     General: Skin is warm and dry.   Neurological:      Mental Status: She is alert and oriented to person, place, and time.      Sensory: No sensory deficit.      Motor: No weakness.   Psychiatric:         Mood and Affect: Mood normal.         Behavior: Behavior normal.      "   Result Review :  The following data was reviewed by: Jocelin Gordon MD on 02/21/2025:    Data reviewed : None           Assessment and Plan   Diagnoses and all orders for this visit:    1. Right sided sciatica (Primary)  -     methocarbamol (ROBAXIN) 500 MG tablet; Take 1 tablet by mouth 3 (Three) Times a Day As Needed for Muscle Spasms.  Dispense: 30 tablet; Refill: 1  -     Ambulatory Referral to Physical Therapy for Evaluation & Treatment  -     meloxicam (MOBIC) 15 MG tablet; Take 1 tablet by mouth Daily.  Dispense: 30 tablet; Refill: 0    New, worsening problem.  No neurologic deficits or red flag symptoms.  Will treat with anti-inflammatories and muscle relaxants.  I also think due to chronicity of her pain she would benefit from physical therapy which I have referred her to today.    I would recommend 2-week course of anti-inflammatories and muscle relaxants.  I would also recommend 2 weeks of no lifting heavier than 5 pounds or strenuous exercise.         Follow Up   No follow-ups on file.  Patient was given instructions and counseling regarding her condition or for health maintenance advice. Please see specific information pulled into the AVS if appropriate.

## 2025-02-21 NOTE — LETTER
February 21, 2025    Letitia Hernandez  [address]  [place] [id number]            Letitia Hernandez was seen in my office on 2/21/25. It is in my medical opinion that she can return to work but must be restricted to no lifting above 5lbs for the next 2 weeks.    If you have any questions or concerns, please do not hesitate to call.         Sincerely,            Jocelin Gordon MD

## 2025-02-24 ENCOUNTER — TELEPHONE (OUTPATIENT)
Dept: FAMILY MEDICINE CLINIC | Facility: CLINIC | Age: 30
End: 2025-02-24
Payer: COMMERCIAL

## 2025-02-24 DIAGNOSIS — F40.240 CLAUSTROPHOBIA: Primary | ICD-10-CM

## 2025-02-24 RX ORDER — ALPRAZOLAM 0.25 MG
0.25 TABLET ORAL ONCE AS NEEDED
Qty: 1 TABLET | Refills: 0 | Status: SHIPPED | OUTPATIENT
Start: 2025-02-24

## 2025-02-24 NOTE — TELEPHONE ENCOUNTER
I will send anxiety medication to her pharmacy.  However she needs to have someone drive her to and from the MRI if she takes the medication.

## 2025-02-24 NOTE — TELEPHONE ENCOUNTER
Caller: Letitia Hernandez    Relationship: Self    Best call back number: [phone]      What was the call regarding: PATIENT CALLING STATES THE MEDICATION THAT WAS GIVEN TO HER FOR BACK PAIN ISN'T WORKING AS WELL AS SHE THOUGHT      PATIENT STATES SEEMS LIKE THE MUSCLE WILL RELAX BUT SHE IS STILL HAVING PAIN     PATIENT STATES THE MEDICATION GIVEN DOES MAKE HER FEEL SLEEPY     PATIENT WANTED TO SEE WHAT DR GUPTA RECOMMEND NEXT STEP BE     PLEASE GIVE CALLBACK  IF NO ANSWER PLEASE LEAVE A DETAILED MESSAGE DUE TO PATIENT IS

## 2025-02-25 ENCOUNTER — HOSPITAL ENCOUNTER (OUTPATIENT)
Dept: MRI IMAGING | Facility: HOSPITAL | Age: 30
Discharge: HOME | End: 2025-02-25
Admitting: PSYCHIATRY & NEUROLOGY
Payer: COMMERCIAL

## 2025-02-25 DIAGNOSIS — E23.7 PITUITARY LESION: ICD-10-CM

## 2025-02-25 PROCEDURE — 25510000002 GADOBENATE DIMEGLUMINE 529 MG/ML SOLUTION: Performed by: PSYCHIATRY & NEUROLOGY

## 2025-02-25 PROCEDURE — 70553 MRI BRAIN STEM W/O & W/DYE: CPT

## 2025-02-25 PROCEDURE — A9577 INJ MULTIHANCE: HCPCS | Performed by: PSYCHIATRY & NEUROLOGY

## 2025-02-25 RX ADMIN — GADOBENATE DIMEGLUMINE 12 ML: 529 INJECTION, SOLUTION INTRAVENOUS at 15:33

## 2025-03-06 ENCOUNTER — TELEPHONE (OUTPATIENT)
Dept: FAMILY MEDICINE CLINIC | Facility: CLINIC | Age: 30
End: 2025-03-06
Payer: COMMERCIAL

## 2025-03-06 NOTE — TELEPHONE ENCOUNTER
Caller: Letitia Hernandez    Relationship: Self    Best call back number: [phone]      What medication are you requesting: SOMETHING FOR YEAST INFECTION    What are your current symptoms: IRRITATION, ITCHY, DISCHARGE, YEAST INFECTION    How long have you been experiencing symptoms: FEW DAYS    Have you had these symptoms before:    [x] Yes  [] No    Have you been treated for these symptoms before:   [x] Yes  [] No    If a prescription is needed, what is your preferred pharmacy and phone number: [id number] [address] AT [facility] [address] - [phone]  - [phone] FX

## 2025-03-07 RX ORDER — FLUCONAZOLE 150 MG/1
150 TABLET ORAL DAILY
Qty: 2 TABLET | Refills: 0 | Status: SHIPPED | OUTPATIENT
Start: 2025-03-07

## 2025-03-11 ENCOUNTER — TELEPHONE (OUTPATIENT)
Dept: FAMILY MEDICINE CLINIC | Facility: CLINIC | Age: 30
End: 2025-03-11

## 2025-03-12 RX ORDER — CELECOXIB 100 MG/1
100 CAPSULE ORAL 2 TIMES DAILY PRN
Qty: 60 CAPSULE | Refills: 0 | Status: SHIPPED | OUTPATIENT
Start: 2025-03-12

## 2025-03-20 ENCOUNTER — OFFICE VISIT (OUTPATIENT)
Dept: NEUROLOGY | Facility: CLINIC | Age: 30
End: 2025-03-20
Payer: COMMERCIAL

## 2025-03-20 VITALS
SYSTOLIC BLOOD PRESSURE: 98 MMHG | HEART RATE: 84 BPM | WEIGHT: 132 LBS | HEIGHT: 65 IN | BODY MASS INDEX: 21.99 KG/M2 | DIASTOLIC BLOOD PRESSURE: 64 MMHG | OXYGEN SATURATION: 98 %

## 2025-03-20 DIAGNOSIS — E23.7 PITUITARY LESION: ICD-10-CM

## 2025-03-20 DIAGNOSIS — G43.109 MIGRAINE WITH AURA AND WITHOUT STATUS MIGRAINOSUS, NOT INTRACTABLE: Primary | ICD-10-CM

## 2025-03-20 DIAGNOSIS — R11.0 NAUSEA: ICD-10-CM

## 2025-03-20 RX ORDER — ONDANSETRON 4 MG/1
4 TABLET, ORALLY DISINTEGRATING ORAL EVERY 8 HOURS PRN
Qty: 20 TABLET | Refills: 2 | Status: SHIPPED | OUTPATIENT
Start: 2025-03-20

## 2025-03-20 NOTE — PROGRESS NOTES
DOS: 3/20/2025  NAME: Letitia Hernandez   : 1995  PCP: Jocelin Gordon MD    Chief Complaint   Patient presents with    Pituitary lesion      SUBJECTIVE  Neurological Problem:  29 y.o. female with a past medical history of vit D deficiency, GERD, vasovagal attacks, ?POTS, migraine headaches. They are seen in follow up today for pituitary lesion, however the problem is new to the examiner. Patient last seen by Dr. López Mccloud in 2025, with a summary of the history taken from the previous note with additions/modifications as indicated. She is unaccompanied.    Interval History:   Miss Hernandez first presented to our clinic in 2023, seen by Dr. Mccloud for vasovagal syndrome.  Per review of chart she denied any focal weakness or numbness of her extremities, no loss of awareness or loss of function, no bowel or bladder involvement, no automatisms.  She had a negative tilt table test.  CTA of the head/neck completed in May 2023 showed no stenosis or occlusion, no LVO.  She was lost to follow-up until 2025, asked to see us for abnormal MRI brain results.  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  At that time, normal prolactin of 10.7, normal FSH of 5.6 and normal LH of 24.7.  At her office visit in January she denied any lactation, denied visual changes or any focal symptoms.  Dr. Mccloud recommended a repeat pituitary MRI along with rechecking prolactin, LH and FSH levels.    She presents today in follow-up.  MRI pituitary with and without contrast completed 2025.  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  Repeat prolactin normal 13.5, LH normal 11.8, FSH normal 7.4.  She continues to deny  any abnormal lactation, no visual changes.  She tells me she was recently seen by endocrinology in the past.    She does mention headaches.  She says that she has experienced migraine headaches since she was 13 or 14 years old.  She continues to experience them several times a month, less than 5.  She says when they occur they are very debilitating.  She is sensitive to light and sound and experiences significant nausea.  She describes the head pain as occurring on both sides of her head that radiate backwards.  She describes it as a pulsing, swollen, pressure sensation.  She does experience a visual aura where she will have a bright spot in the vision of 1 eye and when closing her eyes she will see a halo around the spot.  She cannot identify any triggers.  She treats it with over-the-counter medications like Excedrin.  Caffeine can worsen it but also help it.  She denies any focal symptoms, no visual or speech deficit, no facial droop or difficulty swallowing, no unilateral extremity weakness or numbness.    Review of Systems   Musculoskeletal:  Positive for back pain.   Neurological:  Negative for dizziness, tremors, seizures, syncope, facial asymmetry, speech difficulty, weakness, light-headedness, numbness and headaches.   Psychiatric/Behavioral:  Negative for agitation, behavioral problems, confusion, decreased concentration, dysphoric mood, hallucinations, self-injury, sleep disturbance and suicidal ideas. The patient is not nervous/anxious and is not hyperactive.         The following portions of the patient's history were reviewed and updated as appropriate: allergies, current medications, past family history, past medical history, past social history, past surgical history and problem list.    Current Medications:   Current Outpatient Medications:     albuterol sulfate  (90 Base) MCG/ACT inhaler, Inhale 2 puffs 3 (Three) Times a Day., Disp: 6.7 g, Rfl: 0    celecoxib (CeleBREX) 100 MG capsule, Take  "1 capsule by mouth 2 (Two) Times a Day As Needed for Mild Pain or Moderate Pain., Disp: 60 capsule, Rfl: 0    cetirizine (zyrTEC) 10 MG tablet, Take 1 tablet by mouth Daily., Disp: , Rfl:     Cholecalciferol (Vitamin D3) 1.25 MG (42963 UT) capsule, Take 1 capsule by mouth 1 (One) Time Per Week., Disp: 12 capsule, Rfl: 0    famotidine (PEPCID) 20 MG tablet, Take 1 tablet by mouth Every Night., Disp: 30 tablet, Rfl: 2    fluticasone (FLONASE) 50 MCG/ACT nasal spray, Shake liquid and use 2 sprays in each nostril daily., Disp: 16 g, Rfl: 10    meloxicam (MOBIC) 15 MG tablet, Take 1 tablet by mouth Daily., Disp: 30 tablet, Rfl: 0    methocarbamol (ROBAXIN) 500 MG tablet, Take 1 tablet by mouth 3 (Three) Times a Day As Needed for Muscle Spasms., Disp: 30 tablet, Rfl: 1    Multiple Vitamins-Minerals (MULTIVITAMIN WITH MINERALS) tablet, Take  by mouth., Disp: , Rfl:     omeprazole (priLOSEC) 40 MG capsule, Take 1 capsule by mouth Daily., Disp: 90 capsule, Rfl: 3    ondansetron ODT (ZOFRAN-ODT) 4 MG disintegrating tablet, Place 1 tablet on the tongue Every 8 (Eight) Hours As Needed for Nausea or Vomiting., Disp: 20 tablet, Rfl: 2    ALPRAZolam (Xanax) 0.25 MG tablet, Take 1 tablet by mouth 1 (One) Time As Needed for Anxiety (claustrophobia) for up to 1 dose., Disp: 1 tablet, Rfl: 0    ubrogepant (Ubrelvy) 100 MG tablet, Take one tab for moderate to severe headache, may repeat once after 2 hours if needed, max 200 mg in 24 hours, Disp: 4 tablet, Rfl: 0    ubrogepant (Ubrelvy) 100 MG tablet, Take 1 tablet by mouth 1 (One) Time As Needed (for migraine headache). May repeat dose in two hours if needed., Disp: 10 tablet, Rfl: 3  **I did not stop or change the above medications.  Patient's medication list was updated to reflect medications they have reported as currently taking, including medication changes made by other providers.    Objective   Vital Signs:  BP 98/64   Pulse 84   Ht 165.1 cm (65\")   Wt 59.9 kg (132 lb)   " "SpO2 98%   BMI 21.97 kg/m²   Body mass index is 21.97 kg/m².    Physical Exam   Physical Exam:  GENERAL: NAD, alert  HEENT: Normocephalic, atraumatic   Resp: Even and unlabored    Neurological:   MS: AOx3, recent/remote memory intact, normal attention/concentration, language intact, no neglect  CN: visual acuity grossly normal, PERRL, EOMI, no nystagmus, no facial droop, no dysarthria, tongue midline, palate elevates symmetrically, bilateral shoulder shrug symmetric  Motor: Normal tone and bulk. No tremor or abnormal movements noted. No asterixis.  Deltoid: 5/5 right; 5/5 left  Biceps: 5/5 right; 5/5 left  Triceps: 5/5 right; 5/5 left  Left  2+  Right  2+  Hip abduction: 5/5 right; 5/5 left  Hip adduction: 5/5 right; 5/5 left  Iliopsoas: 5/5 right; 5/5 left  Quadriceps: 5/5 right; 5/5 left  Hamstrin/5 right; 5/5 left  Tibialis interior: 5/5 right; 5/5 left  Toe extensors: 5/5 LLE, 5/5 RLE  Toe flexors: 5/5 LLE, 5/5 RLE  Sensory: Intact to crude touch in all four ext.  Coordination: No dysmetria finger to nose   Gait and station: Normal gait and station    Result Review :  The following data was reviewed by: LEX Warren on 2025:  Laboratory Results:         Lab Results   Component Value Date    WBC 5.08 2023    HGB 13.1 2023    HCT 40.8 2023    MCV 90.5 2023     2023     Lab Results   Component Value Date    GLUCOSE 102 (H) 2023    BUN 13 2023    CREATININE 0.71 2023    EGFRIFNONA 122 2022    EGFRIFAFRI 127 2021    BCR 18.3 2023    K 3.8 2023    CO2 25.9 2023    CALCIUM 9.7 2023    ALBUMIN 4.9 2023    AST 17 2023    ALT 10 2023     No results found for: \"HGBA1C\"  No results found for: \"CHOL\"  No results found for: \"HDL\"  No results found for: \"LDL\"  No results found for: \"TRIG\"  Lab Results   Component Value Date    RPR Non Reactive 2023     Lab Results   Component Value Date " "   TSH 1.310 02/21/2023     No results found for: \"VVRDTCKB61\"    Data reviewed : Radiologic studies             Assessment and Plan   Diagnoses and all orders for this visit:    1. Migraine with aura and without status migrainosus, not intractable (Primary)  -     ubrogepant (Ubrelvy) 100 MG tablet; Take one tab for moderate to severe headache, may repeat once after 2 hours if needed, max 200 mg in 24 hours  Dispense: 4 tablet; Refill: 0  -     ondansetron ODT (ZOFRAN-ODT) 4 MG disintegrating tablet; Place 1 tablet on the tongue Every 8 (Eight) Hours As Needed for Nausea or Vomiting.  Dispense: 20 tablet; Refill: 2  -     ubrogepant (Ubrelvy) 100 MG tablet; Take 1 tablet by mouth 1 (One) Time As Needed (for migraine headache). May repeat dose in two hours if needed.  Dispense: 10 tablet; Refill: 3    2. Pituitary lesion  -     Cancel: MRI Brain With & Without Contrast; Future  -     MRI Pituitary With & Without Contrast; Future    3. Nausea  -     ondansetron ODT (ZOFRAN-ODT) 4 MG disintegrating tablet; Place 1 tablet on the tongue Every 8 (Eight) Hours As Needed for Nausea or Vomiting.  Dispense: 20 tablet; Refill: 2      Pituitary lesion likely not an adenoma however we will repeat scan in 6 to 12 months to monitor stability.  Exam and lab work reassuring.  For her migraine headaches, we will try a rescue medication.  Given her history of POTS/vasovagal syndrome, I would like to avoid triptans.  We will initiate Ubrelvy 100 mg as needed.  We will also prescribe Zofran for nausea.    We will see Letitia back in 4-5 months, sooner if symptoms warrant.     LEX Warren  [facility]   03/20/25       I spent 40 minutes caring for Letitia on this date of service. This time includes time spent by me in the following activities:preparing for the visit, reviewing tests, obtaining and/or reviewing a separately obtained history, performing a medically appropriate examination and/or evaluation , counseling and " educating the patient/family/caregiver, ordering medications, tests, or procedures, referring and communicating with other health care professionals , documenting information in the medical record, independently interpreting results and communicating that information with the patient/family/caregiver, and care coordination  Follow Up   No follow-ups on file.  Patient was given instructions and counseling regarding her condition or for health maintenance advice. Please see specific information pulled into the AVS if appropriate.       Dictated using Dragon Dictation    As of April 2021, as required by the Federal 21st Century Cures Act, medical records (including provider notes and laboratory/imaging results) are to be made available to patient’s and/or their designees as soon as the documents are signed/resulted. While the intention is to ensure transparency and to engage the patient in their healthcare, this immediate access may create unintended consequences as this document uses language intended for communication between medical experts and diagnostic results are interpreted with the entirety of the patient’s clinical picture in mind. It is recommended that patients and/or their designees review all available information with their primary or specialist providers for explanation and guidance to avoid misinterpretation based on layperson understanding, non-medical expert opinions, or Internet searches.

## 2025-03-26 ENCOUNTER — OFFICE VISIT (OUTPATIENT)
Dept: FAMILY MEDICINE CLINIC | Facility: CLINIC | Age: 30
End: 2025-03-26
Payer: COMMERCIAL

## 2025-03-26 VITALS
HEIGHT: 65 IN | BODY MASS INDEX: 20.83 KG/M2 | TEMPERATURE: 98.2 F | WEIGHT: 125 LBS | DIASTOLIC BLOOD PRESSURE: 62 MMHG | SYSTOLIC BLOOD PRESSURE: 110 MMHG

## 2025-03-26 DIAGNOSIS — K59.00 CONSTIPATION, UNSPECIFIED CONSTIPATION TYPE: ICD-10-CM

## 2025-03-26 DIAGNOSIS — M54.31 SCIATICA OF RIGHT SIDE: ICD-10-CM

## 2025-03-26 DIAGNOSIS — M70.60 GREATER TROCHANTERIC BURSITIS, UNSPECIFIED LATERALITY: Primary | ICD-10-CM

## 2025-03-26 PROCEDURE — 99214 OFFICE O/P EST MOD 30 MIN: CPT | Performed by: STUDENT IN AN ORGANIZED HEALTH CARE EDUCATION/TRAINING PROGRAM

## 2025-03-26 RX ORDER — TIZANIDINE HYDROCHLORIDE 4 MG/1
4 CAPSULE, GELATIN COATED ORAL 3 TIMES DAILY
Qty: 30 CAPSULE | Refills: 2 | Status: SHIPPED | OUTPATIENT
Start: 2025-03-26

## 2025-03-26 RX ORDER — PREDNISONE 10 MG/1
TABLET ORAL
Qty: 10 TABLET | Refills: 0 | Status: SHIPPED | OUTPATIENT
Start: 2025-03-26

## 2025-03-27 ENCOUNTER — PROCEDURE VISIT (OUTPATIENT)
Dept: OBSTETRICS AND GYNECOLOGY | Age: 30
End: 2025-03-27
Payer: COMMERCIAL

## 2025-03-27 VITALS
SYSTOLIC BLOOD PRESSURE: 120 MMHG | HEIGHT: 65 IN | DIASTOLIC BLOOD PRESSURE: 76 MMHG | BODY MASS INDEX: 20.83 KG/M2 | WEIGHT: 125 LBS

## 2025-03-27 DIAGNOSIS — R87.612 LOW GRADE SQUAMOUS INTRAEPITHELIAL LESION ON CYTOLOGIC SMEAR OF CERVIX (LGSIL): Primary | ICD-10-CM

## 2025-03-27 PROCEDURE — 81025 URINE PREGNANCY TEST: CPT | Performed by: STUDENT IN AN ORGANIZED HEALTH CARE EDUCATION/TRAINING PROGRAM

## 2025-03-27 PROCEDURE — 57454 BX/CURETT OF CERVIX W/SCOPE: CPT | Performed by: STUDENT IN AN ORGANIZED HEALTH CARE EDUCATION/TRAINING PROGRAM

## 2025-03-27 NOTE — PROGRESS NOTES
[facility]   Obstetrics and Gynecology     3/27/2025      Patient:  Lettiia Hernandez   MR#:[id number]    Office note    Chief Complaint   Patient presents with    Procedure     CC: colposcopy. Last pap 25 LSIL HPV +.        Subjective     History of Present Illness  29 y.o. female  presenting for colposcopy following L IRMA, HPV positive Pap smear.      Relevant data reviewed: IGP,CtNgTv,rfx Apt HPV All (2025 15:03)       Patient Active Problem List   Diagnosis    Body mass index (BMI) of 19 or less in adult    Vitamin D deficiency    GERD (gastroesophageal reflux disease)    Allergic rhinitis    Bloating    Epigastric pain    Bilious vomiting with nausea    Vasovagal attack    Panic attack as reaction to stress    Abdominal pain    Change in bowel habits    Migraine with aura and without status migrainosus, not intractable    Low grade squamous intraepithelial lesion on cytologic smear of cervix (LGSIL)       Past Medical History:   Diagnosis Date    Dizziness     Environmental allergies     Gastric polyp     GERD (gastroesophageal reflux disease)     Pituitary cyst     Syncope      Past Surgical History:   Procedure Laterality Date    ENDOSCOPY  02/10/2015    gastric polyp    ENDOSCOPY N/A 2023    Procedure: ESOPHAGOGASTRODUODENOSCOPY WITH BX;  Surgeon: Jitendra Parikh MD;  [place] [facility] [place] [place];  Service: Gastroenterology;  Laterality: N/A;  Normal     Obstetric History:  OB History          0    Para   0    Term   0       0    AB   0    Living   0         SAB   0    IAB   0    Ectopic   0    Molar   0    Multiple   0    Live Births   0               Menstrual History:     Patient's last menstrual period was 2025 (exact date).       The patient has never been pregnant.  Family History   Problem Relation Age of Onset    Hypertension Mother     Hypertension Father     Seizures Brother     Cancer Maternal Aunt     Cancer Maternal Grandmother     Colon  polyps Neg Hx     Crohn's disease Neg Hx     Colon cancer Neg Hx     Irritable bowel syndrome Neg Hx     Ulcerative colitis Neg Hx      Social History     Tobacco Use    Smoking status: Never     Passive exposure: Never    Smokeless tobacco: Never    Tobacco comments:     tried smoking in high school, caffeine use maybe twice weekly   Vaping Use    Vaping status: Never Used   Substance Use Topics    Alcohol use: Not Currently     Comment: socially    Drug use: Not Currently     Patient has no known allergies.    Current Outpatient Medications:     celecoxib (CeleBREX) 100 MG capsule, Take 1 capsule by mouth 2 (Two) Times a Day As Needed for Mild Pain or Moderate Pain., Disp: 60 capsule, Rfl: 0    cetirizine (zyrTEC) 10 MG tablet, Take 1 tablet by mouth Daily., Disp: , Rfl:     Cholecalciferol (Vitamin D3) 1.25 MG (20765 UT) capsule, Take 1 capsule by mouth 1 (One) Time Per Week., Disp: 12 capsule, Rfl: 0    famotidine (PEPCID) 20 MG tablet, Take 1 tablet by mouth Every Night., Disp: 30 tablet, Rfl: 2    fluticasone (FLONASE) 50 MCG/ACT nasal spray, Shake liquid and use 2 sprays in each nostril daily., Disp: 16 g, Rfl: 10    meloxicam (MOBIC) 15 MG tablet, Take 1 tablet by mouth Daily., Disp: 30 tablet, Rfl: 0    methocarbamol (ROBAXIN) 500 MG tablet, Take 1 tablet by mouth 3 (Three) Times a Day As Needed for Muscle Spasms., Disp: 30 tablet, Rfl: 1    Multiple Vitamins-Minerals (MULTIVITAMIN WITH MINERALS) tablet, Take  by mouth., Disp: , Rfl:     omeprazole (priLOSEC) 40 MG capsule, Take 1 capsule by mouth Daily., Disp: 90 capsule, Rfl: 3    ondansetron ODT (ZOFRAN-ODT) 4 MG disintegrating tablet, Place 1 tablet on the tongue Every 8 (Eight) Hours As Needed for Nausea or Vomiting., Disp: 20 tablet, Rfl: 2    TiZANidine (Zanaflex) 4 MG capsule, Take 1 capsule by mouth 3 (Three) Times a Day., Disp: 30 capsule, Rfl: 2    albuterol sulfate  (90 Base) MCG/ACT inhaler, Inhale 2 puffs 3 (Three) Times a Day. (Patient  "not taking: Reported on 3/27/2025), Disp: 6.7 g, Rfl: 0    ALPRAZolam (Xanax) 0.25 MG tablet, Take 1 tablet by mouth 1 (One) Time As Needed for Anxiety (claustrophobia) for up to 1 dose. (Patient not taking: Reported on 3/27/2025), Disp: 1 tablet, Rfl: 0    predniSONE (DELTASONE) 10 MG tablet, 4 tablets for day one at once, then 3 tablets day two, 2 tablets day three, 1 tablet.  Take with food in AM (Patient not taking: Reported on 3/27/2025), Disp: 10 tablet, Rfl: 0    ubrogepant (Ubrelvy) 100 MG tablet, Take one tab for moderate to severe headache, may repeat once after 2 hours if needed, max 200 mg in 24 hours (Patient not taking: Reported on 3/27/2025), Disp: 4 tablet, Rfl: 0    ubrogepant (Ubrelvy) 100 MG tablet, Take 1 tablet by mouth 1 (One) Time As Needed (for migraine headache). May repeat dose in two hours if needed. (Patient not taking: Reported on 3/27/2025), Disp: 10 tablet, Rfl: 3      Review of Systems   All other systems reviewed and are negative.      BP Readings from Last 3 Encounters:   03/27/25 120/76   03/26/25 110/62   03/20/25 98/64      Wt Readings from Last 3 Encounters:   03/27/25 56.7 kg (125 lb)   03/26/25 56.7 kg (125 lb)   03/20/25 59.9 kg (132 lb)      BMI: Estimated body mass index is 20.83 kg/m² as calculated from the following:    Height as of this encounter: 165 cm (64.96\").    Weight as of this encounter: 56.7 kg (125 lb). BSA: Estimated body surface area is 1.62 meters squared as calculated from the following:    Height as of this encounter: 165 cm (64.96\").    Weight as of this encounter: 56.7 kg (125 lb).    Objective   Physical Exam  Vitals and nursing note reviewed. Exam conducted with a chaperone present.   Constitutional:       Appearance: Normal appearance.   HENT:      Head: Normocephalic and atraumatic.   Pulmonary:      Effort: Pulmonary effort is normal.   Abdominal:      General: Abdomen is flat.      Palpations: Abdomen is soft.   Genitourinary:     Exam position: " Lithotomy position.      Labia:         Right: No rash or lesion.         Left: No rash or lesion.       Vagina: Normal. No vaginal discharge or lesions.      Cervix: Normal. Lesion present.      Uterus: Normal. Not tender.       Adnexa: Right adnexa normal and left adnexa normal.        Right: No mass or tenderness.          Left: No mass or tenderness.              Comments: Acetowhite changes as noted above.   Significant areas of nonstaining with Lugol's over face of cervix.   Skin:     General: Skin is warm and dry.   Neurological:      Mental Status: She is alert and oriented to person, place, and time.   Psychiatric:         Mood and Affect: Mood normal.     Colposcopy Procedure Note    Indications: Recent pap smear showed LSIL, HPV+     Procedure Details   The risks and benefits of the procedure were reviewed, and verbal informed consent was obtained.  Speculum was placed in vagina, and excellent visualization of cervix achieved.  Cervix was swabbed x 3 with acetic acid solution and then Lugol's solution.  Colposcopic exam revealed findings noted below.  Cervical biopsy was performed at 5 o'clock.  Endocervical curettage was performed. Hemostasis was achieved with combination of silver nitrate and Monsel's solution.    Findings: complete squamocolumnar junction was identified, cervix showed acetowhite area at 5 o'clock, patchy areas of nonstaning areas with Lugol's over entire face of cervix.     Specimens: endocervical curettings, cervical biopsy at 5 o'clock, spirabrush sample    Complications: none, patient tolerated the procedure well    Plan:  Specimens sent to pathology  Will base treatment plan on these results  Patient instructed to take NSAIDs for pain control  No follow up appointment needed at this time, will call patient with results    Noni More MD  03/27/25  14:21 EDT      Assessment & Plan     Diagnoses and all orders for this visit:    1. Low grade squamous intraepithelial lesion  on cytologic smear of cervix (LGSIL) (Primary)  Comments:  - colposcopy performed today without difficulty.   - RTC based on results of biopsy.  Orders:  -     POC Pregnancy, Urine  -     Reference Histopathology        No follow-ups on file.    Noni More MD   3/27/2025 14:21 EDT

## 2025-03-29 NOTE — PROGRESS NOTES
"Chief Complaint  Sciatica (Pt would like to discuss pain, pt report PT has not helped.)    Subjective        Letitia Hernandez presents to [facility]  History of Present Illness       The patient presents for evaluation of back pain and constipation.    She experienced a sudden inability to bend over while changing clothes on Sunday, accompanied by persistent sciatic pain radiating from her hip to her thigh. The pain, initially localized to the right side, has now shifted to the left. She describes the pain as a rapid pulsing sensation, akin to a bruise upon touch. She has been managing her symptoms with physical therapy and medication, including meloxicam, which provided no relief. She has been performing physical therapy exercises at home after attending sessions 3 times a week for several weeks. Methocarbamol has provided some relief, but she continues to experience difficulty in sitting and standing. She has been avoiding strenuous activities and using a heating pad, which has not been effective. She has been taking Mobic and Celebrex.    She reports a decrease in bloating since resuming daily probiotic intake. She has not found MiraLAX beneficial and has not tried magnesium supplements. She has a history of constipation dating back to childhood, which required enemas. She experiences flatulence throughout the day and notes that discontinuation of probiotics leads to constipation.        Objective   Vital Signs:  /62   Temp 98.2 °F (36.8 °C)   Ht 165.1 cm (65\")   Wt 56.7 kg (125 lb)   BMI 20.80 kg/m²   Estimated body mass index is 20.8 kg/m² as calculated from the following:    Height as of this encounter: 165.1 cm (65\").    Weight as of this encounter: 56.7 kg (125 lb).    BMI is within normal parameters. No other follow-up for BMI required.      Physical Exam  Constitutional:       General: She is not in acute distress.  Eyes:      Conjunctiva/sclera: Conjunctivae normal. "   Cardiovascular:      Rate and Rhythm: Normal rate and regular rhythm.   Pulmonary:      Effort: Pulmonary effort is normal. No respiratory distress.      Breath sounds: Normal breath sounds.   Abdominal:      General: There is no distension.      Palpations: Abdomen is soft.      Tenderness: There is no abdominal tenderness. There is no guarding.   Musculoskeletal:      Comments: No vertebral tenderness. TTP over greater trochanter.    Skin:     General: Skin is warm and dry.   Neurological:      Mental Status: She is alert and oriented to person, place, and time.      Sensory: No sensory deficit.      Motor: No weakness.   Psychiatric:         Mood and Affect: Mood normal.         Behavior: Behavior normal.        Result Review :  The following data was reviewed by: Jocelin Gordon MD on 03/26/2025:    Data reviewed : none           Assessment and Plan   Diagnoses and all orders for this visit:    1. Greater trochanteric bursitis, unspecified laterality (Primary)  -     predniSONE (DELTASONE) 10 MG tablet; 4 tablets for day one at once, then 3 tablets day two, 2 tablets day three, 1 tablet.  Take with food in AM (Patient not taking: Reported on 3/27/2025)  Dispense: 10 tablet; Refill: 0    2. Sciatica of right side  -     XR Spine Lumbar 2 or 3 View (In Office)  -     TiZANidine (Zanaflex) 4 MG capsule; Take 1 capsule by mouth 3 (Three) Times a Day.  Dispense: 30 capsule; Refill: 2    3. Constipation, unspecified constipation type           1. Greater Trochanteric Bursitis  The x-ray results do not indicate any significant abnormalities, suggesting that the pain may not be originating from the back. The possibility of bursitis was considered due to tenderness over the greater trochanteric bursa. The potential side effects of steroids, including jitteriness and sleep disturbances, were discussed. A steroid taper will be initiated to manage inflammation in the bursa and muscles. The muscle relaxant will be switched  to Zanaflex, which may cause drowsiness, so it should be taken at night. New exercises focusing on bursitis will be provided. If the current treatment plan proves ineffective, an MRI will be considered.    2. Constipation.  The patient reports experiencing constipation, which may be contributing to her back pain. Stool burden noted on lumbar XRAY. She is advised to take magnesium oxide daily to maintain regular bowel movements. If this is ineffective, she can try MiraLAX twice a day or senna once a day, being aware that senna can cause stomach cramps. If these options do not work, she can take magnesium citrate as a one-time dose.    Follow-up  The patient will follow up in 1 month.            Follow Up   No follow-ups on file.  Patient was given instructions and counseling regarding her condition or for health maintenance advice. Please see specific information pulled into the AVS if appropriate.     Patient or patient representative verbalized consent for the use of Ambient Listening during the visit with  Jocelin Gordon MD for chart documentation. 4/6/2025  13:38 EDT

## 2025-04-01 LAB
DX ICD CODE: NORMAL
DX ICD CODE: NORMAL
PATH REPORT.FINAL DX SPEC: NORMAL
PATH REPORT.GROSS SPEC: NORMAL
PATH REPORT.SITE OF ORIGIN SPEC: NORMAL
PATHOLOGIST NAME: NORMAL
PAYMENT PROCEDURE: NORMAL

## 2025-04-04 ENCOUNTER — RESULTS FOLLOW-UP (OUTPATIENT)
Dept: OBSTETRICS AND GYNECOLOGY | Age: 30
End: 2025-04-04
Payer: COMMERCIAL

## 2025-04-23 ENCOUNTER — OFFICE VISIT (OUTPATIENT)
Dept: FAMILY MEDICINE CLINIC | Facility: CLINIC | Age: 30
End: 2025-04-23
Payer: COMMERCIAL

## 2025-04-23 VITALS
SYSTOLIC BLOOD PRESSURE: 118 MMHG | OXYGEN SATURATION: 99 % | HEART RATE: 92 BPM | TEMPERATURE: 98.1 F | HEIGHT: 65 IN | DIASTOLIC BLOOD PRESSURE: 62 MMHG | BODY MASS INDEX: 20.83 KG/M2 | WEIGHT: 125 LBS

## 2025-04-23 DIAGNOSIS — Z00.00 ENCOUNTER FOR HEALTH MAINTENANCE EXAMINATION IN ADULT: Primary | ICD-10-CM

## 2025-04-23 DIAGNOSIS — R41.840 ATTENTION DEFICIT: ICD-10-CM

## 2025-04-23 DIAGNOSIS — Z13.6 SCREENING FOR ISCHEMIC HEART DISEASE: ICD-10-CM

## 2025-04-23 DIAGNOSIS — M54.50 CHRONIC LOW BACK PAIN WITHOUT SCIATICA, UNSPECIFIED BACK PAIN LATERALITY: ICD-10-CM

## 2025-04-23 DIAGNOSIS — K59.09 CHRONIC CONSTIPATION: ICD-10-CM

## 2025-04-23 DIAGNOSIS — Z13.0 SCREENING FOR DEFICIENCY ANEMIA: ICD-10-CM

## 2025-04-23 DIAGNOSIS — G89.29 CHRONIC LOW BACK PAIN WITHOUT SCIATICA, UNSPECIFIED BACK PAIN LATERALITY: ICD-10-CM

## 2025-04-23 DIAGNOSIS — F40.243 ANXIETY WITH FLYING: ICD-10-CM

## 2025-04-23 RX ORDER — ALPRAZOLAM 0.25 MG
0.25 TABLET ORAL DAILY PRN
Qty: 2 TABLET | Refills: 0 | Status: SHIPPED | OUTPATIENT
Start: 2025-04-23

## 2025-04-23 RX ORDER — ATOMOXETINE 40 MG/1
40 CAPSULE ORAL 2 TIMES DAILY
Qty: 60 CAPSULE | Refills: 3 | Status: SHIPPED | OUTPATIENT
Start: 2025-04-23

## 2025-04-23 NOTE — PROGRESS NOTES
"Chief Complaint  Annual Exam and Back Pain    Subjective        Letitia Hernandez presents to [facility]  History of Present Illness       The patient is a 29-year-old female who presents for an annual exam.    The chief complaint is severe and persistent back pain since Thanksgiving. She completed a course of physical therapy in 02/2025, attending two sessions and continuing the exercises at home due to financial constraints. A significant flare-up occurred following a stretching exercise, rendering her immobile for approximately 15 minutes, with the pain subsiding after 24 hours. Muscle relaxants provide some relief when used occasionally.  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 An extension of her medical leave from work has been requested. Steroid treatment provided temporary relief, with the pain returning to its original intensity within 24 to 48 hours post-treatment. Despite the persistence of the pain, a slight improvement in her ability to relax was noted. Positional changes intensified the pain. NSAIDs/muscle relaxants were ineffective in managing her symptoms.    She has been diagnosed with ADD by a psychiatrist and was recommended medication but is hesitant to start treatment. Currently enrolled in school, she reports difficulty concentrating and increased distractibility.     A long trip is planned, and medication to manage anxiety during the flight is sought.    Bowel movements occur anywhere between two to four times a day. Stool consistency varies between pre-stool softener consistency and what is shown on the [place] stool chart.        Objective   Vital Signs:  /62   Pulse 92   Temp 98.1 °F (36.7 °C)   Ht 165.1 cm (65\")   Wt 56.7 kg (125 lb)   SpO2 99%   BMI 20.80 kg/m²   Estimated body mass index is 20.8 kg/m² as calculated from the following:    Height as of this encounter: 165.1 cm (65\").    Weight as of this encounter: " 56.7 kg (125 lb).    BMI is within normal parameters. No other follow-up for BMI required.      Physical Exam  Constitutional:       General: She is not in acute distress.  Eyes:      Conjunctiva/sclera: Conjunctivae normal.   Cardiovascular:      Rate and Rhythm: Normal rate and regular rhythm.   Pulmonary:      Effort: Pulmonary effort is normal. No respiratory distress.   Abdominal:      Palpations: Abdomen is soft.      Tenderness: There is no abdominal tenderness.   Neurological:      Mental Status: She is alert and oriented to person, place, and time.      Motor: No weakness.      Gait: Gait normal.   Psychiatric:         Mood and Affect: Mood normal.         Behavior: Behavior normal.        Result Review :  The following data was reviewed by: Jocelin Gordon MD on 04/23/2025:    Data reviewed : none           Assessment and Plan   Diagnoses and all orders for this visit:    1. Encounter for health maintenance examination in adult (Primary)  Assessment & Plan:  Colonoscopy: Average risk, start screening at age 45 unless indicated sooner by GI  Cervical Cancer Screening: Up-to-date  Mammogram: Average risk, start screening at age 40  LDCT: never smoker  DEXA: indicated at age 65    Immunizations: UTD  Labs: ordered today  The ASCVD Risk score (Elida REYES, et al., 2019) failed to calculate for the following reasons:    The 2019 ASCVD risk score is only valid for ages 40 to 79      Patient was counseled in regards to maintaining a healthy lifestyle, rich in whole grains, fruits and vegetables. Limit high saturated fats and processed sugars. Maintain an active lifestyle to promote overall health and well being.         2. Screening for deficiency anemia  -     CBC Auto Differential    3. Screening for ischemic heart disease  -     Comprehensive Metabolic Panel  -     ORDER: Hemoglobin A1c  -     TSH  -     Lipid Panel    4. Chronic constipation  -     Ambulatory Referral to Gastroenterology    5. Anxiety with  flying  -     ALPRAZolam (Xanax) 0.25 MG tablet; Take 1 tablet by mouth Daily As Needed for Anxiety for up to 1 dose.  Dispense: 2 tablet; Refill: 0    6. Attention deficit  -     atomoxetine (Strattera) 40 MG capsule; Take 1 capsule by mouth 2 (Two) Times a Day.  Dispense: 60 capsule; Refill: 3    7. Chronic low back pain without sciatica, unspecified back pain laterality  -     Ambulatory Referral to Orthopedic Surgery           1. Back pain.  - The patient's back pain is likely due to muscle spasms or inflammation, exacerbated by her inability to rest due to her job.  - Physical therapy was attempted but discontinued due to cost; exercises are recommended with caution to avoid exacerbation.  - A referral to [facility] will be made for further evaluation and management of her back pain.  - A letter will be provided today to extend medical leave for her job.    2. Constipation.  - The patient's constipation appears to be under control with her current regimen of stool softeners and magnesium supplements.  - Bowel movements are occurring more consistently, though she feels she may not be fully emptying.  - A referral to a gastroenterologist will be made for further evaluation and management of her constipation.    3. Attention deficit disorder (ADD).  - The patient has been diagnosed with ADD and is experiencing increased distractibility, especially now that she is back in school.  - Strattera will be prescribed.  - Potential side effects, including racing heart rate, increased anxiety, and jitteriness, were discussed. If any adverse effects occur, she should discontinue the medication and inform the provider.    4. Flight Anxiety  - Xanax will be prescribed for use during her upcoming long flight to help manage anxiety.  - Two doses will be provided for the flight. Patient is aware that this is not intended to be long term prescription and no refills will be provided without further  visit/discussion.    7. Health maintenance.  - Non-fasting labs will be ordered today to update her health records.  - Cholesterol levels will be interpreted as non-fasting; mild elevations in triglycerides and blood sugar may be expected.  - Patient is up-to-date on vaccinations and other health maintenance activities            Follow Up   No follow-ups on file.  Patient was given instructions and counseling regarding her condition or for health maintenance advice. Please see specific information pulled into the AVS if appropriate.     Patient or patient representative verbalized consent for the use of Ambient Listening during the visit with  Jocelin Gordon MD for chart documentation. 4/23/2025  14:33 EDT

## 2025-04-23 NOTE — ASSESSMENT & PLAN NOTE
Colonoscopy: Average risk, start screening at age 45 unless indicated sooner by GI  Cervical Cancer Screening: Up-to-date  Mammogram: Average risk, start screening at age 40  LDCT: never smoker  DEXA: indicated at age 65    Immunizations: UTD  Labs: ordered today  The ASCVD Risk score (Elida REYES, et al., 2019) failed to calculate for the following reasons:    The 2019 ASCVD risk score is only valid for ages 40 to 79      Patient was counseled in regards to maintaining a healthy lifestyle, rich in whole grains, fruits and vegetables. Limit high saturated fats and processed sugars. Maintain an active lifestyle to promote overall health and well being.

## 2025-04-24 LAB
ALBUMIN SERPL-MCNC: 4.9 G/DL (ref 3.5–5.2)
ALBUMIN/GLOB SERPL: 2 G/DL
ALP SERPL-CCNC: 70 U/L (ref 39–117)
ALT SERPL-CCNC: 10 U/L (ref 1–33)
AST SERPL-CCNC: 17 U/L (ref 1–32)
BASOPHILS # BLD AUTO: 0.04 10*3/MM3 (ref 0–0.2)
BASOPHILS NFR BLD AUTO: 0.4 % (ref 0–1.5)
BILIRUB SERPL-MCNC: 0.4 MG/DL (ref 0–1.2)
BUN SERPL-MCNC: 11 MG/DL (ref 6–20)
BUN/CREAT SERPL: 16.9 (ref 7–25)
CALCIUM SERPL-MCNC: 9.7 MG/DL (ref 8.6–10.5)
CHLORIDE SERPL-SCNC: 102 MMOL/L (ref 98–107)
CHOLEST SERPL-MCNC: 230 MG/DL (ref 0–200)
CO2 SERPL-SCNC: 24.4 MMOL/L (ref 22–29)
CREAT SERPL-MCNC: 0.65 MG/DL (ref 0.57–1)
EGFRCR SERPLBLD CKD-EPI 2021: 123.2 ML/MIN/1.73
EOSINOPHIL # BLD AUTO: 0.03 10*3/MM3 (ref 0–0.4)
EOSINOPHIL NFR BLD AUTO: 0.3 % (ref 0.3–6.2)
ERYTHROCYTE [DISTWIDTH] IN BLOOD BY AUTOMATED COUNT: 12.6 % (ref 12.3–15.4)
GLOBULIN SER CALC-MCNC: 2.5 GM/DL
GLUCOSE SERPL-MCNC: 81 MG/DL (ref 65–99)
HBA1C MFR BLD: 5.2 % (ref 4.8–5.6)
HCT VFR BLD AUTO: 39 % (ref 34–46.6)
HDLC SERPL-MCNC: 53 MG/DL (ref 40–60)
HGB BLD-MCNC: 13.3 G/DL (ref 12–15.9)
IMM GRANULOCYTES # BLD AUTO: 0.03 10*3/MM3 (ref 0–0.05)
IMM GRANULOCYTES NFR BLD AUTO: 0.3 % (ref 0–0.5)
LDLC SERPL CALC-MCNC: 161 MG/DL (ref 0–100)
LYMPHOCYTES # BLD AUTO: 2.44 10*3/MM3 (ref 0.7–3.1)
LYMPHOCYTES NFR BLD AUTO: 24.2 % (ref 19.6–45.3)
MCH RBC QN AUTO: 31.1 PG (ref 26.6–33)
MCHC RBC AUTO-ENTMCNC: 34.1 G/DL (ref 31.5–35.7)
MCV RBC AUTO: 91.1 FL (ref 79–97)
MONOCYTES # BLD AUTO: 0.69 10*3/MM3 (ref 0.1–0.9)
MONOCYTES NFR BLD AUTO: 6.9 % (ref 5–12)
NEUTROPHILS # BLD AUTO: 6.84 10*3/MM3 (ref 1.7–7)
NEUTROPHILS NFR BLD AUTO: 67.9 % (ref 42.7–76)
NRBC BLD AUTO-RTO: 0 /100 WBC (ref 0–0.2)
PLATELET # BLD AUTO: 230 10*3/MM3 (ref 140–450)
POTASSIUM SERPL-SCNC: 4.4 MMOL/L (ref 3.5–5.2)
PROT SERPL-MCNC: 7.4 G/DL (ref 6–8.5)
RBC # BLD AUTO: 4.28 10*6/MM3 (ref 3.77–5.28)
SODIUM SERPL-SCNC: 139 MMOL/L (ref 136–145)
TRIGL SERPL-MCNC: 93 MG/DL (ref 0–150)
TSH SERPL DL<=0.005 MIU/L-ACNC: 0.82 UIU/ML (ref 0.27–4.2)
VLDLC SERPL CALC-MCNC: 16 MG/DL (ref 5–40)
WBC # BLD AUTO: 10.07 10*3/MM3 (ref 3.4–10.8)

## 2025-05-01 ENCOUNTER — TELEPHONE (OUTPATIENT)
Dept: FAMILY MEDICINE CLINIC | Facility: CLINIC | Age: 30
End: 2025-05-01

## 2025-05-01 NOTE — TELEPHONE ENCOUNTER
Caller: Letitia Hernandez    Relationship: Self    Best call back number: [phone]     What form or medical record are you requesting: EXTENSION OF WORK ACCOMODATION    Who is requesting this form or medical record from you: WORKPLACE    How would you like to receive the form or medical records (pick-up, mail, fax): MYCHART OR     Timeframe paperwork needed: ASAP    Additional notes: PATIENT STATES THAT DR GUPTA WROTE ACCOMMODATIONS FOR HER FOR ONLY ONE MONTH.     PATIENT STATES THAT SHE IS NEEDING THIS EXTENDED.     PATIENT STATES THAT HER WORKPLACE IS REQUIRING A NOTE THAT SAYS TO FOLLOW THE CURRENT ACCOMMODATIONS FOR 3 MONTHS.     PATIENT STATES THAT DR GUPTA HAD ALREADY WRITTEN FOR THIS BUT HER WORKPLACE DID NOT ACCEPT IT AND JUST NEEDS IT WRITTEN AS ABOVE.     PLEASE CALL PATIENT WITH ANY QUESTIONS OR CONCERNS.     PATIENT STATES THAT DUE TO HER WORK SCHEDULE, IT IS OKAY TO LEAVE A DETAILED MESSAGE OR SEND VIA [employer].

## 2025-05-05 DIAGNOSIS — R41.840 ATTENTION DEFICIT: ICD-10-CM

## 2025-05-05 DIAGNOSIS — F40.243 ANXIETY WITH FLYING: ICD-10-CM

## 2025-05-05 RX ORDER — ALPRAZOLAM 0.25 MG
0.25 TABLET ORAL DAILY PRN
Qty: 2 TABLET | Refills: 0 | Status: SHIPPED | OUTPATIENT
Start: 2025-05-05

## 2025-05-05 RX ORDER — ATOMOXETINE 40 MG/1
40 CAPSULE ORAL 2 TIMES DAILY
Qty: 60 CAPSULE | Refills: 3 | Status: SHIPPED | OUTPATIENT
Start: 2025-05-05

## 2025-05-05 NOTE — TELEPHONE ENCOUNTER
Rx Refill Note  Requested Prescriptions     Pending Prescriptions Disp Refills    ALPRAZolam (Xanax) 0.25 MG tablet 2 tablet 0     Sig: Take 1 tablet by mouth Daily As Needed for Anxiety for up to 1 dose.    atomoxetine (Strattera) 40 MG capsule 60 capsule 3     Sig: Take 1 capsule by mouth 2 (Two) Times a Day.      Last office visit with prescribing clinician: 4/23/2025   Last telemedicine visit with prescribing clinician: Visit date not found   Next office visit with prescribing clinician: 4/24/2026                         Would you like a call back once the refill request has been completed: [] Yes [] No    If the office needs to give you a call back, can they leave a voicemail: [] Yes [] No    Dwayne Eubanks CMA/LMR  05/05/25, 09:34 EDT

## 2025-05-05 NOTE — TELEPHONE ENCOUNTER
Caller: Letitia Hernandez    Relationship: Self    Best call back number:     [phone] (Mobile)       Requested Prescriptions:   Requested Prescriptions     Pending Prescriptions Disp Refills    ALPRAZolam (Xanax) 0.25 MG tablet 2 tablet 0     Sig: Take 1 tablet by mouth Daily As Needed for Anxiety for up to 1 dose.    atomoxetine (Strattera) 40 MG capsule 60 capsule 3     Sig: Take 1 capsule by mouth 2 (Two) Times a Day.        Pharmacy where request should be sent: [facility] #[id number] [place] [id number] [address]  AT [facility] [address] - [phone] [facility] [phone] FX     Last office visit with prescribing clinician: 4/23/2025   Last telemedicine visit with prescribing clinician: Visit date not found   Next office visit with prescribing clinician: 4/24/2026     Additional details provided by patient: PATIENT STATES THAT BY THE TIME INSURANCE APPROVED THEM THE PHARMACY NEEDED NEW ORDERS. PLEASE ADVISE. THANKS     Does the patient have less than a 3 day supply:  [x] Yes  [] No    Would you like a call back once the refill request has been completed: [] Yes [x] No    If the office needs to give you a call back, can they leave a voicemail: [] Yes [x] No    Domingo Pugh Rep   05/05/25 09:30 EDT

## 2025-05-13 ENCOUNTER — TELEPHONE (OUTPATIENT)
Dept: FAMILY MEDICINE CLINIC | Facility: CLINIC | Age: 30
End: 2025-05-13
Payer: COMMERCIAL

## 2025-05-13 NOTE — TELEPHONE ENCOUNTER
"Provider: DR. CARMELITA LAWSON    Caller: Letitia Hernandez    Relationship to Patient: Self    Pharmacy:   [facility] #[id number] [facility] [address] AT [facility] [address] - [phone]  - [phone]  [phone]     Phone Number: [phone]*    Reason for Call: PATIENT CALLING REGARDING atomoxetine (Strattera) 40 MG capsule PRESCRIPTION, AND STATES THAT THE PHARMACY ADVISED THAT PER INSURANCE THE PRESCRIPTION IS \"UNNECESSARY\", AND THE PHARMACIST ADVISED THE PATIENT THE INSURANCE RESPONSE IS HOW THE PRESCRIPTION WAS WRITTEN. THE PATIENT'S COST WILL BE $200 WITHOUT INSURANCE COVERAGE, EVEN WITH USING [facility]. PATENT REQUEST A CALLBACK TO ADVISE ON WHAT NEEDS TO BE DONE. IF NO ANSWER WHEN CALLING PATIENT BACK, PLEASE LEAVE A DETAILED MESSAGE.    "

## 2025-05-16 NOTE — TELEPHONE ENCOUNTER
This likely means that this medication is not on formulary.  She can attempt to reach out to her insurance to see if they have a preferred medication or consult with her psychiatrist who made formal diagnosis for alternative options.

## 2025-05-20 NOTE — TELEPHONE ENCOUNTER
"Called and lvm, advised pt to call back to discuss.    Relay     \"This likely means that this medication is not on formulary.  She can attempt to reach out to her insurance to see if they have a preferred medication or consult with her psychiatrist who made formal diagnosis for alternative options.\"                "

## 2025-06-02 ENCOUNTER — TELEPHONE (OUTPATIENT)
Dept: FAMILY MEDICINE CLINIC | Facility: CLINIC | Age: 30
End: 2025-06-02

## 2025-06-02 NOTE — TELEPHONE ENCOUNTER
Caller: Letitia Hernandez    Relationship: Self    Best call back number: [phone]     Which medication are you concerned about: MEDICATION FOR ADD/ADHD    Who prescribed you this medication: DR GUPTA    What are your concerns: PATIENT STATED HER PHARMACY ADVISED THAT THE WAY THIS WAS SENT BY DR GUPTA IS INCORRECT AND HER INSURANCE WILL NOT COVER THE PRESCRIPTION.    PATIENT STATED IT WOULD  DOLLARS TO  THIS MEDICATION FOR 30 DAY SUPPLY EVEN WITH [facility].    PATIENT STATED HER PREVIOUS TESTING AND RECORDS REGARDING HER ADD DIAGNOSIS ARE IN HER CHART FROM [facility] IF NEEDED.    PATIENT STATED SHE IS LEAVING [facility] ON 06- BUT WILL BE BACK ON 06-.    PATIENT IS REQUESTING TO KNOW IF THIS MAY BE SENT DIFFERENT LY SO SHE MAY  THE MEDICATION.    PLEASE CONTACT PATIENT TO ADVISE.

## 2025-06-09 NOTE — TELEPHONE ENCOUNTER
Yes Please. If denied, we will need her to come in to set up CSA/UDS and discuss switching to stimulant like adderall.

## 2025-06-20 ENCOUNTER — TELEPHONE (OUTPATIENT)
Dept: FAMILY MEDICINE CLINIC | Facility: CLINIC | Age: 30
End: 2025-06-20

## 2025-06-20 DIAGNOSIS — L98.9 SKIN LESION: Primary | ICD-10-CM

## 2025-06-20 DIAGNOSIS — R41.840 ATTENTION DEFICIT: ICD-10-CM

## 2025-06-20 RX ORDER — ATOMOXETINE 40 MG/1
40 CAPSULE ORAL 2 TIMES DAILY
Qty: 60 CAPSULE | Refills: 3 | Status: SHIPPED | OUTPATIENT
Start: 2025-06-20

## 2025-06-20 NOTE — TELEPHONE ENCOUNTER
Caller: Letitia Hernandez    Relationship: Self    Best call back number: [phone]*    What is the medical concern/diagnosis: HAVE MOLES CHECKED    What specialty or service is being requested: DERMATOLOGY    What is the provider, practice or medical service name: DR. GUPTA'S RECOMMENDATION    What is the office location[place], [place].    What is the office phone number:     Any additional details: PATIENT REQUEST DERMATOLOGY REFERRAL TO HAVE MOLES CHECK.

## 2025-06-20 NOTE — TELEPHONE ENCOUNTER
Caller: Letitia Hernandez    Relationship: Self    Best call back number: [phone]*    What medication are you requesting: atomoxetine (Strattera) 40 MG capsule     If a prescription is needed, what is your preferred pharmacy and phone number: [facility] #[id number] [place], [place] - [address] AT [facility] [address] - [phone]  - [phone]      Additional notes: PATIENT CALLING STATING THAT SHE NEVER PICKED UP THIS NEW MEDICATION FROM THE PHARMACY AND THE PHARMACY WILL NOT FILL AND TOLD PATIENT THAT THEY WILL NEED A NEW PRESCRIPTION FROM DR. GUPTA. PATIENT IS REQUESTING DR. GUPTA TO SEND A PRESCRIPTION TO THE PHARMACY FOR THE STRATTERA.

## 2025-07-21 NOTE — PROGRESS NOTES
"Chief Complaint   Patient presents with    Constipation         History of Present Illness  Patient is a 29-year-old female who presents today for follow-up.  She was last seen in the office February 2024.  She has a history of GERD and irregular bowel habits.  She was referred back for chronic constipation.    Patient presents today for follow-up with concerns about constipation.  She had been experiencing low back pain and had an abdominal x-ray performed that showed a large stool burden.  She was recommended to try MiraLAX and Senokot which she did with no significant improvement.    She is generally having around 2-4 bowel movements per day but reports stool comes out in small broken up pieces.  She generally does not feel that she empties completely.  Reports persistent abdominal bloating and at times has intermittent lower abdominal pain.  At times bloating improves after bowel movement but other times she can have a good bowel movement and the bloating does not change.    She denies any blood in the stool.    She continues on omeprazole for GERD.  She occasionally has nausea and at times vomiting present in the morning upon waking.     Result Review :       Office Visit with Lyla Hawk APRN (02/08/2024)    CT Abdomen Pelvis Without Contrast (12/28/2023 16:45)    Tissue Pathology Exam (05/02/2023 09:23)     UPPER GI ENDOSCOPY (05/02/2023 09:13)     Vital Signs:   BP 98/66   Pulse 81   Temp 97.8 °F (36.6 °C)   Ht 165.1 cm (65\")   Wt 59.1 kg (130 lb 4.8 oz)   SpO2 99%   BMI 21.68 kg/m²     Body mass index is 21.68 kg/m².     Physical Exam  Vitals reviewed.   Constitutional:       General: She is not in acute distress.     Appearance: She is well-developed.   HENT:      Head: Normocephalic and atraumatic.   Pulmonary:      Effort: Pulmonary effort is normal. No respiratory distress.   Abdominal:      General: Bowel sounds are normal.      Palpations: Abdomen is soft.      Tenderness: There is no " abdominal tenderness.   Skin:     General: Skin is dry.      Coloration: Skin is not pale.   Neurological:      Mental Status: She is alert and oriented to person, place, and time.   Psychiatric:         Thought Content: Thought content normal.           Assessment and Plan    Diagnoses and all orders for this visit:    1. Irritable bowel syndrome with constipation (Primary)    2. Bloating  -     Breath Hydrogen Test; Future    Other orders  -     linaclotide (Linzess) 145 MCG capsule capsule; Take 1 capsule by mouth Every Morning Before Breakfast.  Dispense: 30 capsule; Refill: 5         Discussion  Patient presents today for follow-up with concerns about constipation.  She has tried MiraLAX and Senokot with no improvement.  Recommended trial of Linzess.  Suspect constipation is contributing to ongoing bloating and distention.  Symptoms felt to be consistent with irritable bowel syndrome with constipation.    We also discussed possibility of small intestinal bacterial overgrowth which could be contributing to bloating.  Will proceed with hydrogen breath test to evaluate for this.    From a diet standpoint, discussed recommendation for high-fiber diet for constipation and discussed could consider low FODMAP diet to help with bloating.    Will arrange for follow-up in 2 to 3 months for reassessment.  May consider colonoscopy depending upon clinical course.          Follow Up   Return in about 2 months (around 9/22/2025).    Patient Instructions   For constipation, start Linzess daily as prescribed.    Schedule hydrogen breath test to assess for small intestinal bacterial overgrowth.     For bloating, recommend trial of low FODMAP diet.  Handout provided for review.

## 2025-07-22 ENCOUNTER — OFFICE VISIT (OUTPATIENT)
Dept: GASTROENTEROLOGY | Facility: CLINIC | Age: 30
End: 2025-07-22
Payer: COMMERCIAL

## 2025-07-22 VITALS
BODY MASS INDEX: 21.71 KG/M2 | HEART RATE: 81 BPM | HEIGHT: 65 IN | DIASTOLIC BLOOD PRESSURE: 66 MMHG | WEIGHT: 130.3 LBS | TEMPERATURE: 97.8 F | SYSTOLIC BLOOD PRESSURE: 98 MMHG | OXYGEN SATURATION: 99 %

## 2025-07-22 DIAGNOSIS — R14.0 BLOATING: ICD-10-CM

## 2025-07-22 DIAGNOSIS — K58.1 IRRITABLE BOWEL SYNDROME WITH CONSTIPATION: Primary | ICD-10-CM

## 2025-07-22 PROCEDURE — 99214 OFFICE O/P EST MOD 30 MIN: CPT | Performed by: NURSE PRACTITIONER

## 2025-07-22 NOTE — PATIENT INSTRUCTIONS
For constipation, start Linzess daily as prescribed.    Schedule hydrogen breath test to assess for small intestinal bacterial overgrowth.     For bloating, recommend trial of low FODMAP diet.  Handout provided for review.

## 2025-08-04 ENCOUNTER — TELEPHONE (OUTPATIENT)
Dept: GASTROENTEROLOGY | Facility: CLINIC | Age: 30
End: 2025-08-04
Payer: COMMERCIAL

## 2025-08-05 ENCOUNTER — TELEPHONE (OUTPATIENT)
Dept: FAMILY MEDICINE CLINIC | Facility: CLINIC | Age: 30
End: 2025-08-05
Payer: COMMERCIAL

## (undated) DEVICE — KT ORCA ORCAPOD DISP STRL

## (undated) DEVICE — SINGLE-USE BIOPSY FORCEPS: Brand: RADIAL JAW 4

## (undated) DEVICE — GOWN ,SIRUS,NONREINFORCED 3XL: Brand: MEDLINE

## (undated) DEVICE — Device

## (undated) DEVICE — BITEBLOCK OMNI BLOC

## (undated) DEVICE — VIAL FORMLN CAP 10PCT 20ML